# Patient Record
Sex: FEMALE | Race: WHITE
[De-identification: names, ages, dates, MRNs, and addresses within clinical notes are randomized per-mention and may not be internally consistent; named-entity substitution may affect disease eponyms.]

---

## 2018-08-09 ENCOUNTER — HOSPITAL ENCOUNTER (EMERGENCY)
Dept: HOSPITAL 33 - ED | Age: 58
Discharge: ANOTHER HOSPITAL | End: 2018-08-09
Payer: MEDICARE

## 2018-08-09 VITALS — SYSTOLIC BLOOD PRESSURE: 140 MMHG | DIASTOLIC BLOOD PRESSURE: 41 MMHG

## 2018-08-09 VITALS — HEART RATE: 76 BPM

## 2018-08-09 VITALS — OXYGEN SATURATION: 97 %

## 2018-08-09 DIAGNOSIS — R07.9: ICD-10-CM

## 2018-08-09 DIAGNOSIS — Z79.01: ICD-10-CM

## 2018-08-09 DIAGNOSIS — R10.9: Primary | ICD-10-CM

## 2018-08-09 DIAGNOSIS — Z79.899: ICD-10-CM

## 2018-08-09 DIAGNOSIS — I71.4: ICD-10-CM

## 2018-08-09 LAB
ALBUMIN SERPL-MCNC: 4 G/DL (ref 3.5–5)
ALP SERPL-CCNC: 109 U/L (ref 38–126)
ALT SERPL-CCNC: 20 U/L (ref 0–35)
ANION GAP SERPL CALC-SCNC: 16 MEQ/L (ref 5–15)
AST SERPL QL: 23 U/L (ref 14–36)
BILIRUB BLD-MCNC: 0.4 MG/DL (ref 0.2–1.3)
BUN SERPL-MCNC: 27 MG/DL (ref 7–17)
CALCIUM SPEC-MCNC: 9.1 MG/DL (ref 8.4–10.2)
CHLORIDE SERPL-SCNC: 110 MMOL/L (ref 98–107)
CO2 SERPL-SCNC: 22 MMOL/L (ref 22–30)
CREAT SERPL-MCNC: 1.8 MG/DL (ref 0.52–1.04)
GLUCOSE SERPL-MCNC: 100 MG/DL (ref 74–106)
GRANULOCYTES # BLD AUTO: 6.5 10*3/UL (ref 1.4–6.9)
HCT VFR BLD AUTO: 37.3 % (ref 35–47)
HGB BLD-MCNC: 11.9 GM/DL (ref 12–16)
INR PPP: 1.46 (ref 0.8–3)
LIPASE SERPL-CCNC: 177 U/L (ref 23–300)
MCH RBC QN AUTO: 29.6 PG (ref 26–32)
MCHC RBC AUTO-ENTMCNC: 31.9 G/DL (ref 32–36)
PLATELET # BLD AUTO: 121 K/MM3 (ref 150–450)
POTASSIUM SERPLBLD-SCNC: 4.5 MMOL/L (ref 3.5–5.1)
PROT SERPL-MCNC: 7.2 G/DL (ref 6.3–8.2)
RBC # BLD AUTO: 4.01 M/MM3 (ref 4.1–5.4)
SODIUM SERPL-SCNC: 144 MMOL/L (ref 137–145)
WBC # BLD AUTO: 9.4 K/MM3 (ref 4–10.5)

## 2018-08-09 PROCEDURE — 96375 TX/PRO/DX INJ NEW DRUG ADDON: CPT

## 2018-08-09 PROCEDURE — 36000 PLACE NEEDLE IN VEIN: CPT

## 2018-08-09 PROCEDURE — 85610 PROTHROMBIN TIME: CPT

## 2018-08-09 PROCEDURE — 85379 FIBRIN DEGRADATION QUANT: CPT

## 2018-08-09 PROCEDURE — 84484 ASSAY OF TROPONIN QUANT: CPT

## 2018-08-09 PROCEDURE — 80053 COMPREHEN METABOLIC PANEL: CPT

## 2018-08-09 PROCEDURE — 96376 TX/PRO/DX INJ SAME DRUG ADON: CPT

## 2018-08-09 PROCEDURE — 93005 ELECTROCARDIOGRAM TRACING: CPT

## 2018-08-09 PROCEDURE — 96374 THER/PROPH/DIAG INJ IV PUSH: CPT

## 2018-08-09 PROCEDURE — 85025 COMPLETE CBC W/AUTO DIFF WBC: CPT

## 2018-08-09 PROCEDURE — 71250 CT THORAX DX C-: CPT

## 2018-08-09 PROCEDURE — 83605 ASSAY OF LACTIC ACID: CPT

## 2018-08-09 PROCEDURE — 74176 CT ABD & PELVIS W/O CONTRAST: CPT

## 2018-08-09 PROCEDURE — 99285 EMERGENCY DEPT VISIT HI MDM: CPT

## 2018-08-09 PROCEDURE — 83690 ASSAY OF LIPASE: CPT

## 2018-08-09 PROCEDURE — 36415 COLL VENOUS BLD VENIPUNCTURE: CPT

## 2018-08-09 NOTE — XRAY
Indication: Short of breath.  Lower chest/upper abdomen pain.



Multiple contiguous axial images obtained through the chest without contrast

as ordered.



Comparison: None



Mid to lower lungs demonstrates bilateral subsegmental atelectasis/scarring.

Also mild bilateral dependent atelectasis and tiny calcified granuloma in the

right middle and left lower lobes.  No suspicious pulmonary mass, infiltrate,

or effusion.



Heart is enlarged.  Aorta is mildly arteriosclerotic without aneurysmal

dilatation.  A few bilateral hilar and subcarinal calcified nodes.  No

pathologic mediastinal lymphadenopathy.



Bony thorax intact with minimal degenerative changes throughout the spine.

T10 vertebral hemangioma and superior T11 Schmorl node.



CT abdomen reported separately.



Impression:

1.  Cardiomegaly, scattered atelectasis/scarring, and evidence for old

granulomatous disease.

2.  No acute cardiopulmonary abnormalities on this noncontrast exam.

3.  Incidental T10 vertebral hemangioma and T11 Schmorl node.



CT DI 21.10

## 2018-08-09 NOTE — ERPHSYRPT
- History of Present Illness


Time Seen by Provider: 08/09/18 11:53


Historian: patient


Patient Subjective Stated Complaint: sudden onset of upper abd pain 15 min 

prior to arrival.  hx of AAA.  states pain is like cramping.  denies any n/v at 

this time.


Triage Nursing Assessment: patient to room per w/c.  moaning and holding upper 

abd.  restless on cart.  skin w/d, color normal, resp easy.  abd soft, tender 

upper abd.


Physician History: 





The patient is a morbidly obese 58-year-old female with family complaining of 

onset of upper abdominal/chest pain prior to arrival.  She denies nausea, 

vomiting, or diarrhea.  She is having some difficulty breathing but it hurts to 

breath.  She has sa known abdominal aortic aneurysm.  She denies fever or 

chills.  Past medical history significant for lupus anticoagulant, AAA, morbid 

obesity, hypertension, and high cholesterol.


Timing/Duration: today, hour(s) (30 min), sudden, worse


Activities at Onset: sleep


Quality: sharpness, stabbing


Abdominal Pain Onset Location: epigastric


Pain Radiation: chest


Severity of Pain-Max: severe


Severity of Pain-Current: severe


Modifying Factors: Improves With: nothing


Associated Symptoms: shortness of breath, No nausea, No vomiting


Previous symptoms: no prior history


Allergies/Adverse Reactions: 








oxytetracycline [From Terramycin] Allergy (Verified 08/09/18 11:40)


 


oxytetracycline HCl [From Terramycin] Allergy (Verified 08/09/18 11:40)


 


Penicillins Allergy (Verified 08/09/18 11:40)


 





Home Medications: 








Metoprolol Succinate [Toprol Xl] 100 mg PO DAILY 01/21/15 [History]


Pramipexole Di-HCl [Mirapex] 1 mg PO HS 01/21/15 [History]


Quinapril HCl 40 mg PO DAILY 01/21/15 [History]


Rosuvastatin Calcium [Crestor] 20 mg PO HS 01/21/15 [History]


Warfarin Sodium 5 mg*** [Coumadin 5 MG***] 5 mg PO DAILY 01/21/15 [History]


Duloxetine HCl [Cymbalta] 120 mg PO DAILY 08/09/18 [History]


Temazepam 30 mg PO HSPRN PRN 08/09/18 [History]


clonazePAM [Klonopin] 1 mg PO HS 08/09/18 [History]





Hx Tetanus, Diphtheria Vaccination/Date Given: No


Hx Influenza Vaccination/Date Given: Yes


Hx Pneumococcal Vaccination/Date Given: Yes





- Review of Systems


Constitutional: No Fever, No Chills


Eyes: No Symptoms


Ears, Nose, & Throat: No Symptoms


Respiratory: Dyspnea


Cardiac: Chest Pain


Abdominal/Gastrointestinal: Abdominal Pain, No Nausea, No Vomiting, No Diarrhea


Genitourinary Symptoms: No Dysuria


Musculoskeletal: No Back Pain, No Neck Pain


Skin: No Rash


Neurological: No Dizziness, No Focal Weakness, No Sensory Changes


Psychological: No Symptoms


Endocrine: No Symptoms


Hematologic/Lymphatic: No Symptoms


Immunological/Allergic: No Symptoms


All Other Systems: Reviewed and Negative





- Past Medical History


Pertinent Past Medical History: Yes


Neurological History: TIA


ENT History: No Pertinent History


Cardiac History: Hypertension


Respiratory History: No Pertinent History


Endocrine Medical History: Diabetes Type II


Musculoskeletal History: Other


GI Medical History: No Pertinent History


 History: No Pertinent History


Psycho-Social History: Depression


Female Reproductive Disorders: No Pertinent History


Other Medical History: 2 LEAKY VALVES, LUPUS ANTICOAGULOPATHY





- Past Surgical History


Past Surgical History: Yes


Cardiac: Cardiac Catheterization, Cardiac Stent


Musculoskeletal: Orthopedic Surgery


Female Surgical History: Hysterectomy, Dilation & Curettage


Other Surgical History: CARPAL TUNNEL-RIGHT WRIST, RIGHT FOOT





- Social History


Smoking Status: Current every day smoker


How long have you smoked: yrs


Exposure to second hand smoke: No


Drug Use: none


Patient Lives Alone: Yes





- Female History


Hx Pregnant Now: No





- Nursing Vital Signs


Nursing Vital Signs: 


 Initial Vital Signs











Temperature  97.9 F   08/09/18 11:27


 


Pulse Rate  81   08/09/18 11:27


 


Respiratory Rate  18   08/09/18 11:27


 


Blood Pressure  181/88   08/09/18 11:27


 


O2 Sat by Pulse Oximetry  97   08/09/18 11:27








 Pain Scale











Pain Intensity                 8

















- Physical Exam


General Appearance: moderate distress, obese


Eye Exam: PERRL/EOMI, eyes nml inspection


Ears, Nose, Throat Exam: normal ENT inspection, pharynx normal, moist mucous 

membranes


Neck Exam: normal inspection, non-tender, supple, full range of motion


Respiratory Exam: normal breath sounds, lungs clear, No respiratory distress


Cardiovascular Exam: regular rate/rhythm, normal heart sounds


Gastrointestinal/Abdomen Exam: tenderness, No pulsatile mass


Pelvic Exam: not done


Rectal Exam: not done


Back Exam: normal inspection, normal range of motion, No CVA tenderness, No 

vertebral tenderness


Extremity Exam: normal inspection, normal range of motion, pelvis stable


Neurologic Exam: alert, oriented x 3, cooperative, normal mood/affect, nml 

cerebellar function, sensation nml, No motor deficits


Skin Exam: normal color, warm, dry


**SpO2 Interpretation**: normal


SpO2: 97


Oxygen Delivery: Room Air





- Course


EKG Interpreted by Me: RATE, Sinus Rhythm, NORMAL AXIS, NORMAL INTERVALS, 

NORMAL QRS, Other (no change compared to EKG from 1/22/15.)





- CT Exams


  ** Chest


CT Interpretation: Negative, Tele-radiologist Report (per Dr Mendoza), Other (aorta 

mildly arteriosclerotic without aneurysmal dilatation.)





  ** Abdomen/Pelvis


CT Interpretation: Tele-radiologist Report (per Dr Mendoza.), Other (moderate 

aortoiliac calcifications without aneurysmal dilatation. bilateral renal cysts)


Ordered Tests: 


 Active Orders 24 hr











 Category Date Time Status


 


 EKG-ER Only STAT Care  08/09/18 11:49 Active


 


 IV Insertion STAT Care  08/09/18 11:49 Active


 


 ABDOMEN AND PELVIS W/0 CONTRAS [CT] Stat Exams  08/09/18 11:50 Completed


 


 CHEST WITHOUT CONTRAST [CT] Stat Exams  08/09/18 11:52 Completed


 


 CBC W DIFF Stat Lab  08/09/18 12:06 Completed


 


 CMP Stat Lab  08/09/18 12:06 Completed


 


 D-DIMER QUANTITATION Stat Lab  08/09/18 12:06 Completed


 


 LIPASE Stat Lab  08/09/18 12:06 Completed


 


 Lactic Acid Stat Lab  08/09/18 12:04 Completed


 


 Manual Differential NC Stat Lab  08/09/18 12:06 Completed


 


 PT INR [PROTIME WITH INR] Stat Lab  08/09/18 13:56 Completed


 


 TROPONIN Q3H Lab  08/09/18 12:06 Completed


 


 TROPONIN Q3H Lab  08/09/18 15:00 Ordered


 


 TROPONIN Q3H Lab  08/09/18 18:00 Ordered


 


 TROPONIN Q3H Lab  08/09/18 21:00 Ordered


 


 TROPONIN Q3H Lab  08/10/18 00:00 Ordered


 


 UA W/RFX UR CULTURE Stat Lab  08/09/18 11:50 Uncollected








Medication Summary














Discontinued Medications














Generic Name Dose Route Start Last Admin





  Trade Name Freq  PRN Reason Stop Dose Admin


 


Famotidine  20 mg  08/09/18 11:49  08/09/18 11:59





  Pepcid 20 Mg Vial***  IV  08/09/18 11:50  20 mg





  STAT ONE   Administration





     





     





     





     


 


Famotidine  Confirm  08/09/18 11:56  





  Pepcid 20 Mg Vial***  Administered  08/09/18 11:57  





  Dose   





  20 mg   





  IV   





  .STK-MED ONE   





     





     





     





     


 


Morphine Sulfate  4 mg  08/09/18 11:49  08/09/18 11:59





  Morphine Sulfate 4 Mg Inj***  IV  08/09/18 11:50  4 mg





  STAT ONE   Administration





     





     





     





     


 


Morphine Sulfate  Confirm  08/09/18 11:56  





  Morphine Sulfate 4 Mg Inj***  Administered  08/09/18 11:57  





  Dose   





  4 mg   





  .ROUTE   





  .STK-MED ONE   





     





     





     





     


 


Morphine Sulfate  8 mg  08/09/18 13:53  





  Morphine Sulfate 4 Mg Inj***  IV  08/09/18 13:54  





  STAT ONE   





     





     





     





     


 


Ondansetron HCl  4 mg  08/09/18 11:49  08/09/18 12:00





  Zofran 4 Mg/2 Ml Vial**  IV  08/09/18 11:50  4 mg





  STAT ONE   Administration





     





     





     





     


 


Ondansetron HCl  Confirm  08/09/18 11:55  





  Zofran 4 Mg/2 Ml Vial**  Administered  08/09/18 11:56  





  Dose   





  4 mg   





  .ROUTE   





  .STK-MED ONE   





     





     





     





     











Lab/Rad Data: 


 Laboratory Result Diagrams





 08/09/18 12:06 





 08/09/18 12:06 





 Laboratory Results











  08/09/18 08/09/18 08/09/18 Range/Units





  13:56 12:06 12:06 


 


WBC     (4.0-10.5)  K/mm3


 


RBC     (4.1-5.4)  M/mm3


 


Hgb     (12.0-16.0)  gm/dl


 


Hct     (35-47)  %


 


MCV     ()  fl


 


MCH     (26-32)  pg


 


MCHC     (32-36)  g/dl


 


RDW     (11.5-14.0)  %


 


Plt Count     (150-450)  K/mm3


 


MPV     (6-9.5)  fl


 


Absolute Granulocytes     (1.4-6.9)  


 


PT  17.1 H    (9.95-12.35)  SECONDS


 


INR  1.46    (0.8-3.0)  


 


D-Dimer    547 H*  (215-500)  ng/mL


 


Sodium     (137-145)  mmol/L


 


Potassium     (3.5-5.1)  mmol/L


 


Chloride     ()  mmol/L


 


Carbon Dioxide     (22-30)  mmol/L


 


Anion Gap     (5-15)  MEQ/L


 


BUN     (7-17)  mg/dL


 


Creatinine     (0.52-1.04)  mg/dL


 


Estimated GFR     ML/MIN


 


Glucose     ()  mg/dL


 


Lactic Acid     (0.4-2.0)  


 


Calcium     (8.4-10.2)  mg/dL


 


Total Bilirubin     (0.2-1.3)  mg/dL


 


AST     (14-36)  U/L


 


ALT     (0-35)  U/L


 


Alkaline Phosphatase     ()  U/L


 


Troponin I   0.019   (0.000-0.034)  ng/mL


 


Serum Total Protein     (6.3-8.2)  g/dL


 


Albumin     (3.5-5.0)  g/dL


 


Lipase     ()  U/L














  08/09/18 08/09/18 08/09/18 Range/Units





  12:06 12:06 12:04 


 


WBC   9.4   (4.0-10.5)  K/mm3


 


RBC   4.01 L   (4.1-5.4)  M/mm3


 


Hgb   11.9 L   (12.0-16.0)  gm/dl


 


Hct   37.3   (35-47)  %


 


MCV   93.0   ()  fl


 


MCH   29.6   (26-32)  pg


 


MCHC   31.9 L   (32-36)  g/dl


 


RDW   15.9 H   (11.5-14.0)  %


 


Plt Count   121 L   (150-450)  K/mm3


 


MPV   10.9 H   (6-9.5)  fl


 


Absolute Granulocytes   6.50   (1.4-6.9)  


 


PT     (9.95-12.35)  SECONDS


 


INR     (0.8-3.0)  


 


D-Dimer     (215-500)  ng/mL


 


Sodium  144    (137-145)  mmol/L


 


Potassium  4.5    (3.5-5.1)  mmol/L


 


Chloride  110 H    ()  mmol/L


 


Carbon Dioxide  22    (22-30)  mmol/L


 


Anion Gap  16.0 H    (5-15)  MEQ/L


 


BUN  27 H    (7-17)  mg/dL


 


Creatinine  1.80 H    (0.52-1.04)  mg/dL


 


Estimated GFR  30.7    ML/MIN


 


Glucose  100    ()  mg/dL


 


Lactic Acid    1.0  (0.4-2.0)  


 


Calcium  9.1    (8.4-10.2)  mg/dL


 


Total Bilirubin  0.40    (0.2-1.3)  mg/dL


 


AST  23    (14-36)  U/L


 


ALT  20    (0-35)  U/L


 


Alkaline Phosphatase  109    ()  U/L


 


Troponin I     (0.000-0.034)  ng/mL


 


Serum Total Protein  7.2    (6.3-8.2)  g/dL


 


Albumin  4.0    (3.5-5.0)  g/dL


 


Lipase  177    ()  U/L














- Progress


Progress: improved


Progress Note: 





08/09/18 14:32


I discussed pt with Dr Macias and Dr Rubio


Counseled pt/family regarding: lab results, diagnosis, rad results





- Departure


Time of Disposition: 14:30


Departure Disposition: Transfer (transfer to [facility] ER per Dr Mahan and PMD Dr Rubio)


Clinical Impression: 


 Abdominal pain





Condition: Stable


Critical Care Time: No


Referrals: 


LAURA RUBIO [Primary Care Provider] -

## 2018-08-09 NOTE — XRAY
Indication: Short of breath.  Lower chest/upper abdomen pain.



Multiple contiguous axial images obtained through the abdomen and pelvis

without contrast as ordered.



Comparison: None



CT chest reported separately.



Study slightly degraded by respiration artifact.  Noncontrasted stomach and

bowel loops appear nonobstructed.  Appendix not seen.  No free fluid/air.

There are multiple bilateral renal cysts, largest right mid to lower pole

measuring 3.8 cm.  Slightly more dense left mid to lower renal cortical cysts

possibly more complex/viscus.  Previous hysterectomy.



Remaining liver, gallbladder, pancreas, spleen, adrenal glands, kidneys,

ureters, and bladder appear unremarkable for noncontrast exam.  Moderate

aortoiliac calcifications without aneurysmal dilatation.



Osseous structures intact.  Small fatty umbilical hernia.



Impression:

1.  Bilateral renal cysts.  Slightly dense left lower renal cysts possibly

complex/viscus.  Renal sonogram may yield further information if clinically

warranted.

2.  Small fatty umbilical hernia.

3.  Remaining CT abdomen/pelvis without contrast exam is negative.



CT DI 28.11

## 2018-09-21 ENCOUNTER — HOSPITAL ENCOUNTER (EMERGENCY)
Dept: HOSPITAL 33 - ED | Age: 58
LOS: 1 days | Discharge: HOME | End: 2018-09-22
Payer: MEDICARE

## 2018-09-21 DIAGNOSIS — D72.829: Primary | ICD-10-CM

## 2018-09-21 DIAGNOSIS — R53.1: ICD-10-CM

## 2018-09-21 DIAGNOSIS — D68.9: ICD-10-CM

## 2018-09-21 DIAGNOSIS — Z86.73: ICD-10-CM

## 2018-09-21 DIAGNOSIS — Z79.899: ICD-10-CM

## 2018-09-21 DIAGNOSIS — Z79.01: ICD-10-CM

## 2018-09-21 DIAGNOSIS — E87.6: ICD-10-CM

## 2018-09-21 LAB
ALBUMIN SERPL-MCNC: 4.1 G/DL (ref 3.5–5)
ALP SERPL-CCNC: 111 U/L (ref 38–126)
ALT SERPL-CCNC: 17 U/L (ref 0–35)
ANION GAP SERPL CALC-SCNC: 17.8 MEQ/L (ref 5–15)
AST SERPL QL: 16 U/L (ref 14–36)
BASOPHILS # BLD AUTO: 0.02 10*3/UL (ref 0–0.4)
BASOPHILS NFR BLD AUTO: 0.2 % (ref 0–0.4)
BILIRUB BLD-MCNC: 0.5 MG/DL (ref 0.2–1.3)
BNP SERPL-MCNC: 526 PG/ML (ref 0–900)
BUN SERPL-MCNC: 51 MG/DL (ref 7–17)
CALCIUM SPEC-MCNC: 9.2 MG/DL (ref 8.4–10.2)
CHLORIDE SERPL-SCNC: 96 MMOL/L (ref 98–107)
CO2 SERPL-SCNC: 29 MMOL/L (ref 22–30)
CREAT SERPL-MCNC: 3.08 MG/DL (ref 0.52–1.04)
EOSINOPHIL # BLD AUTO: 0.45 10*3/UL (ref 0–0.5)
GLUCOSE SERPL-MCNC: 137 MG/DL (ref 74–106)
GRANULOCYTES # BLD AUTO: 8.68 10*3/UL (ref 1.4–6.9)
HCT VFR BLD AUTO: 35.7 % (ref 35–47)
HGB BLD-MCNC: 11.6 GM/DL (ref 12–16)
INR PPP: 4.54 (ref 0.8–3)
LYMPHOCYTES # SPEC AUTO: 2.92 10*3/UL (ref 1–4.6)
MCH RBC QN AUTO: 30.4 PG (ref 26–32)
MCHC RBC AUTO-ENTMCNC: 32.5 G/DL (ref 32–36)
MONOCYTES # BLD AUTO: 0.83 10*3/UL (ref 0–1.3)
NEUTROPHILS NFR BLD AUTO: 67.3 % (ref 36–66)
PLATELET # BLD AUTO: 193 K/MM3 (ref 150–450)
POTASSIUM SERPLBLD-SCNC: 2.9 MMOL/L (ref 3.5–5.1)
PROT SERPL-MCNC: 7.2 G/DL (ref 6.3–8.2)
RBC # BLD AUTO: 3.81 M/MM3 (ref 4.1–5.4)
SODIUM SERPL-SCNC: 140 MMOL/L (ref 137–145)
WBC # BLD AUTO: 12.9 K/MM3 (ref 4–10.5)

## 2018-09-21 PROCEDURE — 99284 EMERGENCY DEPT VISIT MOD MDM: CPT

## 2018-09-21 PROCEDURE — 80053 COMPREHEN METABOLIC PANEL: CPT

## 2018-09-21 PROCEDURE — 85610 PROTHROMBIN TIME: CPT

## 2018-09-21 PROCEDURE — 36415 COLL VENOUS BLD VENIPUNCTURE: CPT

## 2018-09-21 PROCEDURE — 71045 X-RAY EXAM CHEST 1 VIEW: CPT

## 2018-09-21 PROCEDURE — 82150 ASSAY OF AMYLASE: CPT

## 2018-09-21 PROCEDURE — 85025 COMPLETE CBC W/AUTO DIFF WBC: CPT

## 2018-09-21 PROCEDURE — 83880 ASSAY OF NATRIURETIC PEPTIDE: CPT

## 2018-09-21 PROCEDURE — 36000 PLACE NEEDLE IN VEIN: CPT

## 2018-09-21 PROCEDURE — 96365 THER/PROPH/DIAG IV INF INIT: CPT

## 2018-09-21 NOTE — ERPHSYRPT
- History of Present Illness


Time Seen by Provider: 09/21/18 21:50


Source: patient, family


Exam Limitations: no limitations


Physician History: 





57 y/o white female with h/o stroke with chronic right side weakness and 

paresthesias, presents with pain under right breast for 2 days. sx worsening. 

pain in area with deep inspiration. no cough. no rash. pt still has gallbladder 

in place. 


Timing/Duration: day(s) (2), intermittent, worse


Activities at Onset: none


Severity of Dyspnea-Max: none


Severity of Dyspnea-Current: none


Possible Cause: occasional episodes (in last 2 days)


Modifying Factors: Improves With: deep breath (worsens)


Associated Symptoms: intermittent, chest pain/discomfort (right side), No fever

, No loss of appetite, No wheezing, No hemoptysis, No calf pain, No 

lightheadedness, No leg swelling


Allergies/Adverse Reactions: 








amoxicillin Allergy (Verified 09/21/18 22:04)


 


oxytetracycline [From Terramycin] Allergy (Verified 08/09/18 11:40)


 


oxytetracycline HCl [From Terramycin] Allergy (Verified 08/09/18 11:40)


 


Penicillins Allergy (Verified 08/09/18 11:40)


 





Home Medications: 








Metoprolol Succinate [Toprol Xl] 100 mg PO DAILY 01/21/15 [History]


Pramipexole Di-HCl [Mirapex] 1 mg PO HS 01/21/15 [History]


Quinapril HCl 40 mg PO DAILY 01/21/15 [History]


Rosuvastatin Calcium [Crestor] 20 mg PO HS 01/21/15 [History]


Warfarin Sodium 5 mg*** [Coumadin 5 MG***] 5 mg PO DAILY 01/21/15 [History]


Duloxetine HCl [Cymbalta] 120 mg PO DAILY 08/09/18 [History]


Temazepam 30 mg PO HSPRN PRN 08/09/18 [History]


clonazePAM [Klonopin] 1 mg PO HS 08/09/18 [History]





Hx Tetanus, Diphtheria Vaccination/Date Given: No


Hx Influenza Vaccination/Date Given: Yes


Hx Pneumococcal Vaccination/Date Given: Yes





- Review of Systems


Constitutional: No Symptoms, No Fever


Eyes: No Symptoms, No Discharge, No Eye Pain


Ears, Nose, & Throat: No Symptoms, No Ear Pain, No Ear Discharge


Respiratory: No Symptoms, No Cough, No Dyspnea, No Stridor, No Wheezing


Cardiac: No Symptoms, No Chest Pain, No Palpitations, No Syncope


Abdominal/Gastrointestinal: No Symptoms, No Abdominal Pain, No Nausea, No 

Vomiting, No Diarrhea


Genitourinary Symptoms: No Symptoms, No Dysuria, No Frequency, No Hematuria


Musculoskeletal: No Symptoms, Other (right chest wall pain)


Skin: No Symptoms


Neurological: No Symptoms


Psychological: No Symptoms


Endocrine: No Symptoms


Hematologic/Lymphatic: No Symptoms


Immunological/Allergic: No Symptoms


All Other Systems: Reviewed and Negative





- Past Medical History


Pertinent Past Medical History: Yes


Neurological History: TIA


ENT History: No Pertinent History


Cardiac History: Hypertension


Respiratory History: No Pertinent History


Endocrine Medical History: Diabetes Type II


Musculoskeletal History: Other


GI Medical History: No Pertinent History


 History: No Pertinent History


Psycho-Social History: Depression


Female Reproductive Disorders: No Pertinent History


Other Medical History: 2 LEAKY VALVES, LUPUS ANTICOAGULOPATHY





- Past Surgical History


Past Surgical History: Yes


Cardiac: Cardiac Catheterization, Cardiac Stent


Respiratory: No Pertinent History


Gastrointestinal: No Pertinent History


Genitourinary: No Pertinent History


Musculoskeletal: Orthopedic Surgery


Female Surgical History: Hysterectomy, Dilation & Curettage


Other Surgical History: CARPAL TUNNEL-RIGHT WRIST, RIGHT FOOT





- Social History


Smoking Status: Current every day smoker


How long have you smoked: yrs


Exposure to second hand smoke: No


Drug Use: none


Patient Lives Alone: Yes





- Nursing Vital Signs


Nursing Vital Signs: 


 Initial Vital Signs











Temperature  97.7 F   09/21/18 21:47


 


Pulse Rate  82   09/21/18 21:47


 


Blood Pressure  112/54   09/21/18 21:47


 


O2 Sat by Pulse Oximetry  95   09/21/18 21:47








 Pain Scale











Pain Intensity                 6

















- Physical Exam


General Appearance: no apparent distress, alert, anxiety


Eye Exam: PERRL/EOMI, No eyes nml inspection


Ears, Nose, Throat Exam: hearing grossly normal, normal ENT inspection, normal 

pharynx


Neck Exam: normal inspection, non-tender, supple, full range of motion


Respiratory Exam: normal breath sounds, chest tenderness (right chest wall), 

lungs clear, airway intact, No respiratory distress, No accessory muscle use, 

No rhonchi, No wheezing, No stridor


Cardiovascular/Chest Exam: normal heart sounds, regular rate/rhythm, murmur


Abdominal/Gastrointestinal Exam: soft, normal bowel sounds, tenderness (? right 

upper quad), No guarding, No rebound


Rectal Exam: not done


Extremity Exam: non-tender, normal range of motion, normal inspection


Neurologic Exam: alert, oriented x 3, cooperative, CNs II-XII nml as tested


Skin Exam: normal color, warm, dry


Lymphatic Exam: No adenopathy


Oxygen Delivery: Room Air





- Course


Nursing assessment & vital signs reviewed: Yes


Ordered Tests: 


 Active Orders 24 hr











 Category Date Time Status


 


 Cardiac Monitor STAT Care  09/21/18 22:12 Active


 


 IV Insertion STAT Care  09/21/18 22:08 Active


 


 CHEST 1 VIEW (PORTABLE) Stat Exams  09/21/18 22:12 Taken


 


 AMYLASE Stat Lab  09/21/18 22:27 Completed


 


 CBC W DIFF Stat Lab  09/21/18 22:27 Completed


 


 CMP Stat Lab  09/21/18 22:27 Completed


 


 NT PRO BNP Stat Lab  09/21/18 22:27 Completed


 


 PROTIME WITH INR Stat Lab  09/21/18 22:27 Completed








Medication Summary











Generic Name Dose Route Start Last Admin





  Trade Name Freq  PRN Reason Stop Dose Admin


 


Potassium Chloride  40 meq  09/22/18 23:21  





  Potassium Chl 40 Meq/30 Ml Oral Solution***  PO  09/22/18 23:22  





  STAT ONE   





     





     





     





     











Lab/Rad Data: 


 Laboratory Result Diagrams





 09/21/18 22:27 





 09/21/18 22:27 





 Laboratory Results











  09/21/18 09/21/18 09/21/18 Range/Units





  22:27 22:27 22:27 


 


WBC     (4.0-10.5)  K/mm3


 


RBC     (4.1-5.4)  M/mm3


 


Hgb     (12.0-16.0)  gm/dl


 


Hct     (35-47)  %


 


MCV     ()  fl


 


MCH     (26-32)  pg


 


MCHC     (32-36)  g/dl


 


RDW     (11.5-14.0)  %


 


Plt Count     (150-450)  K/mm3


 


MPV     (6-9.5)  fl


 


Gran %     (36.0-66.0)  %


 


Eos # (Auto)     (0-0.5)  


 


Absolute Lymphs (auto)     (1.0-4.6)  


 


Absolute Monos (auto)     (0.0-1.3)  


 


Lymphocytes %     (24.0-44.0)  %


 


Monocytes %     (0.0-12.0)  %


 


Eosinophils %     (0.00-5.0)  %


 


Basophils %     (0.0-0.4)  %


 


Absolute Granulocytes     (1.4-6.9)  


 


Basophils #     (0-0.4)  


 


PT   53.6 H   (9.95-12.35)  SECONDS


 


INR   4.54 H   (0.8-3.0)  


 


Sodium    140  (137-145)  mmol/L


 


Potassium    2.9 L*  (3.5-5.1)  mmol/L


 


Chloride    96 L  ()  mmol/L


 


Carbon Dioxide    29  (22-30)  mmol/L


 


Anion Gap    17.8 H  (5-15)  MEQ/L


 


BUN    51 H  (7-17)  mg/dL


 


Creatinine    3.08 H  (0.52-1.04)  mg/dL


 


Estimated GFR    16.5  ML/MIN


 


Glucose    137 H  ()  mg/dL


 


Calcium    9.2  (8.4-10.2)  mg/dL


 


Total Bilirubin    0.50  (0.2-1.3)  mg/dL


 


AST    16  (14-36)  U/L


 


ALT    17  (0-35)  U/L


 


Alkaline Phosphatase    111  ()  U/L


 


NT-Pro-B Natriuret Pep    526  (0-900)  pg/mL


 


Serum Total Protein    7.2  (6.3-8.2)  g/dL


 


Albumin    4.1  (3.5-5.0)  g/dL


 


Amylase  98    ()  U/L














  09/21/18 Range/Units





  22:27 


 


WBC  12.9 H  (4.0-10.5)  K/mm3


 


RBC  3.81 L  (4.1-5.4)  M/mm3


 


Hgb  11.6 L  (12.0-16.0)  gm/dl


 


Hct  35.7  (35-47)  %


 


MCV  93.7  ()  fl


 


MCH  30.4  (26-32)  pg


 


MCHC  32.5  (32-36)  g/dl


 


RDW  16.3 H  (11.5-14.0)  %


 


Plt Count  193  (150-450)  K/mm3


 


MPV  10.6 H  (6-9.5)  fl


 


Gran %  67.3 H  (36.0-66.0)  %


 


Eos # (Auto)  0.45  (0-0.5)  


 


Absolute Lymphs (auto)  2.92  (1.0-4.6)  


 


Absolute Monos (auto)  0.83  (0.0-1.3)  


 


Lymphocytes %  22.6 L  (24.0-44.0)  %


 


Monocytes %  6.4  (0.0-12.0)  %


 


Eosinophils %  3.5  (0.00-5.0)  %


 


Basophils %  0.2  (0.0-0.4)  %


 


Absolute Granulocytes  8.68 H  (1.4-6.9)  


 


Basophils #  0.02  (0-0.4)  


 


PT   (9.95-12.35)  SECONDS


 


INR   (0.8-3.0)  


 


Sodium   (137-145)  mmol/L


 


Potassium   (3.5-5.1)  mmol/L


 


Chloride   ()  mmol/L


 


Carbon Dioxide   (22-30)  mmol/L


 


Anion Gap   (5-15)  MEQ/L


 


BUN   (7-17)  mg/dL


 


Creatinine   (0.52-1.04)  mg/dL


 


Estimated GFR   ML/MIN


 


Glucose   ()  mg/dL


 


Calcium   (8.4-10.2)  mg/dL


 


Total Bilirubin   (0.2-1.3)  mg/dL


 


AST   (14-36)  U/L


 


ALT   (0-35)  U/L


 


Alkaline Phosphatase   ()  U/L


 


NT-Pro-B Natriuret Pep   (0-900)  pg/mL


 


Serum Total Protein   (6.3-8.2)  g/dL


 


Albumin   (3.5-5.0)  g/dL


 


Amylase   ()  U/L














- Progress


Progress: re-examined, unchanged


Air Movement: good


Progress Note: 





09/21/18 23:52


pt states she has had keflex in past without any issues.


Counseled pt/family regarding: lab results, diagnosis, need for follow-up, rad 

results





- Departure


Departure Disposition: Home


Clinical Impression: 


 Leukocytosis, Hypokalemia, Elevated INR





Condition: Stable


Critical Care Time: No


Referrals: 


LAURA OSORIO [Primary Care Provider] - 


Additional Instructions: 


drink plenty of fluids. take medications as prescribed. follow up with lab on 

monday 9/24/18 to recheck potassium level. follow up with primary doctor on 

monday 9/24/18 for further management


Prescriptions: 


Cefdinir 300 mg PO BID 7 Days #14 capsule

## 2018-09-22 VITALS — SYSTOLIC BLOOD PRESSURE: 138 MMHG | DIASTOLIC BLOOD PRESSURE: 67 MMHG | HEART RATE: 65 BPM | OXYGEN SATURATION: 96 %

## 2018-09-22 NOTE — XRAY
Indication: Right chest pain.  No known injury.



Comparison: November 11, 2016.



Portable chest less inflated again with minimal bibasilar subsegmental

atelectasis/scarring and a few scattered calcified granulomas.  No focal

infiltrate, consolidation, or large effusion.  Heart is not enlarged.  Bony

thorax intact again with mild osteopenia and degenerative changes.



Impression: Stable nonacute chest with chronic features.

## 2018-11-04 ENCOUNTER — HOSPITAL ENCOUNTER (EMERGENCY)
Dept: HOSPITAL 33 - ED | Age: 58
Discharge: HOME | End: 2018-11-04
Payer: MEDICARE

## 2018-11-04 VITALS — DIASTOLIC BLOOD PRESSURE: 51 MMHG | SYSTOLIC BLOOD PRESSURE: 138 MMHG | OXYGEN SATURATION: 95 %

## 2018-11-04 VITALS — HEART RATE: 56 BPM

## 2018-11-04 DIAGNOSIS — Z79.01: ICD-10-CM

## 2018-11-04 DIAGNOSIS — I38: ICD-10-CM

## 2018-11-04 DIAGNOSIS — I25.10: ICD-10-CM

## 2018-11-04 DIAGNOSIS — I10: ICD-10-CM

## 2018-11-04 DIAGNOSIS — Z86.73: ICD-10-CM

## 2018-11-04 DIAGNOSIS — E11.9: ICD-10-CM

## 2018-11-04 DIAGNOSIS — M10.9: ICD-10-CM

## 2018-11-04 DIAGNOSIS — M79.674: Primary | ICD-10-CM

## 2018-11-04 DIAGNOSIS — F32.9: ICD-10-CM

## 2018-11-04 DIAGNOSIS — Z79.899: ICD-10-CM

## 2018-11-04 LAB
ANION GAP SERPL CALC-SCNC: 11.9 MEQ/L (ref 5–15)
BASOPHILS # BLD AUTO: 0.03 10*3/UL (ref 0–0.4)
BASOPHILS NFR BLD AUTO: 0.2 % (ref 0–0.4)
BUN SERPL-MCNC: 50 MG/DL (ref 7–17)
CALCIUM SPEC-MCNC: 9.5 MG/DL (ref 8.4–10.2)
CHLORIDE SERPL-SCNC: 102 MMOL/L (ref 98–107)
CO2 SERPL-SCNC: 32 MMOL/L (ref 22–30)
CREAT SERPL-MCNC: 1.84 MG/DL (ref 0.52–1.04)
EOSINOPHIL # BLD AUTO: 0.35 10*3/UL (ref 0–0.5)
GLUCOSE SERPL-MCNC: 105 MG/DL (ref 74–106)
GRANULOCYTES # BLD AUTO: 8.82 10*3/UL (ref 1.4–6.9)
HCT VFR BLD AUTO: 36.1 % (ref 35–47)
HGB BLD-MCNC: 11.1 GM/DL (ref 12–16)
LYMPHOCYTES # SPEC AUTO: 2.56 10*3/UL (ref 1–4.6)
MCH RBC QN AUTO: 29.3 PG (ref 26–32)
MCHC RBC AUTO-ENTMCNC: 30.7 G/DL (ref 32–36)
MONOCYTES # BLD AUTO: 0.73 10*3/UL (ref 0–1.3)
NEUTROPHILS NFR BLD AUTO: 70.7 % (ref 36–66)
PLATELET # BLD AUTO: 225 K/MM3 (ref 150–450)
POTASSIUM SERPLBLD-SCNC: 3.9 MMOL/L (ref 3.5–5.1)
RBC # BLD AUTO: 3.78 M/MM3 (ref 4.1–5.4)
SODIUM SERPL-SCNC: 141 MMOL/L (ref 137–145)
WBC # BLD AUTO: 12.5 K/MM3 (ref 4–10.5)

## 2018-11-04 PROCEDURE — 96372 THER/PROPH/DIAG INJ SC/IM: CPT

## 2018-11-04 PROCEDURE — 85025 COMPLETE CBC W/AUTO DIFF WBC: CPT

## 2018-11-04 PROCEDURE — 84550 ASSAY OF BLOOD/URIC ACID: CPT

## 2018-11-04 PROCEDURE — 80048 BASIC METABOLIC PNL TOTAL CA: CPT

## 2018-11-04 PROCEDURE — 99284 EMERGENCY DEPT VISIT MOD MDM: CPT

## 2018-11-04 PROCEDURE — 73630 X-RAY EXAM OF FOOT: CPT

## 2018-11-04 PROCEDURE — 36415 COLL VENOUS BLD VENIPUNCTURE: CPT

## 2018-11-04 NOTE — ERPHSYRPT
- History of Present Illness


Time Seen by Provider: 11/04/18 19:36


Source: patient


Exam Limitations: no limitations


Patient Subjective Stated Complaint: right great toe red and swollen, rates 

pain 8/10


Triage Nursing Assessment: Pt c/o of right great toe pain, toe is red and 

swollen, denies any injury, pulses normal, vitals wnl, rates pain 8/10, denies 

any other issues at this time


Physician History: 





58-year-old white female with history of diabetes, atherosclerotic coronary 

artery disease, high blood pressure, lupus.


Patient arrives with complaint of pain in her right great toe symptoms since 

today denies injury.  Patient does state that she's had fungal infection in her 

toe before.





Past medical history includes TIA, diabetes, high blood pressure, depression, 

to leaky heart valves, lupus antibody, atherosclerotic coronary artery disease.





Past surgical history includes cardiac catheter, cardiac stent, hysterectomy, D&

C, orthopedic surgery, carpal tunnel release, right right wrist surgery








Method of Injury: unknown


Occurred: this morning


Quality: constant


Lower Extremities Pain: 1st toe: right


Modifying Factors: Improves With: nothing


Associated Symptoms: none


Allergies/Adverse Reactions: 








amoxicillin Allergy (Verified 11/04/18 18:51)


 


oxytetracycline [From Terramycin] Allergy (Verified 11/04/18 18:51)


 


oxytetracycline HCl [From Terramycin] Allergy (Verified 11/04/18 18:51)


 


Penicillins Allergy (Verified 11/04/18 18:51)


 





Home Medications: 








Metoprolol Succinate [Toprol Xl] 100 mg PO DAILY 01/21/15 [History]


Pramipexole Di-HCl [Mirapex] 1 mg PO HS 01/21/15 [History]


Quinapril HCl 40 mg PO DAILY 01/21/15 [History]


Rosuvastatin Calcium [Crestor] 20 mg PO HS 01/21/15 [History]


Warfarin Sodium 5 mg*** [Coumadin 5 MG***] 5 mg PO DAILY 01/21/15 [History]


Duloxetine HCl [Cymbalta] 120 mg PO DAILY 08/09/18 [History]


Temazepam 30 mg PO HSPRN PRN 08/09/18 [History]


clonazePAM [Klonopin] 1 mg PO HS 08/09/18 [History]


Duloxetine HCl [Cymbalta] 120 mg PO DAILY 11/04/18 [History]





Hx Tetanus, Diphtheria Vaccination/Date Given: No


Hx Influenza Vaccination/Date Given: Yes


Hx Pneumococcal Vaccination/Date Given: Yes





- Review of Systems


Constitutional: No Fever, No Chills


Eyes: No Symptoms


Ears, Nose, & Throat: No Symptoms


Respiratory: No Cough, No Dyspnea


Cardiac: No Chest Pain, No Edema, No Syncope


Abdominal/Gastrointestinal: No Abdominal Pain, No Nausea, No Vomiting, No 

Diarrhea


Genitourinary Symptoms: No Dysuria


Musculoskeletal: Other (pain and erythema right great toe)


Skin: Other ( right great toe slight erythema dorsally), No Rash


Neurological: No Dizziness, No Focal Weakness, No Sensory Changes


Psychological: No Symptoms


Endocrine: No Symptoms


All Other Systems: Reviewed and Negative





- Past Medical History


Pertinent Past Medical History: Yes


Neurological History: TIA


ENT History: No Pertinent History


Cardiac History: Hypertension


Respiratory History: No Pertinent History


Endocrine Medical History: Diabetes Type II


Musculoskeletal History: Other


GI Medical History: No Pertinent History


 History: No Pertinent History


Psycho-Social History: Depression


Female Reproductive Disorders: No Pertinent History


Other Medical History: 2 LEAKY VALVES, LUPUS ANTICOAGULOPATHY





- Past Surgical History


Past Surgical History: Yes


Cardiac: Cardiac Catheterization, Cardiac Stent


Respiratory: No Pertinent History


Gastrointestinal: No Pertinent History


Genitourinary: No Pertinent History


Musculoskeletal: Orthopedic Surgery


Female Surgical History: Hysterectomy, Dilation & Curettage


Other Surgical History: CARPAL TUNNEL-RIGHT WRIST, RIGHT FOOT





- Social History


Smoking Status: Former smoker


How long have you smoked: yrs


Exposure to second hand smoke: Yes


Drug Use: none


Patient Lives Alone: Yes





- Female History


Hx Pregnant Now: No





- Nursing Vital Signs


Nursing Vital Signs: 


 Initial Vital Signs











Temperature  98.3 F   11/04/18 18:41


 


Pulse Rate  66   11/04/18 18:41


 


Blood Pressure  138/51   11/04/18 18:41


 


O2 Sat by Pulse Oximetry  95   11/04/18 18:41








 Pain Scale











Pain Intensity                 6

















- Physical Exam


General Appearance: mild distress


Eyes, Ears, Nose, Throat Exam: moist mucous membranes


Neck Exam: non-tender, supple


Cardiovascular/Respiratory Exam: chest non-tender, normal breath sounds, 

regular rate/rhythm, no respiratory distress


Gastrointestinal/Abdominal Exam: non-tender, guarding


Hips Exam: bilateral: non-tender, normal inspection, normal range of motion, no 

evidence of injury


Legs Exam: bilateral leg: non-tender, normal inspection, normal range of motion

, no evidence of injury


Knees Exam: bilateral knee: non-tender, normal inspection, normal range of 

motion, no evidence of injury


Ankle Exam: bilateral ankle: non-tender, normal inspection, normal range of 

motion, no evidence of injury


Foot Exam: right foot: other (right great toe tender with palpation, decreased 

range of motion right great secondary to pain, mild erythema right dorsal great 

toe.), left foot: non-tender, normal inspection, normal range of motion, no 

evidence of injury


Neuro/Tendon Exam: normal sensation, normal motor functions


Mental Status Exam: alert, oriented x 3, cooperative


Skin Exam: warm, dry, other (mild erythema right dorsal great toe)


**SpO2 Interpretation**: normal (95%)


SpO2: 95


Oxygen Delivery: Room Air





- Course


Nursing assessment & vital signs reviewed: Yes





- Radiology Exams


  ** Right Foot


X-ray Interpretation: Interpreted by me, Negative, No Fracture, No Subluxation, 

Other (x ray right foot: no fracture, no0 subluxation, hardware in place)


Ordered Tests: 


 Active Orders 24 hr











 Category Date Time Status


 


 Splint STAT Care  11/04/18 20:55 Active


 


 FOOT (MINIMUM 3 VIEWS) Stat Exams  11/04/18 19:39 Taken


 


 BMP Stat Lab  11/04/18 19:59 Completed


 


 CBC W DIFF Stat Lab  11/04/18 19:59 Completed


 


 Uric Acid Stat Lab  11/04/18 19:59 Completed








Medication Summary














Discontinued Medications














Generic Name Dose Route Start Last Admin





  Trade Name Freq  PRN Reason Stop Dose Admin


 


Hydrocodone Bitart/Acetaminophen  1 tab  11/04/18 19:39  11/04/18 19:50





  Norco 5/325 Mg***  PO  11/04/18 19:40  1 tab





  STAT ONE   Administration





     





     





     





     


 


Hydrocodone Bitart/Acetaminophen  Confirm  11/04/18 19:49  





  Norco 5/325 Mg***  Administered  11/04/18 19:50  





  Dose   





  1 tab   





  .ROUTE   





  .STK-MED ONE   





     





     





     





     


 


Hydrocodone Bitart/Acetaminophen  1 tab  11/04/18 20:40  11/04/18 20:45





  Norco 5/325 Mg***  PO  11/04/18 20:41  1 tab





  STAT ONE   Administration





     





     





     





     


 


Hydrocodone Bitart/Acetaminophen  Confirm  11/04/18 20:41  





  Norco 5/325 Mg***  Administered  11/04/18 20:42  





  Dose   





  1 tab   





  .ROUTE   





  .STK-MED ONE   





     





     





     





     


 


Ceftriaxone Sodium  1,000 mg  11/04/18 20:39  





  Rocephin 1000 Mg Inj**  IM  11/04/18 20:40  





  STAT ONE   





     





     





     





     


 


Ceftriaxone Sodium  Confirm  11/04/18 20:41  





  Rocephin 1000 Mg Inj**  Administered  11/04/18 20:42  





  Dose   





  1,000 mg   





  .ROUTE   





  .STK-MED ONE   





     





     





     





     


 


Prednisone  40 mg  11/04/18 20:55  





  Deltasone 20 Mg***  PO  11/04/18 20:56  





  STAT ONE   





     





     





     





     











Lab/Rad Data: 


 Laboratory Result Diagrams





 11/04/18 19:59 





 11/04/18 19:59 





 Laboratory Results











  11/04/18 11/04/18 11/04/18 Range/Units





  19:59 19:59 19:59 


 


WBC    12.5 H  (4.0-10.5)  K/mm3


 


RBC    3.78 L  (4.1-5.4)  M/mm3


 


Hgb    11.1 L  (12.0-16.0)  gm/dl


 


Hct    36.1  (35-47)  %


 


MCV    95.5  ()  fl


 


MCH    29.3  (26-32)  pg


 


MCHC    30.7 L  (32-36)  g/dl


 


RDW    15.5 H  (11.5-14.0)  %


 


Plt Count    225  (150-450)  K/mm3


 


MPV    10.6 H  (6-9.5)  fl


 


Gran %    70.7 H  (36.0-66.0)  %


 


Eos # (Auto)    0.35  (0-0.5)  


 


Absolute Lymphs (auto)    2.56  (1.0-4.6)  


 


Absolute Monos (auto)    0.73  (0.0-1.3)  


 


Lymphocytes %    20.5 L  (24.0-44.0)  %


 


Monocytes %    5.8  (0.0-12.0)  %


 


Eosinophils %    2.8  (0.00-5.0)  %


 


Basophils %    0.2  (0.0-0.4)  %


 


Absolute Granulocytes    8.82 H  (1.4-6.9)  


 


Basophils #    0.03  (0-0.4)  


 


Sodium   141   (137-145)  mmol/L


 


Potassium   3.9   (3.5-5.1)  mmol/L


 


Chloride   102   ()  mmol/L


 


Carbon Dioxide   32 H   (22-30)  mmol/L


 


Anion Gap   11.9   (5-15)  MEQ/L


 


BUN   50 H   (7-17)  mg/dL


 


Creatinine   1.84 H   (0.52-1.04)  mg/dL


 


Estimated GFR   29.9   ML/MIN


 


Glucose   105   ()  mg/dL


 


Uric Acid  12.0 H    (2.6-6.0)  mg/dL


 


Calcium   9.5   (8.4-10.2)  mg/dL














- Progress


Progress: improved


Progress Note: 





11/04/18 20:57


58-year-old white female arrives with complaint of pain right great toe 

erythema dorsal right great toe.


No injury symptoms since today.


Patient with elevated uric acid level of 12.0 patient's white count 12.5 

hemoglobin 11.1 hematocrit 36.1 platelets 225





X-ray right foot no fractures no subluxation (my reading).





Patient with history of diabetes.  Patient felt to be high risk for infection 

despite the elevated uric acid level.


Therefore patient given Rocephin 1 g IM will write for Keflex 500 mg every 6 

hours for 7 days.


Also will place patient on prednisone tapering dose.


And Norco.


Will place postop shoe.


Patient advised follow-up with her family doctor.





Unable to give patient colchicine or nonsteroidals secondary to interaction 

with her medications.





Patient has stated allergy to amoxicillin and penicillin however she states she 

is able to take Keflex.


Has had Rocephin in the past.








- Departure


Time of Disposition: 21:00


Departure Disposition: Home


Clinical Impression: 


 Pain of right great toe, Acute gouty arthritis





Condition: Fair


Critical Care Time: No


Referrals: 


LAURA OSORIO [Primary Care Provider] - 


Additional Instructions: 


Return home.


Keflex 500 mg orally every 6 hours for 7 days.


Elevate right foot 24-48 hours cold packs to area.


Prednisone, Norco as prescribed.


Follow-up with your family doctor.


Return for acute distress or for severe symptoms.


Monitor your blood sugars carefully.





Prescriptions: 


Cephalexin Mh 500 mg** [Keflex 500 mg**] 500 mg PO Q6H #28 capsule


Hydrocodone/Acetaminophen [Norco 5-325 Tablet] 1 tab PO Q4-6HPRN PRN #12 tablet 

MDD 6 tableta


 PRN Reason: Pain

## 2018-11-05 NOTE — XRAY
Indication: Great toe pain, erythema, and swelling.  No known injury.



Comparison: None



3 nonweightbearing views of the right foot demonstrates old 5th metatarsal

fracture with intact orthopedic screw.  No other bony, articular, or soft

tissue abnormalities.

## 2019-04-12 ENCOUNTER — HOSPITAL ENCOUNTER (EMERGENCY)
Dept: HOSPITAL 33 - ED | Age: 59
Discharge: HOME | End: 2019-04-12
Payer: MEDICARE

## 2019-04-12 VITALS — SYSTOLIC BLOOD PRESSURE: 143 MMHG | HEART RATE: 76 BPM | DIASTOLIC BLOOD PRESSURE: 76 MMHG

## 2019-04-12 VITALS — OXYGEN SATURATION: 97 %

## 2019-04-12 DIAGNOSIS — E11.9: ICD-10-CM

## 2019-04-12 DIAGNOSIS — M19.90: ICD-10-CM

## 2019-04-12 DIAGNOSIS — I38: ICD-10-CM

## 2019-04-12 DIAGNOSIS — F32.9: ICD-10-CM

## 2019-04-12 DIAGNOSIS — E66.01: ICD-10-CM

## 2019-04-12 DIAGNOSIS — F41.9: ICD-10-CM

## 2019-04-12 DIAGNOSIS — M79.641: ICD-10-CM

## 2019-04-12 DIAGNOSIS — G47.30: ICD-10-CM

## 2019-04-12 DIAGNOSIS — S60.562A: Primary | ICD-10-CM

## 2019-04-12 DIAGNOSIS — I12.9: ICD-10-CM

## 2019-04-12 DIAGNOSIS — Z86.73: ICD-10-CM

## 2019-04-12 DIAGNOSIS — M10.9: ICD-10-CM
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
## 2019-04-12 DIAGNOSIS — Z79.899: ICD-10-CM

## 2019-04-12 DIAGNOSIS — Z79.01: ICD-10-CM

## 2019-04-12 DIAGNOSIS — M81.0: ICD-10-CM

## 2019-04-12 DIAGNOSIS — I50.9: ICD-10-CM

## 2019-04-12 DIAGNOSIS — I25.10: ICD-10-CM

## 2019-04-12 DIAGNOSIS — I25.2: ICD-10-CM

## 2019-04-12 DIAGNOSIS — N18.4: ICD-10-CM

## 2019-04-12 PROCEDURE — 99283 EMERGENCY DEPT VISIT LOW MDM: CPT

## 2019-04-12 PROCEDURE — 90715 TDAP VACCINE 7 YRS/> IM: CPT

## 2019-04-12 PROCEDURE — 90471 IMMUNIZATION ADMIN: CPT

## 2019-04-12 RX ADMIN — CLOSTRIDIUM TETANI TOXOID ANTIGEN (FORMALDEHYDE INACTIVATED), CORYNEBACTERIUM DIPHTHERIAE TOXOID ANTIGEN (FORMALDEHYDE INACTIVATED), BORDETELLA PERTUSSIS TOXOID ANTIGEN (GLUTARALDEHYDE INACTIVATED), BORDETELLA PERTUSSIS FILAMENTOUS HEMAGGLUTININ ANTIGEN (FORMALDEHYDE INACTIVATED), BORDETELLA PERTUSSIS PERTACTIN ANTIGEN, AND BORDETELLA PERTUSSIS FIMBRIAE 2/3 ANTIGEN ONE ML: 5; 2; 2.5; 5; 3; 5 INJECTION, SUSPENSION INTRAMUSCULAR at 18:39

## 2019-04-12 RX ADMIN — CEPHALEXIN ONE MG: 500 CAPSULE ORAL at 18:31

## 2019-04-12 NOTE — ERPHSYRPT
- History of Present Illness


Time Seen by Provider: 04/12/19 18:27


Source: patient


Exam Limitations: no limitations


Physician History: 





59-year-old white female arrives with complaint of hand pain patient states she 

had an insect bite overlying her right second metacarpal phalangeal joint 

dorsally she has been having erythema pain and itching to the area.





She has no fevers no nausea no vomiting.





Past medical history includes TIA, gout, high blood pressure, diabetes type 2, 

depression, leaky valves, lupus anticoagulant antibiotics.





Past surgical history includes cardiac catheter, cardiac stent, orthopedic 

surgery, hysterectomy, D&C, carpal tunnel right wrist, right foot surgery





Social history former smoker denies tobacco alcohol or illicit drug use


Occurred: days ago (2 days ago)


Method of Injury: other (insect bite)


Severity of Pain-Max: moderate


Severity of Pain-Current: mild


Extremities Pain Location: hand: left (erythema and pain overlying the second 

dorsal MCP joint)


Modifying Factors: Improves With: nothing


Associated Symptoms: none, No back pain, No chills, No chest discomfort, No 

chest pain, No dyspnea, No jaw pain, No nausea, No neck pain, No short of breath

, No vomiting


Allergies/Adverse Reactions: 








amoxicillin Allergy (Verified 11/04/18 18:51)


 


oxytetracycline [From Terramycin] Allergy (Verified 11/04/18 18:51)


 


oxytetracycline HCl [From Terramycin] Allergy (Verified 11/04/18 18:51)


 


Penicillins Allergy (Verified 11/04/18 18:51)


 





Home Medications: 








Metoprolol Succinate [Toprol Xl] 100 mg PO DAILY 01/21/15 [History]


Pramipexole Di-HCl [Mirapex] 1 mg PO HS 01/21/15 [History]


Quinapril HCl 40 mg PO DAILY 01/21/15 [History]


Rosuvastatin Calcium [Crestor] 20 mg PO HS 01/21/15 [History]


Warfarin Sodium 5 mg*** [Coumadin 5 MG***] 5 mg PO DAILY 01/21/15 [History]


Duloxetine HCl [Cymbalta] 120 mg PO DAILY 08/09/18 [History]


Temazepam 30 mg PO HSPRN PRN 08/09/18 [History]


clonazePAM [Klonopin] 1 mg PO HS 08/09/18 [History]


Duloxetine HCl [Cymbalta] 120 mg PO DAILY 11/04/18 [History]





Hx Tetanus, Diphtheria Vaccination/Date Given: No


Hx Influenza Vaccination/Date Given: Yes


Hx Pneumococcal Vaccination/Date Given: Yes





- Review of Systems


Constitutional: No Fever, No Chills


Eyes: No Symptoms


Ears, Nose, & Throat: No Symptoms


Respiratory: No Cough, No Dyspnea


Cardiac: No Chest Pain, No Edema, No Syncope


Abdominal/Gastrointestinal: No Abdominal Pain, No Nausea, No Vomiting, No 

Diarrhea


Genitourinary Symptoms: No Dysuria


Musculoskeletal: Joint Redness, Joint Pain, Other (erythema and pain overlying 

dorsal left second MCP joint insect bite to the area), No Arthralgias, No Back 

Pain, No Neck Pain, No Deformity, No Fall, No Injury, No Joint Swelling, No 

Myalgias


Skin: Other (insect bite left hand overlying metacarpal phalangeal joint 

dorsally), No Decubiti, No Induration, No Pruritis, No Rash, No Skin Lesions, 

No Dryness


Neurological: No Dizziness, No Focal Weakness, No Sensory Changes


Psychological: No Symptoms


Endocrine: No Symptoms


All Other Systems: Reviewed and Negative





- Past Medical History


Pertinent Past Medical History: Yes


Neurological History: TIA


ENT History: No Pertinent History


Cardiac History: Coronary Artery Disease, Hypertension, Myocardial Infarction (

MI)


Respiratory History: CHF, Sleep Apnea


Endocrine Medical History: Other


Musculoskeletal History: Arthritis


GI Medical History: No Pertinent History


 History: No Pertinent History


Psycho-Social History: Depression


Female Reproductive Disorders: No Pertinent History


Other Medical History: LUPUS ANTICOAGULANT DISORDER, MORBID OBESITY, GOUT, 

ANXIETY, DEPRESSION, VENTRICULAR ARRHYTHMIA, CKD STAGE 4, OSTEOPOROSIS, 

HYPERGLYCEMIA, FRACTURE PATELLA 2016





- Past Surgical History


Past Surgical History: Yes


Cardiac: Cardiac Catheterization, Cardiac Stent


Respiratory: No Pertinent History


Gastrointestinal: No Pertinent History


Genitourinary: No Pertinent History


Musculoskeletal: Orthopedic Surgery


Female Surgical History: Hysterectomy, Dilation & Curettage


Other Surgical History: CARPAL TUNNEL-RIGHT WRIST, RIGHT FOOT





- Social History


Smoking Status: Former smoker


How long have you smoked: yrs


Exposure to second hand smoke: Yes


Drug Use: none


Patient Lives Alone: Yes





- Physical Exam


General Appearance: alert


Eyes, Ears, Nose, Throat Exam: moist mucous membranes


Neck Exam: non-tender, supple


Cardiovascular/Respiratory Exam: chest non-tender, normal breath sounds, 

regular rate/rhythm, no respiratory distress


Abdominal Exam: non-tender, soft, no organomegaly, no hernia, No guarding, No 

tenderness


Back Exam: normal inspection, No vertebral tenderness


Shoulder Exam: normal inspection, non-tender, no evidence of injury, normal ROM

, No bone tenderness


Elbow/Forearm Exam: normal inspection, non-tender, no evidence of injury, 

normal ROM, No bone tenderness


Wrist Exam: normal inspection, non-tender, no evidence of injury, normal ROM, 

No bone tenderness


Hand Exam: normal ROM, soft tissue tenderness, No normal inspection (left hand 

mild erythema and slight edema overlying left dorsal second MCP joint), No non-

tender, No abrasions, No asymmetry, No bone tenderness, No deformity, No 

ecchymosis, No limited ROM


DTR - Upper Extremity Exam: tricep (R): 2+, tricep (L): 2+


Neuro/Tendon Exam: normal sensation, normal motor functions


Mental Status Exam: alert, oriented x 3, cooperative


Skin Exam: other (mild erythema and slight edema overlying left second MCP 

joint dorsally)


**SpO2 Interpretation**: normal (97%)





- Course


Nursing assessment & vital signs reviewed: Yes


Ordered Tests: 





Medication Summary











Generic Name Dose Route Start Last Admin





  Trade Name Freq  PRN Reason Stop Dose Admin


 


Cephalexin HCl  500 mg  04/12/19 18:26  





  Keflex 500 Mg**  PO  04/12/19 18:27  





  STAT ONE   





     





     





     





     














- Progress


Progress: improved


Progress Note: 





04/12/19 18:32


59-year-old white female with history of gout, TIA, high blood pressure, 

diabetes type 2, depression, leaky heart valves, lupus anticoagulant.





Patient states she had an insect bite to her dorsal left hand overlying the MCP 

joint #2.


She states the area is somewhat tender she has mild erythema to the area slight 

edema.





Patient states she took some Benadryl yesterday patient has a prescription 

which she filled on April 9, 2019 for hydrocodone No. 21 tablets are 5/325 

these are prescribed by her nurse practitioner.








Will go ahead and place patient on Keflex she has a listed allergy for 

penicillin however she is taking Keflex without problems in the past.


Patient will be advised to put cold packs to the area take Benadryl 50 mg every 

6 hours as needed.


Follow-up with her family doctor if symptoms are worse, no better in 24-48 

hours or persist longer than 72 hours.











- Departure


Departure Disposition: Home


Clinical Impression: 


Insect bite of left hand


Qualifiers:


 Encounter type: initial encounter Qualified Code(s): S60.562A - Insect bite (

nonvenomous) of left hand, initial encounter; W57.XXXA - Bitten or stung by 

nonvenomous insect and other nonvenomous arthropods, initial encounter





Condition: Fair


Critical Care Time: No


Referrals: 


JUAN A HAGEN [Primary Care Provider] - 


Additional Instructions: 


Return home.


Cold packs to area 24-48 hours.


Benadryl 50 mg orally every 6 hours as needed.


Keflex 500 mg orally every 6 hours 7 days.


Follow-up with your family doctor if symptoms worse, no better in 24-48 hours 

or persist longer than 72 hours.


Return for acute distress or for severe symptoms.





Prescriptions: 


Cephalexin Mh 500 mg** [Keflex 500 mg**] 500 mg PO Q6H #28

## 2019-11-01 ENCOUNTER — HOSPITAL ENCOUNTER (OUTPATIENT)
Dept: HOSPITAL 33 - MED SURG | Age: 59
Setting detail: OBSERVATION
LOS: 2 days | Discharge: HOME | End: 2019-11-03
Attending: GENERAL PRACTICE | Admitting: FAMILY MEDICINE
Payer: MEDICARE

## 2019-11-01 DIAGNOSIS — E11.9: ICD-10-CM

## 2019-11-01 DIAGNOSIS — R41.82: ICD-10-CM

## 2019-11-01 DIAGNOSIS — Z79.01: ICD-10-CM

## 2019-11-01 DIAGNOSIS — Z86.73: ICD-10-CM

## 2019-11-01 DIAGNOSIS — R42: ICD-10-CM

## 2019-11-01 DIAGNOSIS — R53.83: ICD-10-CM

## 2019-11-01 DIAGNOSIS — R51: Primary | ICD-10-CM

## 2019-11-01 DIAGNOSIS — I10: ICD-10-CM

## 2019-11-01 DIAGNOSIS — Z79.899: ICD-10-CM

## 2019-11-01 LAB
AMPHETAMINES UR QL: NEGATIVE
BARBITURATES UR QL: NEGATIVE
BENZODIAZ UR QL SCN: NEGATIVE
COCAINE UR QL SCN: NEGATIVE
FLUAV AG NPH QL IA: NEGATIVE
FLUBV AG NPH QL IA: NEGATIVE
GLUCOSE UR-MCNC: NEGATIVE MG/DL
INR PPP: 1.77 (ref 0.8–3)
METHADONE UR QL: NEGATIVE
OPIATES UR QL: NEGATIVE
PCP UR QL CFM>20 NG/ML: NEGATIVE
PROT UR STRIP-MCNC: 30 MG/DL
PROTHROMBIN TIME: 20.2 SECONDS (ref 9.95–12.35)
RBC #/AREA URNS HPF: (no result) /HPF (ref 0–2)
RSV AG SPEC QL IA: NEGATIVE
THC UR QL SCN: POSITIVE
WBC #/AREA URNS HPF: (no result) /HPF (ref 0–5)

## 2019-11-01 PROCEDURE — 70450 CT HEAD/BRAIN W/O DYE: CPT

## 2019-11-01 PROCEDURE — 81001 URINALYSIS AUTO W/SCOPE: CPT

## 2019-11-01 PROCEDURE — 71046 X-RAY EXAM CHEST 2 VIEWS: CPT

## 2019-11-01 PROCEDURE — 87631 RESP VIRUS 3-5 TARGETS: CPT

## 2019-11-01 PROCEDURE — 80307 DRUG TEST PRSMV CHEM ANLYZR: CPT

## 2019-11-01 PROCEDURE — G0378 HOSPITAL OBSERVATION PER HR: HCPCS

## 2019-11-01 PROCEDURE — 85610 PROTHROMBIN TIME: CPT

## 2019-11-01 PROCEDURE — 80053 COMPREHEN METABOLIC PANEL: CPT

## 2019-11-01 PROCEDURE — 36415 COLL VENOUS BLD VENIPUNCTURE: CPT

## 2019-11-01 PROCEDURE — 85027 COMPLETE CBC AUTOMATED: CPT

## 2019-11-01 RX ADMIN — GABAPENTIN SCH MG: 400 CAPSULE ORAL at 20:54

## 2019-11-01 RX ADMIN — SIMVASTATIN SCH MG: 20 TABLET, FILM COATED ORAL at 20:54

## 2019-11-01 RX ADMIN — CARVEDILOL SCH MG: 3.12 TABLET, FILM COATED ORAL at 20:59

## 2019-11-01 RX ADMIN — WARFARIN SODIUM SCH MG: 5 TABLET ORAL at 17:11

## 2019-11-01 RX ADMIN — TRAMADOL HYDROCHLORIDE PRN MG: 50 TABLET, FILM COATED ORAL at 20:54

## 2019-11-01 RX ADMIN — GABAPENTIN SCH MG: 400 CAPSULE ORAL at 17:11

## 2019-11-01 RX ADMIN — TRAMADOL HYDROCHLORIDE PRN MG: 50 TABLET, FILM COATED ORAL at 13:06

## 2019-11-01 RX ADMIN — PRAMIPEXOLE DIHYDROCHLORIDE SCH MG: 0.5 TABLET ORAL at 18:39

## 2019-11-02 RX ADMIN — ALLOPURINOL SCH MG: 300 TABLET ORAL at 09:53

## 2019-11-02 RX ADMIN — PRAMIPEXOLE DIHYDROCHLORIDE SCH MG: 0.5 TABLET ORAL at 09:55

## 2019-11-02 RX ADMIN — CARVEDILOL SCH MG: 3.12 TABLET, FILM COATED ORAL at 23:31

## 2019-11-02 RX ADMIN — PRAMIPEXOLE DIHYDROCHLORIDE SCH MG: 0.5 TABLET ORAL at 23:31

## 2019-11-02 RX ADMIN — POTASSIUM CHLORIDE SCH MEQ: 10 TABLET, EXTENDED RELEASE ORAL at 09:56

## 2019-11-02 RX ADMIN — GABAPENTIN SCH MG: 400 CAPSULE ORAL at 23:31

## 2019-11-02 RX ADMIN — Medication SCH UNIT: at 09:57

## 2019-11-02 RX ADMIN — BUMETANIDE SCH MG: 1 TABLET ORAL at 09:53

## 2019-11-02 RX ADMIN — SIMVASTATIN SCH MG: 20 TABLET, FILM COATED ORAL at 23:31

## 2019-11-02 RX ADMIN — ISOSORBIDE MONONITRATE SCH MG: 30 TABLET, EXTENDED RELEASE ORAL at 09:56

## 2019-11-02 RX ADMIN — TRAMADOL HYDROCHLORIDE PRN MG: 50 TABLET, FILM COATED ORAL at 15:47

## 2019-11-02 RX ADMIN — WARFARIN SODIUM SCH MG: 5 TABLET ORAL at 17:53

## 2019-11-02 RX ADMIN — GABAPENTIN SCH MG: 400 CAPSULE ORAL at 09:55

## 2019-11-02 RX ADMIN — GABAPENTIN SCH MG: 400 CAPSULE ORAL at 14:47

## 2019-11-02 RX ADMIN — CARVEDILOL SCH MG: 3.12 TABLET, FILM COATED ORAL at 09:56

## 2019-11-02 RX ADMIN — PANTOPRAZOLE SODIUM SCH MG: 40 TABLET, DELAYED RELEASE ORAL at 09:56

## 2019-11-02 RX ADMIN — TRAMADOL HYDROCHLORIDE PRN MG: 50 TABLET, FILM COATED ORAL at 09:55

## 2019-11-02 NOTE — PCM.NOTE
Date and Time: 11/02/19 0916





Subjective Assessment: 





still somewhat lethargic





- Review of Systems


Constitutional: Lethargy, No Fever, No Chills


Eyes: No Symptoms


Ears, Nose, & Throat: No Symptoms


Respiratory: No Cough, No Short Of Breath


Cardiac: No Chest Pain, No Edema, No Syncope


Abdominal/Gastrointestinal: No Abdominal Pain, No Nausea, No Vomiting, No 

Diarrhea


Genitourinary Symptoms: No Dysuria


Musculoskeletal: No Back Pain, No Neck Pain


Skin: No Rash


Neurological: No Dizziness, No Focal Weakness, No Sensory Changes


Psychological: No Symptoms


Endocrine: No Symptoms


Hematologic/Lymphatic: No Symptoms


Immunological/Allergic: No Symptoms





Objective Exam


General Appearance: no apparent distress, alert


Neurologic Exam: alert, oriented x 3, cooperative, normal mood/affect, nml 

cerebellar function, sensation nml, No motor deficits


Skin Exam: normal color, warm, dry


Eye Exam: PERRL, EOMI, eyes nml inspection


Ears, Nose, Throat Exam: normal ENT inspection, pharynx normal, moist mucous 

membranes


Neck Exam: normal inspection, non-tender, supple, full range of motion


Respiratory Exam: normal breath sounds, lungs clear, No respiratory distress


Cardiovascular Exam: regular rate/rhythm, normal heart sounds


Gastrointestinal/Abdomen Exam: soft, No tenderness, No mass


Extremity Exam: normal inspection, normal range of motion


Back Exam: normal inspection, normal range of motion, No CVA tenderness, No 

vertebral tenderness


Pelvic Exam: deferred


Rectal Exam: deferred





OBJECTIVE DATA


Vital Signs: 


 Vital Signs - 24 hr











  Temp Pulse Resp BP Pulse Ox


 


 11/02/19 07:43  98.5 F  79  18  130/58  95


 


 11/02/19 04:00  97.7 F  77  20  119/58  95


 


 11/01/19 23:54  97.5 F  81  17  104/51  92 L


 


 11/01/19 19:35  97.6 F  80  18  141/67  92 L


 


 11/01/19 16:17  97.9 F  80  20  140/76  96


 


 11/01/19 13:00  97.7 F  79  22  149/67  97


 


 11/01/19 11:01  97.7 F  79  22  149/67  97








 Pain Assessment - Last Documented











Pain Intensity                 0


 


Pain Scale Used                0-10 Pain Scale











Intake and Output: 


 Intake & Output











 10/30/19 10/31/19 11/01/19 11/02/19





 11:59 11:59 11:59 11:59


 


Intake Total    2552


 


Output Total    800


 


Balance    1752


 


Weight    83.5 kg











Lab Results: 


 Lab Results-Last 24 Hours











  11/01/19 11/01/19 11/01/19 Range/Units





  11:55 13:48 17:50 


 


PT    20.2 H  (9.95-12.35)  SECONDS


 


INR    1.77  (0.8-3.0)  


 


Urine Color   YELLOW   (YELLOW)  


 


Urine Appearance   CLEAR   (CLEAR)  


 


Urine pH   7.0   (5-6)  


 


Ur Specific Gravity   1.015   (1.005-1.025)  


 


Urine Protein   30   (Negative)  


 


Urine Ketones   NEGATIVE   (NEGATIVE)  


 


Urine Blood   NEGATIVE   (0-5)  Mauricio/ul


 


Urine Nitrite   NEGATIVE   (NEGATIVE)  


 


Urine Bilirubin   NEGATIVE   (NEGATIVE)  


 


Urine Urobilinogen   NEGATIVE   (0-1)  mg/dL


 


Ur Leukocyte Esterase   NEGATIVE   (NEGATIVE)  


 


Urine WBC (Auto)   NONE   (0-5)  /HPF


 


Urine RBC (Auto)   NONE   (0-2)  /HPF


 


U Epithel Cells (Auto)   RARE   (FEW)  /HPF


 


Urine Bacteria (Auto)   NONE   (NEGATIVE)  /HPF


 


Urine Mucus (Auto)   SLIGHT   (NEGATIVE)  /HPF


 


Urine Culture Reflexed   NO   (NO)  


 


Urine Glucose   NEGATIVE   (NEGATIVE)  mg/dL


 


Urine Opiates Level     (NEGATIVE)  


 


Ur Methadone     (NEGATIVE)  


 


Urine Barbiturates     (NEGATIVE)  


 


Ur Phencyclidine (PCP)     (NEGATIVE)  


 


Urine Amphetamine     (NEGATIVE)  


 


U Benzodiazepine Level     (NEGATIVE)  


 


Urine Cocaine     (NEGATIVE)  


 


Urine Marijuana (THC)     (NEGATIVE)  


 


Influenza Type A Ag  NEGATIVE    (NEGATIVE)  


 


Influenza Type B Ag  NEGATIVE    (NEGATIVE)  


 


RSV (PCR)  NEGATIVE    (Negative)  














  11/01/19 Range/Units





  Unknown 


 


PT   (9.95-12.35)  SECONDS


 


INR   (0.8-3.0)  


 


Urine Color   (YELLOW)  


 


Urine Appearance   (CLEAR)  


 


Urine pH   (5-6)  


 


Ur Specific Gravity   (1.005-1.025)  


 


Urine Protein   (Negative)  


 


Urine Ketones   (NEGATIVE)  


 


Urine Blood   (0-5)  Mauricio/ul


 


Urine Nitrite   (NEGATIVE)  


 


Urine Bilirubin   (NEGATIVE)  


 


Urine Urobilinogen   (0-1)  mg/dL


 


Ur Leukocyte Esterase   (NEGATIVE)  


 


Urine WBC (Auto)   (0-5)  /HPF


 


Urine RBC (Auto)   (0-2)  /HPF


 


U Epithel Cells (Auto)   (FEW)  /HPF


 


Urine Bacteria (Auto)   (NEGATIVE)  /HPF


 


Urine Mucus (Auto)   (NEGATIVE)  /HPF


 


Urine Culture Reflexed   (NO)  


 


Urine Glucose   (NEGATIVE)  mg/dL


 


Urine Opiates Level  NEGATIVE  (NEGATIVE)  


 


Ur Methadone  NEGATIVE  (NEGATIVE)  


 


Urine Barbiturates  NEGATIVE  (NEGATIVE)  


 


Ur Phencyclidine (PCP)  NEGATIVE  (NEGATIVE)  


 


Urine Amphetamine  NEGATIVE  (NEGATIVE)  


 


U Benzodiazepine Level  NEGATIVE  (NEGATIVE)  


 


Urine Cocaine  NEGATIVE  (NEGATIVE)  


 


Urine Marijuana (THC)  POSITIVE  (NEGATIVE)  


 


Influenza Type A Ag   (NEGATIVE)  


 


Influenza Type B Ag   (NEGATIVE)  


 


RSV (PCR)   (Negative)  











Radiology Exams: 


 Radiology Procedures











 Category Date Time Status


 


 HEAD WITHOUT CONTRAST [CT] Stat Exams  11/01/19 11:46 Taken











Multi-Disciplinary Progress Notes: 


 Multi-Disciplinary Progress Notes





11/01/19 14:42 Case Management Note by Sydney Golden


Pt states she could benifit from Pt/INR machine at home at d/c. She has an 

attendant at home, but does not alway have an attendant to take her to get 

blood work done. Pt julia need to resume [employer] at d/c for attendant 

care. No further services anticipated. Will follow until d/c.





Initialized on 11/01/19 14:42 - END OF NOTE

















Assessment/Plan


(1) Headache


Current Visit: Yes   Status: Acute   


Qualifiers: 


   Headache type: unspecified 


Code(s): R51 - HEADACHE   





(2) Lethargic


Current Visit: Yes   Status: Acute   Code(s): R53.83 - OTHER FATIGUE

## 2019-11-03 VITALS — HEART RATE: 79 BPM | SYSTOLIC BLOOD PRESSURE: 130 MMHG | OXYGEN SATURATION: 93 % | DIASTOLIC BLOOD PRESSURE: 58 MMHG

## 2019-11-03 LAB
ALBUMIN SERPL-MCNC: 3.8 G/DL (ref 3.5–5)
ALP SERPL-CCNC: 101 U/L (ref 38–126)
ALT SERPL-CCNC: 10 U/L (ref 0–35)
ANION GAP SERPL CALC-SCNC: 14 MEQ/L (ref 5–15)
AST SERPL QL: 16 U/L (ref 14–36)
BILIRUB BLD-MCNC: 0.6 MG/DL (ref 0.2–1.3)
BUN SERPL-MCNC: 33 MG/DL (ref 7–17)
CALCIUM SPEC-MCNC: 9 MG/DL (ref 8.4–10.2)
CHLORIDE SERPL-SCNC: 103 MMOL/L (ref 98–107)
CO2 SERPL-SCNC: 26 MMOL/L (ref 22–30)
CREAT SERPL-MCNC: 1.87 MG/DL (ref 0.52–1.04)
GLUCOSE SERPL-MCNC: 121 MG/DL (ref 74–106)
HCT VFR BLD AUTO: 35.3 % (ref 35–47)
HGB BLD-MCNC: 11.1 GM/DL (ref 12–16)
MCH RBC QN AUTO: 29.9 PG (ref 26–32)
MCHC RBC AUTO-ENTMCNC: 31.4 G/DL (ref 32–36)
PLATELET # BLD AUTO: 128 K/MM3 (ref 150–450)
POTASSIUM SERPLBLD-SCNC: 3.7 MMOL/L (ref 3.5–5.1)
PROT SERPL-MCNC: 7 G/DL (ref 6.3–8.2)
RBC # BLD AUTO: 3.71 M/MM3 (ref 4.1–5.4)
SODIUM SERPL-SCNC: 140 MMOL/L (ref 137–145)
WBC # BLD AUTO: 11 K/MM3 (ref 4–10.5)

## 2019-11-03 RX ADMIN — BUMETANIDE SCH MG: 1 TABLET ORAL at 09:40

## 2019-11-03 RX ADMIN — PANTOPRAZOLE SODIUM SCH MG: 40 TABLET, DELAYED RELEASE ORAL at 09:41

## 2019-11-03 RX ADMIN — POTASSIUM CHLORIDE SCH MEQ: 10 TABLET, EXTENDED RELEASE ORAL at 09:43

## 2019-11-03 RX ADMIN — Medication SCH UNIT: at 09:43

## 2019-11-03 RX ADMIN — ISOSORBIDE MONONITRATE SCH MG: 30 TABLET, EXTENDED RELEASE ORAL at 09:42

## 2019-11-03 RX ADMIN — CARVEDILOL SCH MG: 3.12 TABLET, FILM COATED ORAL at 09:43

## 2019-11-03 RX ADMIN — ALLOPURINOL SCH MG: 300 TABLET ORAL at 09:40

## 2019-11-03 RX ADMIN — PRAMIPEXOLE DIHYDROCHLORIDE SCH MG: 0.5 TABLET ORAL at 09:42

## 2019-11-03 RX ADMIN — GABAPENTIN SCH MG: 400 CAPSULE ORAL at 09:41

## 2019-11-03 NOTE — PCM.NOTE
Date and Time: 11/03/19 0908





Subjective Assessment: 





doing ok





- Review of Systems


Constitutional: No Fever, No Chills


Eyes: No Symptoms


Ears, Nose, & Throat: No Symptoms


Respiratory: No Cough, No Short Of Breath


Cardiac: No Chest Pain, No Edema, No Syncope


Abdominal/Gastrointestinal: No Abdominal Pain, No Nausea, No Vomiting, No 

Diarrhea


Genitourinary Symptoms: No Dysuria


Musculoskeletal: No Back Pain, No Neck Pain


Skin: No Rash


Neurological: No Dizziness, No Focal Weakness, No Sensory Changes


Psychological: No Symptoms


Endocrine: No Symptoms


Hematologic/Lymphatic: No Symptoms


Immunological/Allergic: No Symptoms





Objective Exam


General Appearance: no apparent distress, alert


Neurologic Exam: alert, oriented x 3, cooperative, normal mood/affect, nml 

cerebellar function, sensation nml, No motor deficits


Skin Exam: normal color, warm, dry


Eye Exam: PERRL, EOMI, eyes nml inspection


Ears, Nose, Throat Exam: normal ENT inspection, pharynx normal, moist mucous 

membranes


Neck Exam: normal inspection, non-tender, supple, full range of motion


Respiratory Exam: normal breath sounds, lungs clear, No respiratory distress


Cardiovascular Exam: regular rate/rhythm, normal heart sounds


Gastrointestinal/Abdomen Exam: soft, No tenderness, No mass


Extremity Exam: normal inspection, normal range of motion


Back Exam: normal inspection, normal range of motion, No CVA tenderness, No 

vertebral tenderness


Pelvic Exam: deferred


Rectal Exam: deferred





OBJECTIVE DATA


Vital Signs: 


 Vital Signs - 24 hr











  Temp Pulse Resp BP Pulse Ox


 


 11/03/19 07:00  98.2 F  84  18  170/70  95


 


 11/03/19 03:00  98.5 F  82  18  135/63  94 L


 


 11/02/19 23:49  98.8 F  99 H  20  123/59  94 L


 


 11/02/19 20:00  97.9 F  92 H  18  109/52  95


 


 11/02/19 16:00  98.4 F  85  18  133/59  92 L


 


 11/02/19 11:53  98.4 F  94 H  18  113/57  94 L








 Pain Assessment - Last Documented











Pain Intensity                 0


 


Pain Scale Used                0-10 Pain Scale











Intake and Output: 


 Intake & Output











 10/31/19 11/01/19 11/02/19 11/03/19





 11:59 11:59 11:59 10:59


 


Intake Total   3132 3947


 


Output Total   800 3400


 


Balance   2332 547


 


Weight   83.5 kg 











Lab Results: 


 Lab Results-Last 24 Hours











  11/03/19 11/03/19 Range/Units





  06:07 06:07 


 


WBC  11.0 H   (4.0-10.5)  K/mm3


 


RBC  3.71 L   (4.1-5.4)  M/mm3


 


Hgb  11.1 L   (12.0-16.0)  gm/dl


 


Hct  35.3   (35-47)  %


 


MCV  95.1   ()  fl


 


MCH  29.9   (26-32)  pg


 


MCHC  31.4 L   (32-36)  g/dl


 


RDW  15.5 H   (11.5-14.0)  %


 


Plt Count  128 L   (150-450)  K/mm3


 


MPV  11.0 H   (6-9.5)  fl


 


Sodium   140  (137-145)  mmol/L


 


Potassium   3.7  (3.5-5.1)  mmol/L


 


Chloride   103  ()  mmol/L


 


Carbon Dioxide   26  (22-30)  mmol/L


 


Anion Gap   14.0  (5-15)  MEQ/L


 


BUN   33 H  (7-17)  mg/dL


 


Creatinine   1.87 H  (0.52-1.04)  mg/dL


 


Estimated GFR   29.3  ML/MIN


 


Glucose   121 H  ()  mg/dL


 


Calcium   9.0  (8.4-10.2)  mg/dL


 


Total Bilirubin   0.60  (0.2-1.3)  mg/dL


 


AST   16  (14-36)  U/L


 


ALT   10  (0-35)  U/L


 


Alkaline Phosphatase   101  ()  U/L


 


Serum Total Protein   7.0  (6.3-8.2)  g/dL


 


Albumin   3.8  (3.5-5.0)  g/dL











Radiology Exams: 


 Radiology Procedures











 Category Date Time Status


 


 CHEST 2 VIEWS (PA AND LAT) Routine Exams  11/03/19 07:30 Taken


 


 HEAD WITHOUT CONTRAST [CT] Stat Exams  11/01/19 11:46 Taken














Assessment/Plan


(1) Headache


Current Visit: Yes   Status: Acute   


Qualifiers: 


   Headache type: unspecified   Headache chronicity pattern: chronic headache   

Intractability: not intractable   Qualified Code(s): R51 - Headache   


Assessment & Plan: 


improved


Code(s): R51 - HEADACHE   





(2) Lethargic


Current Visit: Yes   Status: Acute   


Assessment & Plan: 


resolved


Code(s): R53.83 - OTHER FATIGUE

## 2019-11-03 NOTE — PCM.DS
Discharge Summary


Date of Admission: 


11/01/19 10:40





Admitting Physician: 


MARLEEN RIVAS MD





Primary Care Provider: 


JUAN A HAGEN








Allergies


Allergies





amoxicillin Allergy (Verified 11/04/18 18:51)


 


oxytetracycline [From Terramycin] Allergy (Verified 11/04/18 18:51)


 


oxytetracycline HCl [From Terramycin] Allergy (Verified 11/04/18 18:51)


 


Penicillins Allergy (Verified 11/04/18 18:51)


 











Hospital Summary





- Hospital Course


Hospital Course: 








 Last Vital Signs











Temp  97.9 F   11/03/19 11:00


 


Pulse  79   11/03/19 11:00


 


Resp  18   11/03/19 11:00


 


BP  130/58   11/03/19 11:00


 


Pulse Ox  93 L  11/03/19 11:00








Allergies





amoxicillin Allergy (Verified 11/04/18 18:51)


 


oxytetracycline [From Terramycin] Allergy (Verified 11/04/18 18:51)


 


oxytetracycline HCl [From Terramycin] Allergy (Verified 11/04/18 18:51)


 


Penicillins Allergy (Verified 11/04/18 18:51)


 





Active Medications





Allopurinol (Zyloprim 300 Mg***)  150 mg PO DAILY LUDY


   Stop: 12/02/19 09:59


   Last Admin: 11/03/19 09:40 Dose:  150 mg


Bumetanide (Bumex 1 Mg***)  2 mg PO DAILY LUDY


   Stop: 12/02/19 09:59


   Last Admin: 11/03/19 09:40 Dose:  2 mg


Carvedilol (Coreg 3.125 Mg***)  3.125 mg PO BID LUDY


   Stop: 12/01/19 21:59


   Last Admin: 11/03/19 09:43 Dose:  3.125 mg


Cholecalciferol (Vitamin D***)  1,000 unit PO DAILY LUDY


   Stop: 12/02/19 09:59


   Last Admin: 11/03/19 09:43 Dose:  1,000 unit


Gabapentin (Neurontin 400 Mg***)  400 mg PO TID LUDY


   Stop: 12/01/19 16:29


   Last Admin: 11/03/19 09:41 Dose:  400 mg


Sodium Chloride (Sodium Chloride 0.9% 1000 Ml)  1,000 mls @ 75 mls/hr IV 

[id number] LUDY


   Stop: 12/01/19 12:29


   Last Admin: 11/03/19 03:26 Dose:  75 mls/hr


Isosorbide Mononitrate (Imdur 30 Mg***)  30 mg PO DAILY LUDY


   Stop: 12/02/19 09:59


   Last Admin: 11/03/19 09:42 Dose:  30 mg


Metolazone (Zaroxolyn 2.5 Mg**)  2.5 mg PO QOD LUDY


   Stop: 12/02/19 09:59


   Last Admin: 11/02/19 09:56 Dose:  2.5 mg


Miscellaneous Information (Medication Intervention)  1 each PO .RN TO CHECK ON 

LUDY


   Stop: 12/01/19 16:59


Nitroglycerin (Nitrostat 0.4 Mg Tablet***)  0.4 mg SL Q5MIN PRN MR X 3 PRN


   PRN Reason: angina


   Stop: 12/01/19 16:29


Ondansetron HCl (Zofran 4 Mg/2 Ml Vial**)  4 mg IV Q6H PRN PRN


   PRN Reason: NAUSEA/VOMITING


   Stop: 12/01/19 12:26


   Last Admin: 11/01/19 18:39 Dose:  4 mg


Pantoprazole Sodium (Protonix 40mg Tablet***)  40 mg PO QAM LUDY


   Stop: 12/02/19 09:59


   Last Admin: 11/03/19 09:41 Dose:  40 mg


Potassium Chloride (Klor Con 10 Meq***)  10 meq PO DAILY LUDY


   Stop: 12/02/19 09:59


   Last Admin: 11/03/19 09:43 Dose:  10 meq


Pramipexole Dihydrochloride (Mirapex 0.5 Mg Tablet***)  1 mg PO BID LUDY


   Stop: 12/01/19 21:59


   Last Admin: 11/03/19 09:42 Dose:  1 mg


Simvastatin (Zocor 20mg**)  40 mg PO HS Pending sale to [facility]


   Stop: 12/01/19 21:59


   Last Admin: 11/02/19 23:31 Dose:  40 mg


Tramadol HCl (Ultram 50 Mg***)  50 mg PO Q6H PRN PRN


   PRN Reason: PAIN


   Stop: 12/01/19 12:26


   Last Admin: 11/02/19 15:47 Dose:  50 mg


Warfarin Sodium (Coumadin 5 Mg***)  5 mg PO COU LUDY


   Stop: 12/01/19 17:59


   Last Admin: 11/02/19 17:53 Dose:  5 mg





 Intake & Output











 11/03/19 11/04/19





 11:59 11:59


 


Intake Total  


 


Output Total  


 


Balance  








 Orders





11/03/19 07:30


CHEST 2 VIEWS (PA AND LAT) Routine 








 Lab Tests











  11/03/19 11/03/19





  06:07 06:07


 


WBC  11.0 H 


 


RBC  3.71 L 


 


Hgb  11.1 L 


 


Hct  35.3 


 


MCV  95.1 


 


MCH  29.9 


 


MCHC  31.4 L 


 


RDW  15.5 H 


 


Plt Count  128 L 


 


MPV  11.0 H 


 


Sodium   140


 


Potassium   3.7


 


Chloride   103


 


Carbon Dioxide   26


 


Anion Gap   14.0


 


BUN   33 H


 


Creatinine   1.87 H


 


Estimated GFR   29.3


 


Glucose   121 H


 


Calcium   9.0


 


Total Bilirubin   0.60


 


AST   16


 


ALT   10


 


Alkaline Phosphatase   101


 


Serum Total Protein   7.0


 


Albumin   3.8











 Chief Complaint





Diagnosis                        weakness; headache; decreased mental status





 Allergies











Allergy/AdvReac Type Severity Reaction Status Date / Time


 


amoxicillin Allergy   Verified 11/04/18 18:51


 


oxytetracycline Allergy   Verified 11/04/18 18:51





[From Terramycin]     


 


oxytetracycline HCl Allergy   Verified 11/04/18 18:51





[From Terramycin]     


 


Penicillins Allergy   Verified 11/04/18 18:51








 Vital Signs (Last 24 hours)











  Temp Pulse Resp BP Pulse Ox


 


 11/03/19 11:00  97.9 F  79  18  130/58  93 L


 


 11/03/19 07:00  98.2 F  84  18  170/70  95


 


 11/03/19 03:00  98.5 F  82  18  135/63  94 L


 


 11/02/19 23:49  98.8 F  99 H  20  123/59  94 L


 


 11/02/19 20:00  97.9 F  92 H  18  109/52  95


 


 11/02/19 16:00  98.4 F  85  18  133/59  92 L








 Home Medications











 Medication  Instructions  Recorded  Confirmed  Last Taken  Type


 


Allopurinol 100 mg*** [Zyloprim 150 mg PO DAILY 11/01/19 11/01/19 Unknown 

History





100 mg***]     


 


Bumetanide 2 mg PO DAILY 11/01/19 11/01/19 Unknown History


 


Calcitriol 0.25 mcg PO DAILY 11/01/19 11/01/19 Unknown History


 


Carvedilol 3.125 mg*** [Coreg 3.125 mg PO BID 11/01/19 11/01/19 Unknown History





3.125 MG***]     


 


Cholecalciferol (Vitamin D3) 1,000 unit PO DAILY 11/01/19 11/01/19 Unknown 

History





[Vitamin D3]     


 


Gabapentin 400 mg PO TID 11/01/19 11/01/19 Unknown History


 


Isosorbide Mononitrate [Isosorbide 30 mg PO DAILY 11/01/19 11/01/19 Unknown 

History





Mononitrate ER]     


 


Nitroglycerin 0.4 mg Tablet*** 0.4 mg SL Q5MIN PRN MR X 3 PRN 11/01/19 11/01/19 

Unknown History





[Nitrostat 0.4 MG Tablet***]     


 


PANTOPRAZOLE 40 mg Tablet*** 40 mg PO QAM 11/01/19 11/01/19 Unknown History





[Protonix 40MG Tablet***]     


 


Potassium Chloride 10 Meq Tab* 10 meq PO DAILY 11/01/19 11/01/19 Unknown History





[Klor Con 10 MEQ***]     


 


Pramipexole Di-HCl [Mirapex] 1 mg PO BID 11/01/19 11/01/19 10/31/19 History


 


Rosuvastatin Calcium 20 mg PO HS 11/01/19 11/01/19 Unknown History


 


Warfarin Sodium 5 mg*** [Coumadin 5 mg PO DAILY 11/01/19 11/01/19 Unknown 

History





5 MG***]     


 


metOLazone [Metolazone] 2.5 mg PO UD 11/01/19 11/01/19 Unknown History








 Current Medications











Generic Name Dose Route Start Last Admin





  Trade Name Wilfridq  PRN Reason Stop Dose Admin


 


Allopurinol  150 mg  11/02/19 10:00  11/03/19 09:40





  Zyloprim 300 Mg***  PO  12/02/19 09:59  150 mg





  DAILY LUDY   Administration





     





     





     





     


 


Bumetanide  2 mg  11/02/19 10:00  11/03/19 09:40





  Bumex 1 Mg***  PO  12/02/19 09:59  2 mg





  DAILY LUDY   Administration





     





     





     





     


 


Carvedilol  3.125 mg  11/01/19 22:00  11/03/19 09:43





  Coreg 3.125 Mg***  PO  12/01/19 21:59  3.125 mg





  BID LUDY   Administration





     





     





     





     


 


Cholecalciferol  1,000 unit  11/02/19 10:00  11/03/19 09:43





  Vitamin D***  PO  12/02/19 09:59  1,000 unit





  DAILY LUDY   Administration





     





     





     





     


 


Gabapentin  400 mg  11/01/19 16:30  11/03/19 09:41





  Neurontin 400 Mg***  PO  12/01/19 16:29  400 mg





  TID LUDY   Administration





     





     





     





     


 


Sodium Chloride  1,000 mls @ 75 mls/hr  11/01/19 12:30  11/03/19 03:26





  Sodium Chloride 0.9% 1000 Ml  IV  12/01/19 12:29  75 mls/hr





  [id number] LUDY   Administration





     





     





     





     


 


Isosorbide Mononitrate  30 mg  11/02/19 10:00  11/03/19 09:42





  Imdur 30 Mg***  PO  12/02/19 09:59  30 mg





  DAILY LUDY   Administration





     





     





     





     


 


Metolazone  2.5 mg  11/02/19 10:00  11/02/19 09:56





  Zaroxolyn 2.5 Mg**  PO  12/02/19 09:59  2.5 mg





  QOD LUDY   Administration





     





     





     





     


 


Miscellaneous Information  1 each  11/01/19 17:00  





  Medication Intervention  PO  12/01/19 16:59  





  .RN TO CHECK ON LUDY   





     





     





     





     


 


Nitroglycerin  0.4 mg  11/01/19 16:30  





  Nitrostat 0.4 Mg Tablet***  SL  12/01/19 16:29  





  Q5MIN PRN MR X 3 PRN   





  angina   





     





     





     


 


Ondansetron HCl  4 mg  11/01/19 12:27  11/01/19 18:39





  Zofran 4 Mg/2 Ml Vial**  IV  12/01/19 12:26  4 mg





  Q6H PRN PRN   Administration





  NAUSEA/VOMITING   





     





     





     


 


Pantoprazole Sodium  40 mg  11/02/19 10:00  11/03/19 09:41





  Protonix 40mg Tablet***  PO  12/02/19 09:59  40 mg





  QAM LUDY   Administration





     





     





     





     


 


Potassium Chloride  10 meq  11/02/19 10:00  11/03/19 09:43





  Klor Con 10 Meq***  PO  12/02/19 09:59  10 meq





  DAILY LUDY   Administration





     





     





     





     


 


Pramipexole Dihydrochloride  1 mg  11/01/19 22:00  11/03/19 09:42





  Mirapex 0.5 Mg Tablet***  PO  12/01/19 21:59  1 mg





  BID LUDY   Administration





     





     





     





     


 


Simvastatin  40 mg  11/01/19 22:00  11/02/19 23:31





  Zocor 20mg**  PO  12/01/19 21:59  40 mg





  HS LUDY   Administration





     





     





     





     


 


Tramadol HCl  50 mg  11/01/19 12:27  11/02/19 15:47





  Ultram 50 Mg***  PO  12/01/19 12:26  50 mg





  Q6H PRN PRN   Administration





  PAIN   





     





     





     


 


Warfarin Sodium  5 mg  11/01/19 18:00  11/02/19 17:53





  Coumadin 5 Mg***  PO  12/01/19 17:59  5 mg





  COU LUDY   Administration





     





     





     





     














Discontinued Medications














Generic Name Dose Route Start Last Admin





  Trade Name Freq  PRN Reason Stop Dose Admin


 


Pramipexole Dihydrochloride  Confirm  11/01/19 18:37  





  Mirapex 0.5 Mg Tablet***  Administered  11/01/19 18:38  





  Dose   





  1 mg   





  .ROUTE   





  .STK-MED ONE   





     





     





     





     








 Intake & Output (Last 24 hours)











 11/01/19 11/02/19 11/03/19 11/04/19





 12:59 12:59 11:59 11:59


 


Intake Total    


 


Output Total    


 


Balance    


 


Weight    








 Laboratory Results (Last 24 hours)











  11/03/19 11/03/19





  06:07 06:07


 


WBC   11.0 H


 


RBC   3.71 L


 


Hgb   11.1 L


 


Hct   35.3


 


MCV   95.1


 


MCH   29.9


 


MCHC   31.4 L


 


RDW   15.5 H


 


Plt Count   128 L


 


MPV   11.0 H


 


Sodium  140 


 


Potassium  3.7 


 


Chloride  103 


 


Carbon Dioxide  26 


 


Anion Gap  14.0 


 


BUN  33 H 


 


Creatinine  1.87 H 


 


Estimated GFR  29.3 


 


Glucose  121 H 


 


Calcium  9.0 


 


Total Bilirubin  0.60 


 


AST  16 


 


ALT  10 


 


Alkaline Phosphatase  101 


 


Serum Total Protein  7.0 


 


Albumin  3.8 








 Orders (Last 24 hours)











 Category Date Time Status


 


 CHEST 2 VIEWS (PA AND LAT) Routine Exams  11/03/19 07:30 Taken


 


 CBC AM.LAB Lab  11/03/19 06:07 Completed


 


 CMP AM.LAB Lab  11/03/19 06:07 Completed














- Vitals & Intake/Output


Vital Signs: 





 Vital Signs











Temperature  97.9 F   11/03/19 11:00


 


Pulse Rate  79   11/03/19 11:00


 


Respiratory Rate  18   11/03/19 11:00


 


Blood Pressure  130/58   11/03/19 11:00


 


O2 Sat by Pulse Oximetry  93 L  11/03/19 11:00








 Oxygen-Last Documented











O2 Percentage                  2 Liters = 28%














Intake & Output: 





 Intake & Output











 11/01/19 11/02/19 11/03/19 11/04/19





 12:59 12:59 11:59 11:59


 


Intake Total    


 


Output Total    


 


Balance    


 


Weight    














- Lab


Result Diagrams: 


 11/03/19 06:07





 11/03/19 06:07


Lab Results-Last 24 Hrs: 





 Lab Results-Last 24 Hours











  11/03/19 11/03/19 Range/Units





  06:07 06:07 


 


WBC  11.0 H   (4.0-10.5)  K/mm3


 


RBC  3.71 L   (4.1-5.4)  M/mm3


 


Hgb  11.1 L   (12.0-16.0)  gm/dl


 


Hct  35.3   (35-47)  %


 


MCV  95.1   ()  fl


 


MCH  29.9   (26-32)  pg


 


MCHC  31.4 L   (32-36)  g/dl


 


RDW  15.5 H   (11.5-14.0)  %


 


Plt Count  128 L   (150-450)  K/mm3


 


MPV  11.0 H   (6-9.5)  fl


 


Sodium   140  (137-145)  mmol/L


 


Potassium   3.7  (3.5-5.1)  mmol/L


 


Chloride   103  ()  mmol/L


 


Carbon Dioxide   26  (22-30)  mmol/L


 


Anion Gap   14.0  (5-15)  MEQ/L


 


BUN   33 H  (7-17)  mg/dL


 


Creatinine   1.87 H  (0.52-1.04)  mg/dL


 


Estimated GFR   29.3  ML/MIN


 


Glucose   121 H  ()  mg/dL


 


Calcium   9.0  (8.4-10.2)  mg/dL


 


Total Bilirubin   0.60  (0.2-1.3)  mg/dL


 


AST   16  (14-36)  U/L


 


ALT   10  (0-35)  U/L


 


Alkaline Phosphatase   101  ()  U/L


 


Serum Total Protein   7.0  (6.3-8.2)  g/dL


 


Albumin   3.8  (3.5-5.0)  g/dL














- Radiology Exams


Ordered Rad Exams-Entire Visit: 





 Radiology Procedures











 Category Date Time Status


 


 CHEST 2 VIEWS (PA AND LAT) Routine Exams  11/03/19 07:30 Taken














- Procedures and Test


Procedures and Tests throughout Hospitalization: 





 Therapy Orders & Screens





11/01/19 14:14


Smoking Cessation Education ONCE 


   Comment: 


   Diagnosis: weakness; headache; decreased mental status


   Smoking Status: Light tobacco smoker


   How long have you smoked: yrs


   Have you smoked in the past 12 months: Yes


   Approximately how many cigarettes per day: pt unsure


   Do you dip or chew tobacco: No


   If,Former Smoker,when did you quit: 1.5 years














Discharge Exam


General Appearance: no apparent distress, alert


Neurologic Exam: alert, oriented x 3, cooperative, normal mood/affect, nml 

cerebellar function, sensation nml, No motor deficits


Eye Exam: PERRL, EOMI, eyes nml inspection


Ears, Nose, Throat Exam: normal ENT inspection, pharynx normal, moist mucous 

membranes


Neck Exam: normal inspection, non-tender, supple, full range of motion


Respiratory Exam: normal breath sounds, lungs clear, No respiratory distress


Cardiovascular Exam: regular rate/rhythm, normal heart sounds


Gastrointestinal/Abdomen Exam: soft, No tenderness, No mass


Pelvic Exam: deferred


Rectal Exam: deferred


Back Exam: normal inspection, normal range of motion, No CVA tenderness, No 

vertebral tenderness


Extremity Exam: normal inspection, normal range of motion


Skin Exam: normal color, warm, dry





Final Diagnosis/Problem List





- Final Discharge Diagnosis/Problem


(1) Headache


Current Visit: Yes   Status: Resolved   Code(s): R51 - HEADACHE   





(2) Lethargic


Current Visit: Yes   Status: Resolved   Code(s): R53.83 - OTHER FATIGUE   





- Discharge


Discharge Date: 11/03/19


Disposition: Home, Self-Care


Condition: Stable


Prescriptions: 


Continue


   Bumetanide 2 mg PO DAILY


   Allopurinol 100 mg*** [Zyloprim 100 mg***] 150 mg PO DAILY


   Calcitriol 0.25 mcg PO DAILY


   Warfarin Sodium 5 mg*** [Coumadin 5 MG***] 5 mg PO DAILY


   Cholecalciferol (Vitamin D3) [Vitamin D3] 1,000 unit PO DAILY


   Isosorbide Mononitrate [Isosorbide Mononitrate ER] 30 mg PO DAILY


   Rosuvastatin Calcium 20 mg PO HS


   Pramipexole Di-HCl [Mirapex] 1 mg PO BID


   Potassium Chloride 10 Meq Tab* [Klor Con 10 MEQ***] 10 meq PO DAILY


   PANTOPRAZOLE 40 mg Tablet*** [Protonix 40MG Tablet***] 40 mg PO QAM


   metOLazone [Metolazone] 2.5 mg PO UD


   Gabapentin 400 mg PO TID


   Carvedilol 3.125 mg*** [Coreg 3.125 MG***] 3.125 mg PO BID


   Nitroglycerin 0.4 mg Tablet*** [Nitrostat 0.4 MG Tablet***] 0.4 mg SL Q5MIN 

PRN MR X 3 PRN


     PRN Reason: angina


Follow up with: 


JUAN A HAGEN [Primary Care Provider] - 1 Week

## 2019-11-03 NOTE — XRAY
Indication: Cough.



Comparison: September 21, 2018.



PA/lateral chest demonstrates new patchy left upper lobe infiltrate.  Stable

bilateral mid to lower lung subsegmental atelectasis/scarring and a few

incidental calcified granulomas.  Heart is not enlarged.  Bony thorax intact

again with mild osteopenia and degenerative changes.



Impression: New left upper lobe infiltrate.  Stable atelectasis/scarring and

evidence for old granulomatous disease.



Comment: Preliminary interpretation was made by [facility].  No discrepancy.

## 2019-11-04 NOTE — XRAY
Indication: Severe headache.  History TIA.



Multiple contiguous axial images obtained through the head without contrast.



Comparison: None



Age-appropriate global atrophy, moderate periventricular degenerative

micro-ischemia bilaterally, and high right parietal old infarct with

underlying gliosis.  No acute intracranial hemorrhage, hydrocephalus, or mass

effect.  Fourth ventricle is midline.  Bony calvarium is intact.  There is

chronic appearing mild mucoperiosteal thickening of both ethmoid and right

sphenoid sinuses.  Previous bilateral maxillary sinus antrectomy surgery.

Mastoid air cells are clear.



Impression:

1.  Normal aging brain including atrophy and degenerative micro-ischemia.  Old

right parietal infarct.

2.  No acute intracranial abnormalities.

3.  Incidental chronic paranasal sinus disease.



CTDI is 62.81

## 2020-01-02 ENCOUNTER — HOSPITAL ENCOUNTER (OUTPATIENT)
Dept: HOSPITAL 33 - ED | Age: 60
Setting detail: OBSERVATION
LOS: 1 days | Discharge: HOME | End: 2020-01-03
Attending: FAMILY MEDICINE | Admitting: FAMILY MEDICINE
Payer: MEDICARE

## 2020-01-02 DIAGNOSIS — G47.30: ICD-10-CM

## 2020-01-02 DIAGNOSIS — Z79.01: ICD-10-CM

## 2020-01-02 DIAGNOSIS — Z79.899: ICD-10-CM

## 2020-01-02 DIAGNOSIS — J18.1: ICD-10-CM

## 2020-01-02 DIAGNOSIS — S32.009A: Primary | ICD-10-CM

## 2020-01-02 DIAGNOSIS — I10: ICD-10-CM
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
## 2020-01-02 LAB
ALBUMIN SERPL-MCNC: 3.9 G/DL (ref 3.5–5)
ALP SERPL-CCNC: 116 U/L (ref 38–126)
ALT SERPL-CCNC: 12 U/L (ref 0–35)
ANION GAP SERPL CALC-SCNC: 12 MEQ/L (ref 5–15)
AST SERPL QL: 20 U/L (ref 14–36)
BASOPHILS # BLD AUTO: 0.02 10*3/UL (ref 0–0.4)
BASOPHILS NFR BLD AUTO: 0.2 % (ref 0–0.4)
BILIRUB BLD-MCNC: 0.7 MG/DL (ref 0.2–1.3)
BUN SERPL-MCNC: 44 MG/DL (ref 7–17)
CALCIUM SPEC-MCNC: 9.9 MG/DL (ref 8.4–10.2)
CHLORIDE SERPL-SCNC: 102 MMOL/L (ref 98–107)
CO2 SERPL-SCNC: 30 MMOL/L (ref 22–30)
CREAT SERPL-MCNC: 1.67 MG/DL (ref 0.52–1.04)
EOSINOPHIL # BLD AUTO: 0.32 10*3/UL (ref 0–0.5)
FLUAV AG NPH QL IA: NEGATIVE
FLUBV AG NPH QL IA: NEGATIVE
GLUCOSE SERPL-MCNC: 127 MG/DL (ref 74–106)
HCT VFR BLD AUTO: 36 % (ref 35–47)
HGB BLD-MCNC: 11.3 GM/DL (ref 12–16)
INR PPP: 2.05 (ref 0.8–3)
LYMPHOCYTES # SPEC AUTO: 1.87 10*3/UL (ref 1–4.6)
MCH RBC QN AUTO: 29.9 PG (ref 26–32)
MCHC RBC AUTO-ENTMCNC: 31.4 G/DL (ref 32–36)
MONOCYTES # BLD AUTO: 0.59 10*3/UL (ref 0–1.3)
PLATELET # BLD AUTO: 224 K/MM3 (ref 150–450)
POTASSIUM SERPLBLD-SCNC: 3.5 MMOL/L (ref 3.5–5.1)
PROT SERPL-MCNC: 7.4 G/DL (ref 6.3–8.2)
PROTHROMBIN TIME: 23.5 SECONDS (ref 9.95–12.35)
RBC # BLD AUTO: 3.78 M/MM3 (ref 4.1–5.4)
RSV AG SPEC QL IA: NEGATIVE
SODIUM SERPL-SCNC: 140 MMOL/L (ref 137–145)
WBC # BLD AUTO: 11.8 K/MM3 (ref 4–10.5)

## 2020-01-02 PROCEDURE — 83605 ASSAY OF LACTIC ACID: CPT

## 2020-01-02 PROCEDURE — 72131 CT LUMBAR SPINE W/O DYE: CPT

## 2020-01-02 PROCEDURE — 70450 CT HEAD/BRAIN W/O DYE: CPT

## 2020-01-02 PROCEDURE — 93005 ELECTROCARDIOGRAM TRACING: CPT

## 2020-01-02 PROCEDURE — 96365 THER/PROPH/DIAG IV INF INIT: CPT

## 2020-01-02 PROCEDURE — 71045 X-RAY EXAM CHEST 1 VIEW: CPT

## 2020-01-02 PROCEDURE — 93268 ECG RECORD/REVIEW: CPT

## 2020-01-02 PROCEDURE — 80053 COMPREHEN METABOLIC PANEL: CPT

## 2020-01-02 PROCEDURE — 99285 EMERGENCY DEPT VISIT HI MDM: CPT

## 2020-01-02 PROCEDURE — 87040 BLOOD CULTURE FOR BACTERIA: CPT

## 2020-01-02 PROCEDURE — 85025 COMPLETE CBC W/AUTO DIFF WBC: CPT

## 2020-01-02 PROCEDURE — 83880 ASSAY OF NATRIURETIC PEPTIDE: CPT

## 2020-01-02 PROCEDURE — 80048 BASIC METABOLIC PNL TOTAL CA: CPT

## 2020-01-02 PROCEDURE — 85610 PROTHROMBIN TIME: CPT

## 2020-01-02 PROCEDURE — 72125 CT NECK SPINE W/O DYE: CPT

## 2020-01-02 PROCEDURE — 73502 X-RAY EXAM HIP UNI 2-3 VIEWS: CPT

## 2020-01-02 PROCEDURE — G0378 HOSPITAL OBSERVATION PER HR: HCPCS

## 2020-01-02 PROCEDURE — 36415 COLL VENOUS BLD VENIPUNCTURE: CPT

## 2020-01-02 PROCEDURE — 87631 RESP VIRUS 3-5 TARGETS: CPT

## 2020-01-02 RX ADMIN — ACETAMINOPHEN PRN MG: 325 TABLET ORAL at 14:38

## 2020-01-02 RX ADMIN — MORPHINE SULFATE PRN MG: 4 INJECTION, SOLUTION INTRAMUSCULAR; INTRAVENOUS at 22:57

## 2020-01-02 RX ADMIN — GABAPENTIN SCH MG: 400 CAPSULE ORAL at 22:58

## 2020-01-02 RX ADMIN — MORPHINE SULFATE PRN MG: 4 INJECTION, SOLUTION INTRAMUSCULAR; INTRAVENOUS at 16:30

## 2020-01-02 RX ADMIN — PRAMIPEXOLE DIHYDROCHLORIDE SCH MG: 0.5 TABLET ORAL at 22:58

## 2020-01-02 RX ADMIN — FAMOTIDINE SCH MG: 20 TABLET, FILM COATED ORAL at 22:58

## 2020-01-02 RX ADMIN — CYCLOBENZAPRINE HYDROCHLORIDE SCH MG: 10 TABLET, FILM COATED ORAL at 22:58

## 2020-01-02 RX ADMIN — CARVEDILOL SCH MG: 3.12 TABLET, FILM COATED ORAL at 22:57

## 2020-01-02 NOTE — ERPHSYRPT
- History of Present Illness


Source: patient, EMS


Exam Limitations: no limitations


Patient Subjective Stated Complaint: pt states she has hx of sleep walking, pt 

states that she fell yesterday during sleep walking, pt states that her left 

hip hurts and left shoulder hurts, pt states that she thinks she hit her chin 

during the fall, pt states that she is on warfin


Triage Nursing Assessment: pt came into the er, pt is axo x, pt states 8/10 

pain to left hip and left shoulder, pt has bruising to the middle of the side 

left of her back, no deformities or shortening present to left leg, pt is 

hypertensive, pt has 3+ pitting edema to lower extremities


Occurred: just prior to arrival


Reason for Fall: fainted (sleep walking)


Injuries/Pain Location: back


Loss of Consciousness: no loss of consciousness


Quality: sharpness


Severity of Pain-Max: moderate


Severity of Pain-Current: moderate


Modifying Factors: Improves With: movement


Associated Symptoms (Fall): back pain


Hx Tetanus, Diphtheria Vaccination/Date Given: No


Hx Influenza Vaccination/Date Given: No


Hx Pneumococcal Vaccination/Date Given: Yes





<DONALDO HILLIARD - Last Filed: 01/02/20 09:56>





<TAY CACERES - Last Filed: 01/02/20 13:09>





- History of Present Illness


Time Seen by Provider: 01/02/20 07:25


Allergies/Adverse Reactions: 








amoxicillin Allergy (Verified 01/02/20 06:37)


 


oxytetracycline [From Terramycin] Allergy (Verified 01/02/20 06:37)


 


oxytetracycline HCl [From Terramycin] Allergy (Verified 01/02/20 06:37)


 


Penicillins Allergy (Verified 01/02/20 06:37)


 





Home Medications: 








Allopurinol 100 mg*** [Zyloprim 100 mg***] 100 mg PO DAILY 11/01/19 [History]


Bumetanide 2 mg PO DAILY 11/01/19 [History]


Calcitriol 0.25 mcg PO DAILY 11/01/19 [History]


Carvedilol 3.125 mg*** [Coreg 3.125 MG***] 3.125 mg PO BID 11/01/19 [History]


Cholecalciferol (Vitamin D3) [Vitamin D3] 1,000 unit PO DAILY 11/01/19 [History]


Gabapentin 400 mg PO TID 11/01/19 [History]


Isosorbide Mononitrate [Isosorbide Mononitrate ER] 30 mg PO DAILY 11/01/19 [

History]


Nitroglycerin 0.4 mg Tablet*** [Nitrostat 0.4 MG Tablet***] 0.4 mg SL Q5MIN PRN 

MR X 3 PRN 11/01/19 [History]


PANTOPRAZOLE 40 mg Tablet*** [Protonix 40MG Tablet***] 40 mg PO QAM 11/01/19 [

History]


Potassium Chloride 10 Meq Tab* [Klor Con 10 MEQ***] 10 meq PO DAILY 11/01/19 [

History]


Pramipexole Di-HCl [Mirapex] 1 mg PO BID 11/01/19 [History]


Rosuvastatin Calcium 20 mg PO HS 11/01/19 [History]


Warfarin Sodium 5 mg*** [Coumadin 5 MG***] 5 mg PO DAILY 11/01/19 [History]


metOLazone [Metolazone] 2.5 mg PO UD 11/01/19 [History]


Cyclobenzaprine HCl 10 mg*** [Cyclobenzaprine 10 MG***] 10 mg PO TID 01/02/20 [

History]


buPROPion HCl [Bupropion HCl Sr] 200 mg PO BID 01/02/20 [History]








- Review of Systems


Constitutional: No Symptoms


Eyes: No Symptoms


Ears, Nose, & Throat: No Symptoms


Respiratory: Cough


Cardiac: No Symptoms


Abdominal/Gastrointestinal: No Symptoms


Genitourinary Symptoms: No Symptoms


Musculoskeletal: No Symptoms


Skin: No Symptoms


Neurological: No Symptoms


Psychological: No Symptoms


Endocrine: No Symptoms


Hematologic/Lymphatic: No Symptoms


Immunological/Allergic: No Symptoms





<DONALDO HILLIARD - Last Filed: 01/02/20 09:56>





- Past Medical History


Pertinent Past Medical History: Yes


Neurological History: TIA


ENT History: No Pertinent History


Cardiac History: Coronary Artery Disease, Hypertension, Myocardial Infarction (

MI)


Respiratory History: CHF, Sleep Apnea


Endocrine Medical History: Other


Musculoskeletal History: Arthritis


GI Medical History: No Pertinent History


 History: No Pertinent History


Psycho-Social History: Anxiety, Depression


Female Reproductive Disorders: No Pertinent History


Other Medical History: LUPUS ANTICOAGULANT DISORDER, MORBID OBESITY, GOUT, 

ANXIETY, DEPRESSION, VENTRICULAR ARRHYTHMIA, CKD STAGE 4, OSTEOPOROSIS, 

HYPERGLYCEMIA, FRACTURE PATELLA 2016





- Past Surgical History


Past Surgical History: Yes


Cardiac: Cardiac Catheterization, Cardiac Stent


Respiratory: No Pertinent History


Gastrointestinal: No Pertinent History


Genitourinary: No Pertinent History


Musculoskeletal: Orthopedic Surgery


Female Surgical History: Hysterectomy, Dilation & Curettage


Other Surgical History: CARPAL TUNNEL-RIGHT WRIST, RIGHT FOOT





- Social History


Smoking Status: Light tobacco smoker


How long have you smoked: 0.25


Exposure to second hand smoke: Yes


Drug Use: marijuana


Patient Lives Alone: Yes





- Female History


Hx Pregnant Now: No





<DONALDO HILLIARD - Last Filed: 01/02/20 09:56>





- Howard Coma Score


Best Eye Response (Jaron): (4) open spontaneously


Best Verbal Response (Howard): (5) oriented


Best Motor Response (Howard): (6) obeys commands


Howard Total: 15





- Physical Exam


General Appearance: mild distress


Head Injury: no evidence of injury


Eye Exam: PERRL/EOMI


ENT Exam: airway nml


Neck Exam: supple


Respiratory/Chest Exam: rhonchi


Cardiovascular Exam: normal heart sounds


Gastrointestinal Exam: soft


Genitalia Exam: other (defer)


Rectal Exam: deferred


Back Exam: vertebral tenderness, decreased range of motion


Extremity Exam: normal inspection, other (lower ext edema)


Neurologic Exam: alert, oriented x 3, cooperative


Skin Exam: normal color


**SpO2 Interpretation**: normal


SpO2: 98


O2 Delivery: Room Air





<DONALDO HILLIARD - Last Filed: 01/02/20 09:56>





- Nursing Vital Signs


Nursing Vital Signs: 


 Initial Vital Signs











Temperature  97.4 F   01/02/20 06:21


 


Pulse Rate  86   01/02/20 06:21


 


Respiratory Rate  16   01/02/20 06:21


 


Blood Pressure  165/79   01/02/20 06:21


 


O2 Sat by Pulse Oximetry  98   01/02/20 06:21








 Pain Scale











Pain Intensity                 4

















- Radiology Exams


  ** Chest


X-ray Interpretation: Other (new right infrahilar infiltrate.  Stable 

atelectasis/scarring and evidence for old granulomatous disease.)





  ** Right Hip


X-ray Interpretation: Other (per radiologist interpretation: Right hip 

demonstrates mild osteopenia, lower lumbar degenerative spondylosis, and 

scattered vascular calcifications.  No other bony, articular or soft tissue 

abnormalities.)





- CT Exams


  ** Head


CT Interpretation: Other (stable nonacute senile brain with old right parietal 

infarct with no acute intracranial hemorrhage, hydrocephalus or mass effect.  

Fourth ventricle is midline.  Bony calvarium intact.  Visualized paranasal 

sinuses and mastoid air cells are clear.)





  ** Cervical Spine


CT Interpretation: Other (negative to fracture/subluxation.  Incidental 

multilevel bilateral degenerative facet hypertrophy and benign right thyroid 

chunky calcification.)





  ** Lumbar Spine


CT Interpretation: Other (nnew left L1 and L2 transverse process fractures.  

Incidental arteriosclerotic calcification and bilateral renal cysts.)





<VARLAS,PANTELHS SAMSON - Last Filed: 01/02/20 13:09>


Ordered Tests: 


 Active Orders 24 hr











 Category Date Time Status


 


 EKG-ER Only STAT Care  01/02/20 09:10 Active


 


 CERVICAL SPINE WO CONTRAST [CT] Stat Exams  01/02/20 07:33 Completed


 


 CHEST 1 VIEW (PORTABLE) Stat Exams  01/02/20 07:33 Completed


 


 HEAD WITHOUT CONTRAST [CT] Stat Exams  01/02/20 11:15 Completed


 


 HIP UNI (2V) INCL PEL IF DONE Stat Exams  01/02/20 08:24 Completed


 


 LUMBAR SPINE W/O [CT] Stat Exams  01/02/20 07:33 Completed


 


 BLOOD CULTURE Stat Lab  01/02/20 09:15 Received


 


 BNP [NT PRO BNP] Stat Lab  01/02/20 09:09 Completed


 


 CBC W DIFF Stat Lab  01/02/20 09:15 Completed


 


 CMP Stat Lab  01/02/20 09:15 Completed


 


 Lactic Acid Stat Lab  01/02/20 09:20 Completed


 


 PROTIME WITH INR Stat Lab  01/02/20 09:15 Completed


 


 Transfer Order Routine Transfer  01/02/20 Ordered








Medication Summary











Generic Name Dose Route Start Last Admin





  Trade Name Freq  PRN Reason Stop Dose Admin


 


Cefepime HCl 1 g/ Sodium  100 mls @ 200 mls/hr  01/02/20 12:45  





  Chloride  IV  01/02/20 13:14  





  STAT STA   





     





     





     





     











Lab/Rad Data: 


 Laboratory Result Diagrams





 01/02/20 09:15 





 01/02/20 09:15 





 Laboratory Results











  01/02/20 01/02/20 01/02/20 Range/Units





  09:40 09:20 09:15 


 


WBC     (4.0-10.5)  K/mm3


 


RBC     (4.1-5.4)  M/mm3


 


Hgb     (12.0-16.0)  gm/dl


 


Hct     (35-47)  %


 


MCV     ()  fl


 


MCH     (26-32)  pg


 


MCHC     (32-36)  g/dl


 


RDW     (11.5-14.0)  %


 


Plt Count     (150-450)  K/mm3


 


MPV     (6-9.5)  fl


 


Gran %     (36.0-66.0)  %


 


Eos # (Auto)     (0-0.5)  


 


Absolute Lymphs (auto)     (1.0-4.6)  


 


Absolute Monos (auto)     (0.0-1.3)  


 


Lymphocytes %     (24.0-44.0)  %


 


Monocytes %     (0.0-12.0)  %


 


Eosinophils %     (0.00-5.0)  %


 


Basophils %     (0.0-0.4)  %


 


Absolute Granulocytes     (1.4-6.9)  


 


Basophils #     (0-0.4)  


 


PT    23.5 H  (9.95-12.35)  SECONDS


 


INR    2.05  (0.8-3.0)  


 


Sodium     (137-145)  mmol/L


 


Potassium     (3.5-5.1)  mmol/L


 


Chloride     ()  mmol/L


 


Carbon Dioxide     (22-30)  mmol/L


 


Anion Gap     (5-15)  MEQ/L


 


BUN     (7-17)  mg/dL


 


Creatinine     (0.52-1.04)  mg/dL


 


Estimated GFR     ML/MIN


 


Glucose     ()  mg/dL


 


Lactic Acid   1.0   (0.4-2.0)  


 


Calcium     (8.4-10.2)  mg/dL


 


Total Bilirubin     (0.2-1.3)  mg/dL


 


AST     (14-36)  U/L


 


ALT     (0-35)  U/L


 


Alkaline Phosphatase     ()  U/L


 


NT-Pro-B Natriuret Pep     (0-900)  pg/mL


 


Serum Total Protein     (6.3-8.2)  g/dL


 


Albumin     (3.5-5.0)  g/dL


 


Influenza Type A Ag  NEGATIVE    (NEGATIVE)  


 


Influenza Type B Ag  NEGATIVE    (NEGATIVE)  


 


RSV (PCR)  NEGATIVE    (Negative)  














  01/02/20 01/02/20 01/02/20 Range/Units





  09:15 09:15 09:09 


 


WBC   11.8 H   (4.0-10.5)  K/mm3


 


RBC   3.78 L   (4.1-5.4)  M/mm3


 


Hgb   11.3 L   (12.0-16.0)  gm/dl


 


Hct   36.0   (35-47)  %


 


MCV   95.2   ()  fl


 


MCH   29.9   (26-32)  pg


 


MCHC   31.4 L   (32-36)  g/dl


 


RDW   15.9 H   (11.5-14.0)  %


 


Plt Count   224   (150-450)  K/mm3


 


MPV   11.1 H   (6-9.5)  fl


 


Gran %   76.3 H   (36.0-66.0)  %


 


Eos # (Auto)   0.32   (0-0.5)  


 


Absolute Lymphs (auto)   1.87   (1.0-4.6)  


 


Absolute Monos (auto)   0.59   (0.0-1.3)  


 


Lymphocytes %   15.8 L   (24.0-44.0)  %


 


Monocytes %   5.0   (0.0-12.0)  %


 


Eosinophils %   2.7   (0.00-5.0)  %


 


Basophils %   0.2   (0.0-0.4)  %


 


Absolute Granulocytes   9.02 H   (1.4-6.9)  


 


Basophils #   0.02   (0-0.4)  


 


PT     (9.95-12.35)  SECONDS


 


INR     (0.8-3.0)  


 


Sodium  140    (137-145)  mmol/L


 


Potassium  3.5    (3.5-5.1)  mmol/L


 


Chloride  102    ()  mmol/L


 


Carbon Dioxide  30    (22-30)  mmol/L


 


Anion Gap  12.0    (5-15)  MEQ/L


 


BUN  44 H    (7-17)  mg/dL


 


Creatinine  1.67 H    (0.52-1.04)  mg/dL


 


Estimated GFR  33.4    ML/MIN


 


Glucose  127 H    ()  mg/dL


 


Lactic Acid     (0.4-2.0)  


 


Calcium  9.9    (8.4-10.2)  mg/dL


 


Total Bilirubin  0.70    (0.2-1.3)  mg/dL


 


AST  20    (14-36)  U/L


 


ALT  12    (0-35)  U/L


 


Alkaline Phosphatase  116    ()  U/L


 


NT-Pro-B Natriuret Pep    894  (0-900)  pg/mL


 


Serum Total Protein  7.4    (6.3-8.2)  g/dL


 


Albumin  3.9    (3.5-5.0)  g/dL


 


Influenza Type A Ag     (NEGATIVE)  


 


Influenza Type B Ag     (NEGATIVE)  


 


RSV (PCR)     (Negative)  














<DONALDO HILLIARD - Last Filed: 01/02/20 09:56>





- Progress


Discussed with Dr.: Triston (@12:40, discuss the patient with Dr Boswell, 

Hospitalist.  Dr Boswell accepted the patient for observation to [facility] telemetry)





<TAY CACERES - Last Filed: 01/02/20 13:09>





- Progress


Progress Note: 


turn over to Dr. Caceres


01/02/20 10:05 (DONALDO HILLIARD)








01/02/20 11:10


spoke with Dr Perez, emergency department attending at [facility].  Dr Perez did not accept the patient for transfer for the transverse 

process fractures as the patient will not have any other intervention besides 

pain control








 (TAY CACERES)





<DONALDO HILLIARD - Last Filed: 01/02/20 09:56>





- Departure


Departure Disposition: Observation (to [facility] Telemetry to Dr Boswell)


Critical Care Time: No





<TAY CACERES - Last Filed: 01/02/20 13:09>





- Departure


Clinical Impression: 


 Multiple transverse process fractures, Essential hypertension





Right middle lobe pneumonia


Qualifiers:


 Pneumonia type: due to unspecified organism Qualified Code(s): J18.9 - 

Pneumonia, unspecified organism





Chronic kidney disease (CKD)


Qualifiers:


 Chronic kidney disease stage: stage 3 (moderate) Qualified Code(s): N18.3 - 

Chronic kidney disease, stage 3 (moderate)





Condition: Fair


Referrals: 


MARLEEN RIVAS MD [Primary Care Provider] -

## 2020-01-02 NOTE — XRAY
Indication: Pain following fall.



Comparison: November 3, 2019.



Portable chest demonstrates new patchy right infrahilar infiltrate.  Stable

bilateral mid to lower lung subsegmental atelectasis/scarring and a few tiny

calcified granulomas.  Heart is not enlarged.  Bony thorax intact again with

mild osteopenia and degenerative changes.



Impression: New right infrahilar infiltrate.  Stable atelectasis/scarring and

evidence for old granulomatous disease.

## 2020-01-02 NOTE — XRAY
Indication: Pain following fall.



Multiple contiguous axial images obtained through the lumbar spine.  Sagittal

and coronal reformatted images obtained.



Comparison: None.  There is lumbar radiograph May 22, 2019.



Axial images demonstrates nondisplaced left L1 and minimally displaced left L2

transverse process fractures.  Mild L4-S1 bilateral degenerative facet

hypertrophy.  Sagittal and coronal reformatted images demonstrates normal

alignment.  Vertebral body heights/disc spaces maintained.  No acute

compression fracture or subluxation.



Visualized noncontrasted soft tissues demonstrates moderate scattered

aortoiliac calcifications.  Incompletely visualized bilateral renal cysts.



Impression:

1.  New left L1 and L2 transverse process fractures.

2.  Incidental scattered arteriosclerotic calcifications and bilateral renal

cysts.

## 2020-01-02 NOTE — XRAY
Indication: Pain following fall.



Multiple contiguous axial images obtained through the cervical spine.

Sagittal and coronal reformatted images obtained.



Comparison: None.  There is cervical radiograph January 11, 2019.



Axial images negative for acute fracture, suspicious bony lesions, or spinal

canal stenosis.  Mild multilevel bilateral degenerative facet hypertrophy.

Sagittal and coronal reformatted images demonstrates normal alignment.

Vertebral body heights/disc spaces maintained.  No acute compression fracture,

subluxation, or jumped facet.  Normal-appearing craniocervical junction.



Visualized noncontrasted soft tissues demonstrates mild carotid calcifications

bilaterally.  Small right thyroid benign chunky calcification.  Base of the

brain and lung apices are unremarkable.



Impression:

1.  Again negative acute fracture/subluxation.

2.  Incidental multilevel bilateral degenerative facet hypertrophy and benign

right thyroid chunky calcification.

## 2020-01-02 NOTE — XRAY
Indication: Pain following fall.



Multiple contiguous axial images obtained through the head without contrast.



Comparison: November 1, 2019.



Stable global atrophy, moderate periventricular degenerative micro-ischemia

bilaterally, and high right parietal lobe infarct.  Again no acute

intracranial hemorrhage, hydrocephalus, or mass effect.  Fourth ventricle is

midline.  Bony calvarium intact.  Visualized paranasal sinuses and mastoid air

cells are clear.



Impression: Stable nonacute senile brain with old right parietal infarct.

## 2020-01-02 NOTE — XRAY
Indication: Pain following fall.



Comparison: None



AP pelvis and 2 views of the right hip demonstrates mild osteopenia, lower

lumbar degenerative spondylosis, and scattered vascular calcifications.  No

other bony, articular, or soft tissue abnormalities.

## 2020-01-03 VITALS — HEART RATE: 76 BPM | OXYGEN SATURATION: 98 % | SYSTOLIC BLOOD PRESSURE: 157 MMHG | DIASTOLIC BLOOD PRESSURE: 70 MMHG

## 2020-01-03 LAB
ANION GAP SERPL CALC-SCNC: 10.1 MEQ/L (ref 5–15)
BASOPHILS # BLD AUTO: 0.02 10*3/UL (ref 0–0.4)
BASOPHILS NFR BLD AUTO: 0.2 % (ref 0–0.4)
BUN SERPL-MCNC: 34 MG/DL (ref 7–17)
CALCIUM SPEC-MCNC: 9.2 MG/DL (ref 8.4–10.2)
CHLORIDE SERPL-SCNC: 108 MMOL/L (ref 98–107)
CO2 SERPL-SCNC: 25 MMOL/L (ref 22–30)
CREAT SERPL-MCNC: 1.51 MG/DL (ref 0.52–1.04)
EOSINOPHIL # BLD AUTO: 0.31 10*3/UL (ref 0–0.5)
GLUCOSE SERPL-MCNC: 122 MG/DL (ref 74–106)
HCT VFR BLD AUTO: 36 % (ref 35–47)
HGB BLD-MCNC: 11 GM/DL (ref 12–16)
INR PPP: 1.69 (ref 0.8–3)
LYMPHOCYTES # SPEC AUTO: 2.04 10*3/UL (ref 1–4.6)
MCH RBC QN AUTO: 29.3 PG (ref 26–32)
MCHC RBC AUTO-ENTMCNC: 30.6 G/DL (ref 32–36)
MONOCYTES # BLD AUTO: 0.55 10*3/UL (ref 0–1.3)
PLATELET # BLD AUTO: 190 K/MM3 (ref 150–450)
POTASSIUM SERPLBLD-SCNC: 3.8 MMOL/L (ref 3.5–5.1)
PROTHROMBIN TIME: 19.3 SECONDS (ref 9.95–12.35)
RBC # BLD AUTO: 3.76 M/MM3 (ref 4.1–5.4)
SODIUM SERPL-SCNC: 139 MMOL/L (ref 137–145)
WBC # BLD AUTO: 9.5 K/MM3 (ref 4–10.5)

## 2020-01-03 RX ADMIN — GABAPENTIN SCH MG: 400 CAPSULE ORAL at 11:42

## 2020-01-03 RX ADMIN — CYCLOBENZAPRINE HYDROCHLORIDE SCH MG: 10 TABLET, FILM COATED ORAL at 11:40

## 2020-01-03 RX ADMIN — MORPHINE SULFATE PRN MG: 4 INJECTION, SOLUTION INTRAMUSCULAR; INTRAVENOUS at 03:36

## 2020-01-03 RX ADMIN — FAMOTIDINE SCH MG: 20 TABLET, FILM COATED ORAL at 11:40

## 2020-01-03 RX ADMIN — ACETAMINOPHEN PRN MG: 325 TABLET ORAL at 01:03

## 2020-01-03 RX ADMIN — CARVEDILOL SCH MG: 3.12 TABLET, FILM COATED ORAL at 11:41

## 2020-01-03 RX ADMIN — PRAMIPEXOLE DIHYDROCHLORIDE SCH MG: 0.5 TABLET ORAL at 11:40

## 2020-01-03 RX ADMIN — MORPHINE SULFATE PRN MG: 4 INJECTION, SOLUTION INTRAMUSCULAR; INTRAVENOUS at 07:50

## 2020-01-03 NOTE — XRAY
Indication: Follow-up pneumonia.



Comparison: One day earlier.



Portable chest demonstrates mild clearing of the previous right infrahilar

infiltrate with now mild subsegmental atelectasis/scarring.  Stable left lung

subsegmental atelectasis/scarring and scattered calcified granulomas.  Heart

is not enlarged.  No new/acute findings.

## 2020-01-30 NOTE — PCM.SSS
History of Present Illness





- Chief Complaint


Chief Complaint: R intrahilar pneumonia, L1-L2 trnasverse process fractures


Date: 01/03/20


History of Present Illness: 


 is a 59 year old female, presented to ER last evening after sleep 

walking and falling injuring her back.  Pt. placed in observation for the night

,  pt denies any paresthesia or changes in bowel or bladder.








- Review of Systems


Constitutional: No Fever, No Chills


Eyes: No Symptoms


Ears, Nose, & Throat: No Symptoms


Respiratory: No Cough, No Short Of Breath


Cardiac: No Chest Pain, No Edema, No Syncope


Abdominal/Gastrointestinal: No Abdominal Pain, No Nausea, No Vomiting, No 

Diarrhea


Genitourinary Symptoms: No Dysuria


Musculoskeletal: Back Pain


Skin: No Rash


Neurological: No Dizziness, No Focal Weakness, No Sensory Changes





Medications & Allergies


Home Medications: 


 Home Medication List





Bumetanide 2 mg PO DAILY 11/01/19 [History Confirmed 01/02/20]


Carvedilol 3.125 mg*** [Coreg 3.125 MG***] 3.125 mg PO BID 11/01/19 [History 

Confirmed 01/02/20]


Cholecalciferol (Vitamin D3) [Vitamin D3] 1,000 unit PO DAILY 11/01/19 [History 

Confirmed 01/02/20]


Gabapentin 400 mg PO TID 11/01/19 [History Confirmed 01/02/20]


Isosorbide Mononitrate [Isosorbide Mononitrate ER] 30 mg PO DAILY 11/01/19 [

History Confirmed 01/02/20]


Nitroglycerin 0.4 mg Tablet*** [Nitrostat 0.4 MG Tablet***] 0.4 mg SL Q5MIN PRN 

MR X 3 PRN 11/01/19 [History Confirmed 01/02/20]


PANTOPRAZOLE 40 mg Tablet*** [Protonix 40MG Tablet***] 40 mg PO QAM 11/01/19 [

History Confirmed 01/02/20]


Potassium Chloride 10 Meq Tab* [Klor Con 10 MEQ***] 10 meq PO DAILY 11/01/19 [

History Confirmed 01/02/20]


Pramipexole Di-HCl [Mirapex] 1 mg PO BID 11/01/19 [History Confirmed 01/02/20]


Rosuvastatin Calcium 20 mg PO HS 11/01/19 [History Confirmed 01/02/20]


Warfarin Sodium 5 mg*** [Coumadin 5 MG***] 5 mg PO DAILY 11/01/19 [History 

Confirmed 01/02/20]


calcitrioL [Calcitriol] 0.25 mcg PO DAILY 11/01/19 [History Confirmed 01/02/20]


metOLazone [Metolazone] 2.5 mg PO UD 11/01/19 [History Confirmed 01/02/20]


Allopurinol 100 mg*** [Zyloprim 100 mg***] 100 mg PO DAILY 01/02/20 [History 

Confirmed 01/02/20]


Cyclobenzaprine HCl 10 mg*** [Cyclobenzaprine 10 MG***] 10 mg PO TID 01/02/20 [

History Confirmed 01/02/20]


buPROPion HCl [Bupropion HCl Sr] 200 mg PO BID 01/02/20 [History Confirmed 01/02 /20]


Hydrocodone Bit/Acetaminophen [Lortab 7.5-500 Tablet] 1 each PO QID 7 Days #28 

tablet 01/03/20 [Rx]








Allergies/Adverse Reactions: 


 Allergies











Allergy/AdvReac Type Severity Reaction Status Date / Time


 


amoxicillin Allergy   Verified 01/02/20 06:37


 


oxytetracycline Allergy   Verified 01/02/20 06:37





[From Terramycin]     


 


oxytetracycline HCl Allergy   Verified 01/02/20 06:37





[From Terramycin]     


 


Penicillins Allergy   Verified 01/02/20 06:37














- Past Medical History


Past Medical History: Yes


Neurological History: Stroke, TIA


ENT History: No Pertinent History


Cardiac History: Aneurysm, Congestive Heart Failure, Hypertension, Myocardial 

Infarction (MI)


Respiratory History: CHF, Pneumonia


Endocrine Medical History: No Pertinent History


Musculoskelatal History: Osteoporosis


GI Medical History: GERD


 History: No Pertinent History


Pyscho-Social History: Anxiety, Depression


Reproductive Disorders: Endometriosis


Comment: LUPUS ANTICOAGULANT DISORDER, MORBID OBESITY, GOUT, ANXIETY, DEPRESSION

, VENTRICULAR ARRHYTHMIA, CKD STAGE 4, OSTEOPOROSIS, HYPERGLYCEMIA, FRACTURE 

PATELLA 2016





- Female History


Hx Last Menstrual Period: years ago


Are you pregnant now?: No





- Past Surgical History


Past Surgical History: Yes


Neuro Surgical History: No Pertinent History


Cardiac History: Cardiac Stent


Respiratory Surgery: No Pertinent History


GI Surgical History: No Pertinent History


Genitourinary Surgical Hx: No Pertinent History


Musculskeletal Surgical Hx: No Pertinent History


Female Surgical History: Hysterectomy


Other Surgical History: CARPAL TUNNEL-RIGHT WRIST, RIGHT FOOT





- Social History


Smoking Status: Current every day smoker


How long have you smoked: years


Exposure to second hand smoke: No


Alcohol: None


Drug Use: marijuana





- Physical Exam


General Appearance: no apparent distress, alert


Neurologic Exam: alert, cooperative


Eye Exam: eyes nml inspection


Ears, Nose, Throat Exam: normal ENT inspection


Neck Exam: normal inspection, non-tender, supple, full range of motion


Respiratory Exam: normal breath sounds, lungs clear, No respiratory distress


Cardiovascular Exam: regular rate/rhythm, normal heart sounds, normal 

peripheral pulses


Gastrointestinal/Abdomen Exam: soft, normal bowel sounds, No tenderness, No 

distention, No mass, No guarding


Pelvic Exam: not done


Rectal Exam: deferred


Back Exam: vertebral tenderness


Extremity Exam: normal range of motion, pelvis stable, No amputations


Skin Exam: normal color, warm, dry


Lymphatic Exam: No adenopathy





Results





- Labs


Lab/Micro Results: 


 Microbiology











 01/02/20 09:27 Blood Culture Gram Stain - Final





 Blood    Not Reportable





 Blood Culture - Final





    NO GROWTH


 


 01/02/20 09:15 Blood Culture Gram Stain - Final





 Blood    Not Reportable





 Blood Culture - Final





    NO GROWTH














Assessment/Plan


(1) Multiple transverse process fractures


Status: Acute   


Assessment & Plan: 


Pain control measures


Code(s): QAC5632 -    





(2) Right middle lobe pneumonia


Status: Acute   


Qualifiers: 


   Pneumonia type: due to unspecified organism   Qualified Code(s): J18.9 - 

Pneumonia, unspecified organism   


Assessment & Plan: 


IV antibiotics


Code(s): J18.9 - PNEUMONIA, UNSPECIFIED ORGANISM   





Hospital Summary





- Hospital Course


Hospital Course: 


Pt. admitted and pain controlled with medications, able to ambulate this 

morning.  No symptoms now or prior regarding the found pneumonia, at this time 

it was felt no further benefit from hospitalization








- Vitals & Intake/Output


Vital Signs: 


 Vital Signs











Temperature  97.7 F   01/03/20 08:00


 


Pulse Rate  76   01/03/20 08:00


 


Respiratory Rate  19   01/03/20 08:00


 


Blood Pressure  157/70   01/03/20 08:00


 


O2 Sat by Pulse Oximetry  98   01/03/20 08:00














- Lab


Result Diagrams: 


 01/03/20 04:30





 01/03/20 04:30


Micro Results-Entire Visit: 


 Microbiology











 01/02/20 09:27 Blood Culture Gram Stain - Final





 Blood    Not Reportable





 Blood Culture - Final





    NO GROWTH


 


 01/02/20 09:15 Blood Culture Gram Stain - Final





 Blood    Not Reportable





 Blood Culture - Final





    NO GROWTH














- Procedures and Test


Procedures and Tests throughout Hospitalization: 


 Therapy Orders & Screens





01/02/20 14:07


EKG 


   Comment: 





01/02/20 16:03


OT Screen per Nursing Assess 


   Comment: Protocol Order


   Physician Instructions: Greater than 3 points order OT Admission Screening


   Reason For Exam: Triggered on Admission


   Diagnosis: R intrahilar pneumonia, L1-L2 trnasverse process fractures


   Open Wound/Cellutlitis/Pressure Ulcers: No


   Acute Fx/ORIF/Change in wt bearing status: No


   Severe MUSCULOSKELETAL pain: Yes


   ADL Dysfunction: No


   Acute CVA w/Hemiparesis/Hemiplegia: No


   Decreased Functional Mobility/Strength: Yes


   Sprain/Strain: No


   Acute Post-op Mobility Dysfunction: No


   Total Points: 6


PT Screen per Nursing Assess 


   Comment: Protocol Order


   Physician Instructions: Greater than 3 points order PT Admission Screenin


   Reason For Exam: Triggered on Admission


   Diagnosis: R intrahilar pneumonia, L1-L2 trnasverse process fractures


   Open Wound/Cellutlitis/Pressure Ulcers: No


   Acute Fx/ORIF/Change in wt bearing status: No


   Severe MUSCULOSKELETAL pain: Yes


   ADL Dysfunction: No


   Acute CVA w/Hemiparesis/Hemiplegia: No


   Decreased Functional Mobility/Strength: Yes


   Sprain/Strain: No


   Acute Post-op Mobility Dysfunction: No


   Total Points: 6


RT Screen per Nursing Assess ONCE 


   Comment: Protocol Order


   Physician Instructions: Greater than 3 points order RT Admission Screen


   Reason For Exam: Triggered on Admission


   Diagnosis: R intrahilar pneumonia, L1-L2 trnasverse process fractures


   Diagnosis: R intrahilar pneumonia, L1-L2 trnasverse process fractures


   Pneumonia: Yes


   Home O2: No


   Asthma: No


   CHF: Yes


   Home CPAP/BIPAP: Yes


   Home Nebs/MDI: No


   Total Points: 11


Smoking Cessation Education ONCE 


   Comment: 


   Diagnosis: R intrahilar pneumonia, L1-L2 trnasverse process fractures


   Smoking Status: Current every day smoker


   How long have you smoked: years


   Have you smoked in the past 12 months: Yes


   Approximately how many cigarettes per day: 3-4


   Do you dip or chew tobacco: No


   If,Former Smoker,when did you quit: 1.5 years














- Discharge


Discharge Date: 01/03/20


Disposition: Home, Self-Care


Condition: Fair


Prescriptions: 


New


   Hydrocodone Bit/Acetaminophen [Lortab 7.5-500 Tablet] 1 each PO QID 7 Days #

28 tablet





Continue


   Bumetanide 2 mg PO DAILY


   calcitrioL [Calcitriol] 0.25 mcg PO DAILY


   Warfarin Sodium 5 mg*** [Coumadin 5 MG***] 5 mg PO DAILY


   Cholecalciferol (Vitamin D3) [Vitamin D3] 1,000 unit PO DAILY


   Isosorbide Mononitrate [Isosorbide Mononitrate ER] 30 mg PO DAILY


   Rosuvastatin Calcium 20 mg PO HS


   Pramipexole Di-HCl [Mirapex] 1 mg PO BID


   Potassium Chloride 10 Meq Tab* [Klor Con 10 MEQ***] 10 meq PO DAILY


   PANTOPRAZOLE 40 mg Tablet*** [Protonix 40MG Tablet***] 40 mg PO QAM


   metOLazone [Metolazone] 2.5 mg PO UD


   Gabapentin 400 mg PO TID


   Carvedilol 3.125 mg*** [Coreg 3.125 MG***] 3.125 mg PO BID


   Nitroglycerin 0.4 mg Tablet*** [Nitrostat 0.4 MG Tablet***] 0.4 mg SL Q5MIN 

PRN MR X 3 PRN


     PRN Reason: angina


   buPROPion HCl [Bupropion HCl Sr] 200 mg PO BID


   Cyclobenzaprine HCl 10 mg*** [Cyclobenzaprine 10 MG***] 10 mg PO TID


   Allopurinol 100 mg*** [Zyloprim 100 mg***] 100 mg PO DAILY


Instructions:  Pneumonia in Adults, Fracture (DC), Low Back Pain  (DC)


Follow up with: 


MARLEEN RIVAS MD [Primary Care Provider] - 01/09/20 9:30 am


Forms:  Discharge Instructions

## 2020-02-24 ENCOUNTER — HOSPITAL ENCOUNTER (EMERGENCY)
Dept: HOSPITAL 33 - ED | Age: 60
LOS: 1 days | Discharge: ANOTHER HOSPITAL | End: 2020-02-25
Payer: MEDICARE

## 2020-02-24 DIAGNOSIS — I21.4: Primary | ICD-10-CM

## 2020-02-24 DIAGNOSIS — I50.9: ICD-10-CM

## 2020-02-24 DIAGNOSIS — R74.8: ICD-10-CM

## 2020-02-24 DIAGNOSIS — Z86.73: ICD-10-CM

## 2020-02-24 DIAGNOSIS — Z79.01: ICD-10-CM

## 2020-02-24 DIAGNOSIS — N18.4: ICD-10-CM

## 2020-02-24 DIAGNOSIS — I12.9: ICD-10-CM

## 2020-02-24 DIAGNOSIS — M10.9: ICD-10-CM

## 2020-02-24 DIAGNOSIS — R79.89: ICD-10-CM

## 2020-02-24 LAB
ALBUMIN SERPL-MCNC: 3.8 G/DL (ref 3.5–5)
ALP SERPL-CCNC: 143 U/L (ref 38–126)
ALT SERPL-CCNC: 14 U/L (ref 0–35)
ANION GAP SERPL CALC-SCNC: 12 MEQ/L (ref 5–15)
AST SERPL QL: 21 U/L (ref 14–36)
BASOPHILS # BLD AUTO: 0.04 10*3/UL (ref 0–0.4)
BASOPHILS NFR BLD AUTO: 0.4 % (ref 0–0.4)
BILIRUB BLD-MCNC: 0.6 MG/DL (ref 0.2–1.3)
BUN SERPL-MCNC: 31 MG/DL (ref 7–17)
CALCIUM SPEC-MCNC: 9.4 MG/DL (ref 8.4–10.2)
CHLORIDE SERPL-SCNC: 106 MMOL/L (ref 98–107)
CO2 SERPL-SCNC: 24 MMOL/L (ref 22–30)
CREAT SERPL-MCNC: 1.98 MG/DL (ref 0.52–1.04)
EOSINOPHIL # BLD AUTO: 0.4 10*3/UL (ref 0–0.5)
GLUCOSE SERPL-MCNC: 157 MG/DL (ref 74–106)
HCT VFR BLD AUTO: 37 % (ref 35–47)
HGB BLD-MCNC: 11.8 GM/DL (ref 12–16)
LYMPHOCYTES # SPEC AUTO: 2.3 10*3/UL (ref 1–4.6)
MCH RBC QN AUTO: 29.8 PG (ref 26–32)
MCHC RBC AUTO-ENTMCNC: 31.9 G/DL (ref 32–36)
MONOCYTES # BLD AUTO: 0.61 10*3/UL (ref 0–1.3)
PLATELET # BLD AUTO: 139 K/MM3 (ref 150–450)
POTASSIUM SERPLBLD-SCNC: 3.7 MMOL/L (ref 3.5–5.1)
PROT SERPL-MCNC: 7.2 G/DL (ref 6.3–8.2)
RBC # BLD AUTO: 3.96 M/MM3 (ref 4.1–5.4)
SODIUM SERPL-SCNC: 138 MMOL/L (ref 137–145)
WBC # BLD AUTO: 10.3 K/MM3 (ref 4–10.5)

## 2020-02-24 PROCEDURE — 99285 EMERGENCY DEPT VISIT HI MDM: CPT

## 2020-02-24 PROCEDURE — 96374 THER/PROPH/DIAG INJ IV PUSH: CPT

## 2020-02-24 PROCEDURE — 96375 TX/PRO/DX INJ NEW DRUG ADDON: CPT

## 2020-02-24 PROCEDURE — 85025 COMPLETE CBC W/AUTO DIFF WBC: CPT

## 2020-02-24 PROCEDURE — 36415 COLL VENOUS BLD VENIPUNCTURE: CPT

## 2020-02-24 PROCEDURE — 94760 N-INVAS EAR/PLS OXIMETRY 1: CPT

## 2020-02-24 PROCEDURE — 80053 COMPREHEN METABOLIC PANEL: CPT

## 2020-02-24 PROCEDURE — 36000 PLACE NEEDLE IN VEIN: CPT

## 2020-02-24 PROCEDURE — 85610 PROTHROMBIN TIME: CPT

## 2020-02-24 PROCEDURE — 93041 RHYTHM ECG TRACING: CPT

## 2020-02-24 PROCEDURE — 85730 THROMBOPLASTIN TIME PARTIAL: CPT

## 2020-02-24 PROCEDURE — 85379 FIBRIN DEGRADATION QUANT: CPT

## 2020-02-24 PROCEDURE — 71045 X-RAY EXAM CHEST 1 VIEW: CPT

## 2020-02-24 PROCEDURE — 84484 ASSAY OF TROPONIN QUANT: CPT

## 2020-02-24 PROCEDURE — 99291 CRITICAL CARE FIRST HOUR: CPT

## 2020-02-24 PROCEDURE — 99292 CRITICAL CARE ADDL 30 MIN: CPT

## 2020-02-24 RX ADMIN — MORPHINE SULFATE ONE MG: 4 INJECTION, SOLUTION INTRAMUSCULAR; INTRAVENOUS at 23:06

## 2020-02-24 NOTE — ERPHSYRPT
- History of Present Illness


Time Seen by Provider: 02/24/20 22:20


Historian: patient


Exam Limitations: no limitations


Patient Subjective Stated Complaint: Patient is a 59-year-old female presents 

to our ED with complaints of chest pain, burning sensation in her chest.  

Patient has had similar symptoms in the past and was diagnosed with pneumonia.


Physician History: 





Patient is a 59-year-old female presents to our ED with complaints of 

substernal chest pain.  Pain worse with deep breathing.  Patient believes that 

she has pneumonia.  Symptoms are mild to moderate intensity.  Exertion does not 

worsen her symptoms.  No diaphoresis.  No trauma.  No fever.  Cough is dry 

nonproductive.  Patient voices no other complaints at this time.


Timing/Duration: yesterday, improved


Activities at Onset: none


Quality: aching


Location: substernal


Chest Pain Radiation: no radiation


Severity of Pain-Max: moderate


Severity of Pain-Current: moderate


Modifying Factors: Improves With: nothing


Associated Symptoms: denies symptoms, cough, No nausea, No palpitations, No 

shortness of breath


Aspirin Treatment Today: 325 mg x 1


Allergies/Adverse Reactions: 








amoxicillin Allergy (Verified 02/24/20 22:31)


 


oxytetracycline [From Terramycin] Allergy (Verified 02/24/20 22:31)


 


oxytetracycline HCl [From Terramycin] Allergy (Verified 02/24/20 22:31)


 


Penicillins Allergy (Verified 02/24/20 22:31)


 





Home Medications: 








Bumetanide 2 mg PO DAILY 11/01/19 [History]


Carvedilol 3.125 mg*** [Coreg 3.125 MG***] 3.125 mg PO BID 11/01/19 [History]


Cholecalciferol (Vitamin D3) [Vitamin D3] 1,000 unit PO DAILY 11/01/19 [History]


Gabapentin 400 mg PO TID 11/01/19 [History]


Isosorbide Mononitrate [Isosorbide Mononitrate ER] 30 mg PO DAILY 11/01/19 [

History]


Nitroglycerin 0.4 mg Tablet*** [Nitrostat 0.4 MG Tablet***] 0.4 mg SL Q5MIN PRN 

MR X 3 PRN 11/01/19 [History]


PANTOPRAZOLE 40 mg Tablet*** [Protonix 40MG Tablet***] 40 mg PO QAM 11/01/19 [

History]


Potassium Chloride 10 Meq Tab* [Klor Con 10 MEQ***] 10 meq PO DAILY 11/01/19 [

History]


Pramipexole Di-HCl [Mirapex] 1 mg PO BID 11/01/19 [History]


Rosuvastatin Calcium 20 mg PO HS 11/01/19 [History]


Warfarin Sodium 5 mg*** [Coumadin 5 MG***] 5 mg PO DAILY 11/01/19 [History]


calcitrioL [Calcitriol] 0.25 mcg PO DAILY 11/01/19 [History]


metOLazone [Metolazone] 2.5 mg PO UD 11/01/19 [History]


Allopurinol 100 mg*** [Zyloprim 100 mg***] 100 mg PO DAILY 01/02/20 [History]


Cyclobenzaprine HCl 10 mg*** [Cyclobenzaprine 10 MG***] 10 mg PO TID 01/02/20 [

History]


buPROPion HCl [Bupropion HCl Sr] 200 mg PO BID 01/02/20 [History]





Hx Tetanus, Diphtheria Vaccination/Date Given: No


Hx Influenza Vaccination/Date Given: No


Hx Pneumococcal Vaccination/Date Given: Yes





- Review of Systems


Constitutional: No Fever, No Chills


Eyes: No Symptoms


Ears, Nose, & Throat: No Symptoms


Respiratory: No Symptoms, No Cough, No Dyspnea


Cardiac: No Symptoms, Chest Pain, No Edema, No Syncope


Abdominal/Gastrointestinal: No Symptoms, No Abdominal Pain, No Nausea, No 

Vomiting, No Diarrhea


Genitourinary Symptoms: No Symptoms, No Dysuria


Musculoskeletal: No Symptoms, No Back Pain, No Neck Pain


Skin: No Symptoms, No Rash


Neurological: No Symptoms, No Dizziness, No Focal Weakness, No Sensory Changes


Psychological: No Symptoms


Endocrine: No Symptoms, Excessive Sweating


Hematologic/Lymphatic: No Symptoms


All Other Systems: Reviewed and Negative





- Past Medical History


Pertinent Past Medical History: Yes


Neurological History: Stroke, TIA


ENT History: No Pertinent History


Cardiac History: Aneurysm, Congestive Heart Failure, Hypertension, Myocardial 

Infarction (MI)


Respiratory History: CHF, Pneumonia


Endocrine Medical History: No Pertinent History


Musculoskeletal History: Osteoporosis


GI Medical History: GERD


 History: No Pertinent History


Psycho-Social History: Anxiety, Depression


Female Reproductive Disorders: Endometriosis


Other Medical History: LUPUS ANTICOAGULANT DISORDER, MORBID OBESITY, GOUT, 

ANXIETY, DEPRESSION, VENTRICULAR ARRHYTHMIA, CKD STAGE 4, OSTEOPOROSIS, 

HYPERGLYCEMIA, FRACTURE PATELLA 2016





- Past Surgical History


Past Surgical History: Yes


Neuro Surgical History: No Pertinent History


Cardiac: Cardiac Stent


Respiratory: No Pertinent History


Gastrointestinal: No Pertinent History


Genitourinary: No Pertinent History


Musculoskeletal: No Pertinent History


Female Surgical History: Hysterectomy


Other Surgical History: CARPAL TUNNEL-RIGHT WRIST, RIGHT FOOT





- Social History


Smoking Status: Current every day smoker


How long have you smoked: years


Exposure to second hand smoke: No


Drug Use: marijuana


Patient Lives Alone: Yes





- Nursing Vital Signs


Nursing Vital Signs: 


 Initial Vital Signs











Temperature  97.5 F   02/24/20 22:10


 


Pulse Rate  104 H  02/24/20 22:10


 


Respiratory Rate  26 H  02/24/20 22:10


 


Blood Pressure  130/78   02/24/20 22:10


 


O2 Sat by Pulse Oximetry  97   02/24/20 22:10








 Pain Scale











Pain Intensity                 8

















- Physical Exam


General Appearance: no apparent distress, alert


Eye Exam: PERRL/EOMI, eyes nml inspection


Ears, Nose, Throat Exam: normal ENT inspection, moist mucous membranes


Neck Exam: normal inspection, non-tender, supple, full range of motion


Respiratory Exam: normal breath sounds, lungs clear, No respiratory distress


Cardiovascular Exam: regular rate/rhythm, normal heart sounds


Gastrointestinal/Abdomen Exam: soft, No tenderness, No mass


Back Exam: normal inspection, No CVA tenderness, No vertebral tenderness


Extremity Exam: normal inspection, normal range of motion, other (Chronic lower 

extremity pitting edema.)


Neurologic Exam: alert, oriented x 3, cooperative, normal mood/affect, 

sensation nml, No motor deficits


Skin Exam: normal color, warm, dry


**SpO2 Interpretation**: normal


SpO2: 97


O2 Delivery: Room Air





- Course


EKG Interpreted by Me: Sinus Tach, NORMAL AXIS, NORMAL INTERVALS


Ordered Tests: 


 Active Orders 24 hr











 Category Date Time Status


 


 Cardiac Monitor STAT Care  02/24/20 22:24 Active


 


 IV Insertion STAT Care  02/24/20 22:23 Active


 


 Oxygen-ED Only Nasal Cannula 2 lpm Care  02/24/20 23:10 Active


 


 Pulse Oximetry (ED) STAT Care  02/24/20 22:23 Active


 


 CHEST 1 VIEW (PORTABLE) Stat Exams  02/24/20 22:23 Taken


 


 CBC W DIFF Stat Lab  02/24/20 22:37 Completed


 


 CMP Stat Lab  02/24/20 22:37 Completed


 


 D-DIMER QUANTITATIVE Stat Lab  02/24/20 22:37 Completed


 


 PROTIME WITH INR Stat Lab  02/25/20 00:07 Completed


 


 PTT Stat Lab  02/25/20 00:07 Completed


 


 TROPONIN Q3H Lab  02/24/20 22:37 Completed


 


 TROPONIN Q3H Lab  02/25/20 01:30 Ordered


 


 TROPONIN Q3H Lab  02/25/20 04:30 Ordered


 


 TROPONIN Q3H Lab  02/25/20 07:30 Ordered


 


 TROPONIN Q3H Lab  02/25/20 10:30 Ordered








Medication Summary











Generic Name Dose Route Start Last Admin





  Trade Name Freq  PRN Reason Stop Dose Admin


 


Heparin Sodium/Dextrose  25,000 units in 250 mls @ 13 mls/hr  02/25/20 01:00  





  Heparin 25,000 Units/D5w 250ml Premix  IV  03/26/20 00:59  





  .[id number] LUDY   





     





     





     





     














Discontinued Medications














Generic Name Dose Route Start Last Admin





  Trade Name Freq  PRN Reason Stop Dose Admin


 


Aspirin  324 mg  02/25/20 00:35  





  Baby Aspirin 81 Mg Chew***  PO  02/25/20 00:36  





  STAT ONE   





     





     





     





     


 


Heparin Sodium (Beef Lung)  5,000 unit  02/25/20 00:45  





  Heparin 5000 Units/0.5 Ml (High Risk Med)***  IV  02/25/20 00:46  





  STAT ONE   





     





     





     





     


 


Morphine Sulfate  4 mg  02/24/20 23:03  02/24/20 23:06





  Morphine Sulfate 4 Mg Inj***  IV  02/24/20 23:04  4 mg





  STAT ONE   Administration





     





     





     





     


 


Morphine Sulfate  Confirm  02/24/20 23:04  





  Morphine Sulfate 4 Mg Inj***  Administered  02/24/20 23:05  





  Dose   





  4 mg   





  .ROUTE   





  .STK-MED ONE   





     





     





     





     











Lab/Rad Data: 


 Laboratory Result Diagrams





 02/24/20 22:37 





 02/24/20 22:37 





 Laboratory Results











  02/25/20 02/24/20 02/24/20 Range/Units





  00:07 22:37 22:37 


 


WBC     (4.0-10.5)  K/mm3


 


RBC     (4.1-5.4)  M/mm3


 


Hgb     (12.0-16.0)  gm/dl


 


Hct     (35-47)  %


 


MCV     ()  fl


 


MCH     (26-32)  pg


 


MCHC     (32-36)  g/dl


 


RDW     (11.5-14.0)  %


 


Plt Count     (150-450)  K/mm3


 


MPV     (7.5-11.0)  fl


 


Gran %     (36.0-66.0)  %


 


Eos # (Auto)     (0-0.5)  


 


Absolute Lymphs (auto)     (1.0-4.6)  


 


Absolute Monos (auto)     (0.0-1.3)  


 


Lymphocytes %     (24.0-44.0)  %


 


Monocytes %     (0.0-12.0)  %


 


Eosinophils %     (0.00-5.0)  %


 


Basophils %     (0.0-0.4)  %


 


Absolute Granulocytes     (1.4-6.9)  


 


Basophils #     (0-0.4)  


 


PT  16.5 H    (9.95-12.35)  SECONDS


 


INR  1.45    (0.8-3.0)  


 


APTT  76.6 H    (25.3-37.0)  SECONDS


 


D-Dimer    689 H*  (215-500)  ng/mL


 


Sodium     (137-145)  mmol/L


 


Potassium     (3.5-5.1)  mmol/L


 


Chloride     ()  mmol/L


 


Carbon Dioxide     (22-30)  mmol/L


 


Anion Gap     (5-15)  MEQ/L


 


BUN     (7-17)  mg/dL


 


Creatinine     (0.52-1.04)  mg/dL


 


Estimated GFR     ML/MIN


 


Glucose     ()  mg/dL


 


Calcium     (8.4-10.2)  mg/dL


 


Total Bilirubin     (0.2-1.3)  mg/dL


 


AST     (14-36)  U/L


 


ALT     (0-35)  U/L


 


Alkaline Phosphatase     ()  U/L


 


Troponin I   0.158 H*   (0.000-0.034)  ng/mL


 


Serum Total Protein     (6.3-8.2)  g/dL


 


Albumin     (3.5-5.0)  g/dL














  02/24/20 02/24/20 Range/Units





  22:37 22:37 


 


WBC   10.3  (4.0-10.5)  K/mm3


 


RBC   3.96 L  (4.1-5.4)  M/mm3


 


Hgb   11.8 L  (12.0-16.0)  gm/dl


 


Hct   37.0  (35-47)  %


 


MCV   93.4  ()  fl


 


MCH   29.8  (26-32)  pg


 


MCHC   31.9 L  (32-36)  g/dl


 


RDW   15.5 H  (11.5-14.0)  %


 


Plt Count   139 L  (150-450)  K/mm3


 


MPV   10.9  (7.5-11.0)  fl


 


Gran %   67.5 H  (36.0-66.0)  %


 


Eos # (Auto)   0.40  (0-0.5)  


 


Absolute Lymphs (auto)   2.30  (1.0-4.6)  


 


Absolute Monos (auto)   0.61  (0.0-1.3)  


 


Lymphocytes %   22.3 L  (24.0-44.0)  %


 


Monocytes %   5.9  (0.0-12.0)  %


 


Eosinophils %   3.9  (0.00-5.0)  %


 


Basophils %   0.4  (0.0-0.4)  %


 


Absolute Granulocytes   6.96 H  (1.4-6.9)  


 


Basophils #   0.04  (0-0.4)  


 


PT    (9.95-12.35)  SECONDS


 


INR    (0.8-3.0)  


 


APTT    (25.3-37.0)  SECONDS


 


D-Dimer    (215-500)  ng/mL


 


Sodium  138   (137-145)  mmol/L


 


Potassium  3.7   (3.5-5.1)  mmol/L


 


Chloride  106   ()  mmol/L


 


Carbon Dioxide  24   (22-30)  mmol/L


 


Anion Gap  12.0   (5-15)  MEQ/L


 


BUN  31 H   (7-17)  mg/dL


 


Creatinine  1.98 H   (0.52-1.04)  mg/dL


 


Estimated GFR  27.4   ML/MIN


 


Glucose  157 H   ()  mg/dL


 


Calcium  9.4   (8.4-10.2)  mg/dL


 


Total Bilirubin  0.60   (0.2-1.3)  mg/dL


 


AST  21   (14-36)  U/L


 


ALT  14   (0-35)  U/L


 


Alkaline Phosphatase  143 H   ()  U/L


 


Troponin I    (0.000-0.034)  ng/mL


 


Serum Total Protein  7.2   (6.3-8.2)  g/dL


 


Albumin  3.8   (3.5-5.0)  g/dL














- Progress


Progress: improved


Air Movement: good


Progress Note: 





02/25/20 00:51


Patient reassessed.  No active chest pain.  Troponin elevated.  D-dimer 

elevated.  Heparin drip initiated.  Due to chronic renal insufficiency, 

radiology department declined CTA chest.  We will not have VQ scan available 

till Thursday.  Case discussed with Dr. Dee who accepts transfer to [facility].  Case discussed with Dr.Som Rodriguez who advises aspirin dose.  

Aspirin administered.  Heparin bolus and infusion initiated.  Nitropaste 

administered.  Plan of care discussed with patient.  She agrees to transfer to 

[facility] for further evaluation and treatment.


Blood Culture(s) Obtained: No


Antibiotics given: No


Discussed with Dr.: Other (DR Dee and Dr. Rodriguez)


Counseled pt/family regarding: lab results, diagnosis, rad results





- Departure


Departure Disposition: Transfer, Extended Care Facility


Clinical Impression: 


 NSTEMI (non-ST elevated myocardial infarction), Elevated troponin I level, 

Elevated d-dimer, Chronic renal insufficiency





Condition: Stable


Critical Care Time: Yes


Critical Care Time(excluding separately billable procedures): Critical  

mins


Referrals: 


MARLEEN RIVAS MD [Primary Care Provider] - 


Instructions:  Angina (DC), Chest Pain (DC)

## 2020-02-25 VITALS — HEART RATE: 86 BPM | OXYGEN SATURATION: 97 % | DIASTOLIC BLOOD PRESSURE: 68 MMHG | SYSTOLIC BLOOD PRESSURE: 125 MMHG

## 2020-02-25 LAB
APTT PPP: 76.6 SECONDS (ref 25.3–37)
INR PPP: 1.45 (ref 0.8–3)
PROTHROMBIN TIME: 16.5 SECONDS (ref 9.95–12.35)

## 2020-02-25 RX ADMIN — HEPARIN SODIUM AND DEXTROSE SCH MLS/HR: 10000; 5 INJECTION INTRAVENOUS at 01:07

## 2020-02-25 RX ADMIN — NITROGLYCERIN ONE GM: 20 OINTMENT TOPICAL at 01:23

## 2020-02-25 RX ADMIN — HEPARIN SODIUM ONE UNIT: 10000 INJECTION, SOLUTION INTRAVENOUS; SUBCUTANEOUS at 01:07

## 2020-02-25 RX ADMIN — ASPIRIN ONE MG: 81 TABLET, CHEWABLE ORAL at 01:06

## 2020-02-25 NOTE — XRAY
Indication: Chest pain.



Comparison: January 3, 2020.



Portable chest again demonstrates bibasilar subsegmental atelectasis/scarring

and a few scattered calcified granulomas.  Heart is not enlarged for AP

portable technique.  No new or acute cardiopulmonary abnormalities.

## 2020-03-11 ENCOUNTER — HOSPITAL ENCOUNTER (OUTPATIENT)
Dept: HOSPITAL 33 - ED | Age: 60
Setting detail: OBSERVATION
Discharge: ANOTHER HOSPITAL | End: 2020-03-11
Attending: FAMILY MEDICINE | Admitting: FAMILY MEDICINE
Payer: MEDICARE

## 2020-03-11 VITALS — HEART RATE: 111 BPM | SYSTOLIC BLOOD PRESSURE: 150 MMHG | DIASTOLIC BLOOD PRESSURE: 80 MMHG | OXYGEN SATURATION: 96 %

## 2020-03-11 DIAGNOSIS — I50.9: ICD-10-CM

## 2020-03-11 DIAGNOSIS — R79.89: ICD-10-CM

## 2020-03-11 DIAGNOSIS — I11.0: ICD-10-CM

## 2020-03-11 DIAGNOSIS — Z79.899: ICD-10-CM

## 2020-03-11 DIAGNOSIS — R07.9: ICD-10-CM

## 2020-03-11 DIAGNOSIS — R06.03: Primary | ICD-10-CM

## 2020-03-11 DIAGNOSIS — Z79.2: ICD-10-CM

## 2020-03-11 LAB
ALBUMIN SERPL-MCNC: 4 G/DL (ref 3.5–5)
ALP SERPL-CCNC: 123 U/L (ref 38–126)
ALT SERPL-CCNC: 13 U/L (ref 0–35)
ANION GAP SERPL CALC-SCNC: 12 MEQ/L (ref 5–15)
APTT PPP: 95.1 SECONDS (ref 25.3–37)
ARTERIAL PATENCY WRIST A: YES
ARTERIAL PATENCY WRIST A: YES
AST SERPL QL: 20 U/L (ref 14–36)
BASE EXCESS BLDA CALC-SCNC: -0.3 MMOL/L (ref -2–2)
BASE EXCESS BLDA CALC-SCNC: -4.2 MMOL/L (ref -2–2)
BASOPHILS # BLD AUTO: 0.03 10*3/UL (ref 0–0.4)
BASOPHILS NFR BLD AUTO: 0.2 % (ref 0–0.4)
BILIRUB BLD-MCNC: 0.7 MG/DL (ref 0.2–1.3)
BUN SERPL-MCNC: 25 MG/DL (ref 7–17)
CALCIUM SPEC-MCNC: 9.1 MG/DL (ref 8.4–10.2)
CHLORIDE SERPL-SCNC: 112 MMOL/L (ref 98–107)
CO2 SERPL-SCNC: 22 MMOL/L (ref 22–30)
COHGB BLD-MCNC: 1.1 % THGB (ref 0–6.9)
COHGB BLD-MCNC: 1.6 % THGB (ref 0–6.9)
CREAT SERPL-MCNC: 1.48 MG/DL (ref 0.52–1.04)
EOSINOPHIL # BLD AUTO: 0.32 10*3/UL (ref 0–0.5)
FLUAV AG NPH QL IA: NEGATIVE
FLUBV AG NPH QL IA: NEGATIVE
GAS PNL BLDA: 12.4
GAS PNL BLDA: 13
GAS PNL BLDA: 37 C
GAS PNL BLDA: 37 C
GLUCOSE SERPL-MCNC: 98 MG/DL (ref 74–106)
GLUCOSE UR-MCNC: NEGATIVE MG/DL
HCO3 BLDA-SCNC: 21 MMOL/L (ref 22–28)
HCO3 BLDA-SCNC: 22.8 MMOL/L (ref 22–28)
HCT VFR BLD AUTO: 37.2 % (ref 35–47)
HGB BLD-MCNC: 11.6 GM/DL (ref 12–16)
INHALED O2 CONCENTRATION: 100 %
INHALED O2 CONCENTRATION: 28 %
INR PPP: 1.4 (ref 0.8–3)
LYMPHOCYTES # SPEC AUTO: 2.19 10*3/UL (ref 1–4.6)
MAGNESIUM SERPL-MCNC: 2.1 MG/DL (ref 1.6–2.3)
MCH RBC QN AUTO: 29.3 PG (ref 26–32)
MCHC RBC AUTO-ENTMCNC: 31.2 G/DL (ref 32–36)
METHGB MFR BLDA: 0.5 % (ref 1.4–1.5)
METHGB MFR BLDA: 1 % (ref 1.4–1.5)
MONOCYTES # BLD AUTO: 0.56 10*3/UL (ref 0–1.3)
O2 A-A PPRESDIFF RESPIRATORY: 282 MM[HG]
O2 A-A PPRESDIFF RESPIRATORY: 78 MM[HG]
PCO2 BLDA: 32 MMHG (ref 35–45)
PCO2 BLDA: 38 MMHG (ref 35–45)
PLATELET # BLD AUTO: 206 K/MM3 (ref 150–450)
PO2 BLDA: 384 MMHG (ref 75–100)
PO2 BLDA: 82 MMHG (ref 75–100)
POTASSIUM BLDA-SCNC: 4.1 MMOL/L (ref 3.5–5.1)
POTASSIUM BLDA-SCNC: 4.1 MMOL/L (ref 3.5–5.1)
POTASSIUM SERPLBLD-SCNC: 4 MMOL/L (ref 3.5–5.1)
PROT SERPL-MCNC: 7.4 G/DL (ref 6.3–8.2)
PROT UR STRIP-MCNC: 100 MG/DL
PROTHROMBIN TIME: 15.9 SECONDS (ref 9.95–12.35)
RBC # BLD AUTO: 3.96 M/MM3 (ref 4.1–5.4)
RBC #/AREA URNS HPF: (no result) /HPF (ref 0–2)
RSV AG SPEC QL IA: NEGATIVE
SAO2 % BLDA FROM PO2: 0.51 %
SAO2 % BLDA FROM PO2: 0.58 %
SAO2 % BLDA: 95.7 G/DF (ref 94–100)
SAO2 % BLDA: 97.6 G/DF (ref 94–100)
SAO2 % BLDA: 97.9 % (ref 95–100)
SAO2 % BLDA: 99.7 % (ref 95–100)
SODIUM SERPL-SCNC: 142 MMOL/L (ref 137–145)
SPECIMEN SOURCE: (no result)
SPECIMEN SOURCE: (no result)
WBC # BLD AUTO: 12.8 K/MM3 (ref 4–10.5)
WBC #/AREA URNS HPF: (no result) /HPF (ref 0–5)

## 2020-03-11 PROCEDURE — 99285 EMERGENCY DEPT VISIT HI MDM: CPT

## 2020-03-11 PROCEDURE — G0378 HOSPITAL OBSERVATION PER HR: HCPCS

## 2020-03-11 PROCEDURE — 85730 THROMBOPLASTIN TIME PARTIAL: CPT

## 2020-03-11 PROCEDURE — 93005 ELECTROCARDIOGRAM TRACING: CPT

## 2020-03-11 PROCEDURE — 94002 VENT MGMT INPAT INIT DAY: CPT

## 2020-03-11 PROCEDURE — 93041 RHYTHM ECG TRACING: CPT

## 2020-03-11 PROCEDURE — 36415 COLL VENOUS BLD VENIPUNCTURE: CPT

## 2020-03-11 PROCEDURE — 71045 X-RAY EXAM CHEST 1 VIEW: CPT

## 2020-03-11 PROCEDURE — 96375 TX/PRO/DX INJ NEW DRUG ADDON: CPT

## 2020-03-11 PROCEDURE — 84484 ASSAY OF TROPONIN QUANT: CPT

## 2020-03-11 PROCEDURE — 85610 PROTHROMBIN TIME: CPT

## 2020-03-11 PROCEDURE — 85025 COMPLETE CBC W/AUTO DIFF WBC: CPT

## 2020-03-11 PROCEDURE — 87631 RESP VIRUS 3-5 TARGETS: CPT

## 2020-03-11 PROCEDURE — 36000 PLACE NEEDLE IN VEIN: CPT

## 2020-03-11 PROCEDURE — 51702 INSERT TEMP BLADDER CATH: CPT

## 2020-03-11 PROCEDURE — 36600 WITHDRAWAL OF ARTERIAL BLOOD: CPT

## 2020-03-11 PROCEDURE — 83880 ASSAY OF NATRIURETIC PEPTIDE: CPT

## 2020-03-11 PROCEDURE — 94640 AIRWAY INHALATION TREATMENT: CPT

## 2020-03-11 PROCEDURE — 81001 URINALYSIS AUTO W/SCOPE: CPT

## 2020-03-11 PROCEDURE — 87040 BLOOD CULTURE FOR BACTERIA: CPT

## 2020-03-11 PROCEDURE — 82375 ASSAY CARBOXYHB QUANT: CPT

## 2020-03-11 PROCEDURE — 83735 ASSAY OF MAGNESIUM: CPT

## 2020-03-11 PROCEDURE — 82803 BLOOD GASES ANY COMBINATION: CPT

## 2020-03-11 PROCEDURE — 96374 THER/PROPH/DIAG INJ IV PUSH: CPT

## 2020-03-11 PROCEDURE — 80053 COMPREHEN METABOLIC PANEL: CPT

## 2020-03-11 NOTE — ERPHSYRPT
- History of Present Illness


Time Seen by Provider: 03/11/20 11:27


Source: patient


Exam Limitations: no limitations


Timing/Duration: today


Allergies/Adverse Reactions: 








amoxicillin Allergy (Verified 03/11/20 11:36)


 


oxytetracycline [From Terramycin] Allergy (Verified 03/11/20 11:36)


 


oxytetracycline HCl [From Terramycin] Allergy (Verified 03/11/20 11:36)


 


Penicillins Allergy (Verified 03/11/20 11:36)


 





Home Medications: 








Bumetanide 2 mg PO DAILY 11/01/19 [History]


Carvedilol 3.125 mg*** [Coreg 3.125 MG***] 3.125 mg PO BID 11/01/19 [History]


Cholecalciferol (Vitamin D3) [Vitamin D3] 1,000 unit PO DAILY 11/01/19 [History]


Gabapentin 400 mg PO TID 11/01/19 [History]


Isosorbide Mononitrate [Isosorbide Mononitrate ER] 30 mg PO DAILY 11/01/19 [

History]


Nitroglycerin 0.4 mg Tablet*** [Nitrostat 0.4 MG Tablet***] 0.4 mg SL Q5MIN PRN 

MR X 3 PRN 11/01/19 [History]


PANTOPRAZOLE 40 mg Tablet*** [Protonix 40MG Tablet***] 40 mg PO QAM 11/01/19 [

History]


Potassium Chloride 10 Meq Tab* [Klor Con 10 MEQ***] 10 meq PO DAILY 11/01/19 [

History]


Pramipexole Di-HCl [Mirapex] 1 mg PO BID 11/01/19 [History]


Rosuvastatin Calcium 20 mg PO HS 11/01/19 [History]


Warfarin Sodium 5 mg*** [Coumadin 5 MG***] 5 mg PO DAILY 11/01/19 [History]


calcitrioL [Calcitriol] 0.25 mcg PO DAILY 11/01/19 [History]


metOLazone [Metolazone] 2.5 mg PO UD 11/01/19 [History]


Allopurinol 100 mg*** [Zyloprim 100 mg***] 100 mg PO DAILY 01/02/20 [History]


Cyclobenzaprine HCl 10 mg*** [Cyclobenzaprine 10 MG***] 10 mg PO TID 01/02/20 [

History]


buPROPion HCl [Bupropion HCl Sr] 200 mg PO BID 01/02/20 [History]





Hx Tetanus, Diphtheria Vaccination/Date Given: No


Hx Influenza Vaccination/Date Given: No


Hx Pneumococcal Vaccination/Date Given: Yes





- Review of Systems


Constitutional: No Fever, No Chills


Eyes: No Symptoms


Ears, Nose, & Throat: No Symptoms


Respiratory: Cough, Dyspnea


Cardiac: Edema, No Chest Pain, No Syncope


Abdominal/Gastrointestinal: No Symptoms, No Abdominal Pain, No Nausea, No 

Vomiting, No Diarrhea


Genitourinary Symptoms: No Symptoms, No Dysuria


Musculoskeletal: No Symptoms, No Back Pain, No Neck Pain


Skin: No Symptoms, No Rash


Neurological: No Symptoms, No Dizziness, No Focal Weakness, No Sensory Changes


Psychological: No Symptoms


Endocrine: No Symptoms


All Other Systems: Reviewed and Negative





- Past Medical History


Pertinent Past Medical History: Yes


Neurological History: Stroke, TIA


ENT History: No Pertinent History


Cardiac History: Aneurysm, Congestive Heart Failure, Hypertension, Myocardial 

Infarction (MI)


Respiratory History: CHF, Pneumonia


Endocrine Medical History: No Pertinent History


Musculoskeletal History: Osteoporosis


GI Medical History: GERD


 History: No Pertinent History


Psycho-Social History: Anxiety, Depression


Female Reproductive Disorders: Endometriosis


Other Medical History: LUPUS ANTICOAGULANT DISORDER, MORBID OBESITY, GOUT, 

ANXIETY, DEPRESSION, VENTRICULAR ARRHYTHMIA, CKD STAGE 4, OSTEOPOROSIS, 

HYPERGLYCEMIA, FRACTURE PATELLA 2016





- Past Surgical History


Past Surgical History: Yes


Neuro Surgical History: No Pertinent History


Cardiac: Cardiac Stent


Respiratory: No Pertinent History


Gastrointestinal: No Pertinent History


Genitourinary: No Pertinent History


Musculoskeletal: No Pertinent History


Female Surgical History: Hysterectomy


Other Surgical History: CARPAL TUNNEL-RIGHT WRIST, RIGHT FOOT





- Social History


Smoking Status: Current every day smoker


How long have you smoked: years


Exposure to second hand smoke: No


Drug Use: marijuana


Patient Lives Alone: Yes





- Nursing Vital Signs


Nursing Vital Signs: 


 Initial Vital Signs











Temperature  97.8 F   03/11/20 11:23


 


Pulse Rate  102 H  03/11/20 11:23


 


Respiratory Rate  24   03/11/20 11:23


 


Blood Pressure  177/105   03/11/20 11:23


 


O2 Sat by Pulse Oximetry  95   03/11/20 11:23








 Pain Scale











Pain Intensity                 4

















- Physical Exam


General Appearance: no apparent distress, alert


Eye Exam: PERRL/EOMI


Ears, Nose, Throat Exam: hearing grossly normal


Neck Exam: normal inspection, supple


Respiratory Exam: respiratory distress, airway intact, diminished breath sounds

, accessory muscle use, No crackles/rales, No pleural rub


Cardiovascular/Chest Exam: normal heart sounds, regular rate/rhythm


Abdominal/Gastrointestinal Exam: soft, No tenderness, No distention, No mass


Rectal Exam: deferred


Extremity Exam: non-tender, normal range of motion, normal inspection, no calf 

tenderness, no pedal edema


Peripheral Pulses Exam: dorsalis-pedis (R): 1+, dorsalis-pedis (L): 1+


Neurologic Exam: alert, oriented x 3, cooperative, CNs II-XII nml as tested, 

sensation nml, No motor deficits


Skin Exam: normal color, warm, No dry


**SpO2 Interpretation**: normal


SpO2: 98


O2 Delivery: Nasal Cannula





- Course


Nursing assessment & vital signs reviewed: Yes


EKG Interpreted by Me: Sinus Tach (rate 109), Left Axis Deviation, NORMAL 

INTERVALS





- Radiology Exams


  ** Chest


X-ray Interpretation: Teleradiologist Report (limited by body habitus.  However 

no obvious acute findings.)


Ordered Tests: 


 Active Orders 24 hr











 Category Date Time Status


 


 Cardiac Monitor STAT Care  03/11/20 11:26 Active


 


 EKG-ER Only STAT Care  03/11/20 11:23 Active


 


 Torrez [Catheter-[facility] Torrez] STAT Care  03/11/20 14:29 Active


 


 IV Insertion STAT Care  03/11/20 11:23 Active


 


 Oxygen-ED Only Nasal Cannula 4 lpm Care  03/11/20 11:23 Active


 


 CHEST 1 VIEW (PORTABLE) Stat Exams  03/11/20 11:25 Completed


 


 PICC LINE PLACEMENT Stat Exams  03/11/20 Ordered


 


 ABG [ARTERIAL BLOOD GASES] Stat Lab  03/11/20 14:29 Completed


 


 ARTERIAL BLOOD GASES Stat Lab  03/11/20 11:42 Completed


 


 BLOOD CULTURE Stat Lab  03/11/20 12:20 Received


 


 CBC W DIFF Stat Lab  03/11/20 12:12 Completed


 


 CMP Stat Lab  03/11/20 12:12 Completed


 


 MAGNESIUM Stat Lab  03/11/20 12:12 Completed


 


 NT PRO BNP Stat Lab  03/11/20 12:12 Completed


 


 PROTIME WITH INR Stat Lab  03/11/20 12:12 Completed


 


 PTT Stat Lab  03/11/20 12:12 Completed


 


 TROPONIN Q3H Lab  03/11/20 12:12 Completed


 


 TROPONIN Q3H Lab  03/11/20 14:00 Completed


 


 TROPONIN Q3H Lab  03/11/20 17:30 Ordered


 


 TROPONIN Q3H Lab  03/11/20 20:30 Ordered


 


 TROPONIN Q3H Lab  03/11/20 23:30 Ordered


 


 UA W/RFX UR CULTURE Stat Lab  03/11/20 Completed


 


 BiPap/CPAP STAT RT  03/11/20 13:41 Active


 


 Respiratory Therapy Assessment DAILY RT  03/11/20 11:58 Active


 


 Transfer Order Routine Transfer  03/11/20 Ordered








Medication Summary














Discontinued Medications














Generic Name Dose Route Start Last Admin





  Trade Name Freq  PRN Reason Stop Dose Admin


 


Albuterol/Ipratropium  3 ml  03/11/20 11:23  03/11/20 11:49





  Duoneb 0.5-3 Mg/3 Ml Neb**  IH  03/11/20 11:24  3 ml





  STAT ONE   Administration





     





     





     





     


 


Albuterol/Ipratropium  Confirm  03/11/20 11:46  





  Duoneb 0.5-3 Mg/3 Ml Neb**  Administered  03/11/20 11:47  





  Dose   





  3 ml   





  IH   





  .STK-MED ONE   





     





     





     





     


 


Furosemide  40 mg  03/11/20 13:09  03/11/20 13:05





  Lasix 40 Mg/4 Ml***  IV  03/11/20 13:10  40 mg





  STAT ONE   Administration





     





     





     





     


 


Furosemide  Confirm  03/11/20 13:29  





  Lasix 40 Mg/4 Ml***  Administered  03/11/20 13:30  





  Dose   





  40 mg   





  .ROUTE   





  .STK-MED ONE   





     





     





     





     


 


Nitroglycerin/Dextrose  250 mls @ 1.5 mls/hr  03/11/20 13:40  





  Ntg 0.2mg/Ml In D5w Glass***  IV  04/10/20 13:39  





  .Q24H PRN   





  CHEST PAIN   





     





  Protocol   





  5 MCG/MIN   


 


Sodium Chloride  Confirm  03/11/20 15:15  





  Sodium Chloride 0.9% 1000 Ml  Administered  03/11/20 15:16  





  Dose   





  1,000 mls @ ud   





  .ROUTE   





  .STK-MED ONE   





     





     





     





     


 


Methylprednisolone Sodium Succinate  125 mg  03/11/20 11:23  03/11/20 12:01





  Solu-Medrol 125 Mg***  IV  03/11/20 11:24  125 mg





  STAT ONE   Administration





     





     





     





     


 


Methylprednisolone Sodium Succinate  Confirm  03/11/20 11:59  





  Solu-Medrol 125 Mg***  Administered  03/11/20 12:00  





  Dose   





  125 mg   





  .ROUTE   





  .STK-MED ONE   





     





     





     





     


 


Morphine Sulfate  2 mg  03/11/20 13:08  03/11/20 14:35





  Morphine Sulfate 2 Mg Inj***  IV  03/11/20 13:09  2 mg





  STAT ONE   Administration





     





     





     





     


 


Morphine Sulfate  Confirm  03/11/20 13:28  





  Morphine Sulfate 2 Mg Inj***  Administered  03/11/20 13:29  





  Dose   





  2 mg   





  .ROUTE   





  .STK-MED ONE   





     





     





     





     


 


Nitroglycerin  Confirm  03/11/20 13:39  





  Nitro-Bid 2% Ud Packets***  Administered  03/11/20 13:40  





  Dose   





  1 gm   





  .ROUTE   





  .STK-MED ONE   





     





     





     





     


 


Nitroglycerin  1 gm  03/11/20 14:33  03/11/20 14:00





  Nitro-Bid 2% Ud Packets***  TOP  03/11/20 14:34  1 gm





  STAT ONE   Administration





     





     





     





     











Lab/Rad Data: 


 Laboratory Result Diagrams





 03/11/20 12:12 





 03/11/20 12:12 





 Laboratory Results











  03/11/20 03/11/20 03/11/20 Range/Units





  Unknown 14:29 14:00 


 


WBC     (4.0-10.5)  K/mm3


 


RBC     (4.1-5.4)  M/mm3


 


Hgb     (12.0-16.0)  gm/dl


 


Hct     (35-47)  %


 


MCV     ()  fl


 


MCH     (26-32)  pg


 


MCHC     (32-36)  g/dl


 


RDW     (11.5-14.0)  %


 


Plt Count     (150-450)  K/mm3


 


MPV     (7.5-11.0)  fl


 


Gran %     (36.0-66.0)  %


 


Eos # (Auto)     (0-0.5)  


 


Absolute Lymphs (auto)     (1.0-4.6)  


 


Absolute Monos (auto)     (0.0-1.3)  


 


Lymphocytes %     (24.0-44.0)  %


 


Monocytes %     (0.0-12.0)  %


 


Eosinophils %     (0.00-5.0)  %


 


Basophils %     (0.0-0.4)  %


 


Absolute Granulocytes     (1.4-6.9)  


 


Basophils #     (0-0.4)  


 


PT     (9.95-12.35)  SECONDS


 


INR     (0.8-3.0)  


 


APTT     (25.3-37.0)  SECONDS


 


Puncture Site   RIGHT RADIAL   


 


pCO2   38   (35-45)  mmHg


 


pO2   384 H*   ()  mmHg


 


Base Excess   -4.2 L   (-2.0-2.0)  


 


O2 Saturation   97.6   ()  g/dF


 


ABG pH   7.35   (7.35-7.45)  


 


ABG HCO3   21.0 L   (22-28)  


 


ABG O2 Sat (Measured)   99.7   ()  %


 


James Test   YES   


 


A-a Gradient   282   


 


a/A Ratio   0.58   


 


Hemoglobin   13.0   


 


Carboxyhemoglobin   1.1   (0.0-6.9)  % THgb


 


Methemoglobin   1.0 L   (1.4-1.5)  %


 


Potassium   4.1   (3.5-5.1)  


 


Temperature   37.0   C


 


POC O2 Flow Rate   100   %


 


Vent Mode   BiPAP   


 


Inspiratory BiPAP   14   


 


Expiratory BiPAP   8   


 


Sodium     (137-145)  mmol/L


 


Chloride     ()  mmol/L


 


Carbon Dioxide     (22-30)  mmol/L


 


Anion Gap     (5-15)  MEQ/L


 


BUN     (7-17)  mg/dL


 


Creatinine     (0.52-1.04)  mg/dL


 


Estimated GFR     ML/MIN


 


Glucose     ()  mg/dL


 


Calcium     (8.4-10.2)  mg/dL


 


Magnesium     (1.6-2.3)  mg/dL


 


Total Bilirubin     (0.2-1.3)  mg/dL


 


AST     (14-36)  U/L


 


ALT     (0-35)  U/L


 


Alkaline Phosphatase     ()  U/L


 


Troponin I    0.143 H*  (0.000-0.034)  ng/mL


 


NT-Pro-B Natriuret Pep     (0-900)  pg/mL


 


Serum Total Protein     (6.3-8.2)  g/dL


 


Albumin     (3.5-5.0)  g/dL


 


Urine Color  YELLOW    (YELLOW)  


 


Urine Appearance  CLEAR    (CLEAR)  


 


Urine pH  7.0    (5-6)  


 


Ur Specific Gravity  1.012    (1.005-1.025)  


 


Urine Protein  100    (Negative)  


 


Urine Ketones  NEGATIVE    (NEGATIVE)  


 


Urine Blood  NEGATIVE    (0-5)  Mauricio/ul


 


Urine Nitrite  NEGATIVE    (NEGATIVE)  


 


Urine Bilirubin  NEGATIVE    (NEGATIVE)  


 


Urine Urobilinogen  NEGATIVE    (0-1)  mg/dL


 


Ur Leukocyte Esterase  NEGATIVE    (NEGATIVE)  


 


Urine WBC (Auto)  3-5    (0-5)  /HPF


 


Urine RBC (Auto)  NONE    (0-2)  /HPF


 


U Epithel Cells (Auto)  NONE    (FEW)  /HPF


 


Urine Bacteria (Auto)  NONE    (NEGATIVE)  /HPF


 


Urine Mucus (Auto)  SLIGHT    (NEGATIVE)  /HPF


 


Urine Culture Reflexed  NO    (NO)  


 


Urine Glucose  NEGATIVE    (NEGATIVE)  mg/dL


 


Influenza Type A Ag     (NEGATIVE)  


 


Influenza Type B Ag     (NEGATIVE)  


 


RSV (PCR)     (Negative)  














  03/11/20 03/11/20 03/11/20 Range/Units





  12:12 12:12 12:12 


 


WBC     (4.0-10.5)  K/mm3


 


RBC     (4.1-5.4)  M/mm3


 


Hgb     (12.0-16.0)  gm/dl


 


Hct     (35-47)  %


 


MCV     ()  fl


 


MCH     (26-32)  pg


 


MCHC     (32-36)  g/dl


 


RDW     (11.5-14.0)  %


 


Plt Count     (150-450)  K/mm3


 


MPV     (7.5-11.0)  fl


 


Gran %     (36.0-66.0)  %


 


Eos # (Auto)     (0-0.5)  


 


Absolute Lymphs (auto)     (1.0-4.6)  


 


Absolute Monos (auto)     (0.0-1.3)  


 


Lymphocytes %     (24.0-44.0)  %


 


Monocytes %     (0.0-12.0)  %


 


Eosinophils %     (0.00-5.0)  %


 


Basophils %     (0.0-0.4)  %


 


Absolute Granulocytes     (1.4-6.9)  


 


Basophils #     (0-0.4)  


 


PT   15.9 H   (9.95-12.35)  SECONDS


 


INR   1.40   (0.8-3.0)  


 


APTT   95.1 H   (25.3-37.0)  SECONDS


 


Puncture Site     


 


pCO2     (35-45)  mmHg


 


pO2     ()  mmHg


 


Base Excess     (-2.0-2.0)  


 


O2 Saturation     ()  g/dF


 


ABG pH     (7.35-7.45)  


 


ABG HCO3     (22-28)  


 


ABG O2 Sat (Measured)     ()  %


 


James Test     


 


A-a Gradient     


 


a/A Ratio     


 


Hemoglobin     


 


Carboxyhemoglobin     (0.0-6.9)  % THgb


 


Methemoglobin     (1.4-1.5)  %


 


Potassium    4.0  (3.5-5.1)  


 


Temperature     C


 


POC O2 Flow Rate     %


 


Vent Mode     


 


Inspiratory BiPAP     


 


Expiratory BiPAP     


 


Sodium    142  (137-145)  mmol/L


 


Chloride    112 H  ()  mmol/L


 


Carbon Dioxide    22  (22-30)  mmol/L


 


Anion Gap    12.0  (5-15)  MEQ/L


 


BUN    25 H  (7-17)  mg/dL


 


Creatinine    1.48 H  (0.52-1.04)  mg/dL


 


Estimated GFR    38.4  ML/MIN


 


Glucose    98  ()  mg/dL


 


Calcium    9.1  (8.4-10.2)  mg/dL


 


Magnesium    2.1  (1.6-2.3)  mg/dL


 


Total Bilirubin    0.70  (0.2-1.3)  mg/dL


 


AST    20  (14-36)  U/L


 


ALT    13  (0-35)  U/L


 


Alkaline Phosphatase    123  ()  U/L


 


Troponin I  0.124 H*    (0.000-0.034)  ng/mL


 


NT-Pro-B Natriuret Pep    7930 H  (0-900)  pg/mL


 


Serum Total Protein    7.4  (6.3-8.2)  g/dL


 


Albumin    4.0  (3.5-5.0)  g/dL


 


Urine Color     (YELLOW)  


 


Urine Appearance     (CLEAR)  


 


Urine pH     (5-6)  


 


Ur Specific Gravity     (1.005-1.025)  


 


Urine Protein     (Negative)  


 


Urine Ketones     (NEGATIVE)  


 


Urine Blood     (0-5)  Mauricio/ul


 


Urine Nitrite     (NEGATIVE)  


 


Urine Bilirubin     (NEGATIVE)  


 


Urine Urobilinogen     (0-1)  mg/dL


 


Ur Leukocyte Esterase     (NEGATIVE)  


 


Urine WBC (Auto)     (0-5)  /HPF


 


Urine RBC (Auto)     (0-2)  /HPF


 


U Epithel Cells (Auto)     (FEW)  /HPF


 


Urine Bacteria (Auto)     (NEGATIVE)  /HPF


 


Urine Mucus (Auto)     (NEGATIVE)  /HPF


 


Urine Culture Reflexed     (NO)  


 


Urine Glucose     (NEGATIVE)  mg/dL


 


Influenza Type A Ag     (NEGATIVE)  


 


Influenza Type B Ag     (NEGATIVE)  


 


RSV (PCR)     (Negative)  














  03/11/20 03/11/20 03/11/20 Range/Units





  12:12 12:00 11:42 


 


WBC  12.8 H    (4.0-10.5)  K/mm3


 


RBC  3.96 L    (4.1-5.4)  M/mm3


 


Hgb  11.6 L    (12.0-16.0)  gm/dl


 


Hct  37.2    (35-47)  %


 


MCV  93.9    ()  fl


 


MCH  29.3    (26-32)  pg


 


MCHC  31.2 L    (32-36)  g/dl


 


RDW  15.9 H    (11.5-14.0)  %


 


Plt Count  206    (150-450)  K/mm3


 


MPV  11.6 H    (7.5-11.0)  fl


 


Gran %  75.8 H    (36.0-66.0)  %


 


Eos # (Auto)  0.32    (0-0.5)  


 


Absolute Lymphs (auto)  2.19    (1.0-4.6)  


 


Absolute Monos (auto)  0.56    (0.0-1.3)  


 


Lymphocytes %  17.1 L    (24.0-44.0)  %


 


Monocytes %  4.4    (0.0-12.0)  %


 


Eosinophils %  2.5    (0.00-5.0)  %


 


Basophils %  0.2    (0.0-0.4)  %


 


Absolute Granulocytes  9.67 H    (1.4-6.9)  


 


Basophils #  0.03    (0-0.4)  


 


PT     (9.95-12.35)  SECONDS


 


INR     (0.8-3.0)  


 


APTT     (25.3-37.0)  SECONDS


 


Puncture Site    RIGHT RADIAL  


 


pCO2    32 L  (35-45)  mmHg


 


pO2    82  ()  mmHg


 


Base Excess    -0.3  (-2.0-2.0)  


 


O2 Saturation    95.7  ()  g/dF


 


ABG pH    7.46 H  (7.35-7.45)  


 


ABG HCO3    22.8  (22-28)  


 


ABG O2 Sat (Measured)    97.9  ()  %


 


James Test    YES  


 


A-a Gradient    78  


 


a/A Ratio    0.51  


 


Hemoglobin    12.4  


 


Carboxyhemoglobin    1.6  (0.0-6.9)  % THgb


 


Methemoglobin    0.5 L  (1.4-1.5)  %


 


Potassium    4.1  (3.5-5.1)  


 


Temperature    37.0  C


 


POC O2 Flow Rate    28  %


 


Vent Mode     


 


Inspiratory BiPAP     


 


Expiratory BiPAP     


 


Sodium     (137-145)  mmol/L


 


Chloride     ()  mmol/L


 


Carbon Dioxide     (22-30)  mmol/L


 


Anion Gap     (5-15)  MEQ/L


 


BUN     (7-17)  mg/dL


 


Creatinine     (0.52-1.04)  mg/dL


 


Estimated GFR     ML/MIN


 


Glucose     ()  mg/dL


 


Calcium     (8.4-10.2)  mg/dL


 


Magnesium     (1.6-2.3)  mg/dL


 


Total Bilirubin     (0.2-1.3)  mg/dL


 


AST     (14-36)  U/L


 


ALT     (0-35)  U/L


 


Alkaline Phosphatase     ()  U/L


 


Troponin I     (0.000-0.034)  ng/mL


 


NT-Pro-B Natriuret Pep     (0-900)  pg/mL


 


Serum Total Protein     (6.3-8.2)  g/dL


 


Albumin     (3.5-5.0)  g/dL


 


Urine Color     (YELLOW)  


 


Urine Appearance     (CLEAR)  


 


Urine pH     (5-6)  


 


Ur Specific Gravity     (1.005-1.025)  


 


Urine Protein     (Negative)  


 


Urine Ketones     (NEGATIVE)  


 


Urine Blood     (0-5)  Mauricio/ul


 


Urine Nitrite     (NEGATIVE)  


 


Urine Bilirubin     (NEGATIVE)  


 


Urine Urobilinogen     (0-1)  mg/dL


 


Ur Leukocyte Esterase     (NEGATIVE)  


 


Urine WBC (Auto)     (0-5)  /HPF


 


Urine RBC (Auto)     (0-2)  /HPF


 


U Epithel Cells (Auto)     (FEW)  /HPF


 


Urine Bacteria (Auto)     (NEGATIVE)  /HPF


 


Urine Mucus (Auto)     (NEGATIVE)  /HPF


 


Urine Culture Reflexed     (NO)  


 


Urine Glucose     (NEGATIVE)  mg/dL


 


Influenza Type A Ag   NEGATIVE   (NEGATIVE)  


 


Influenza Type B Ag   NEGATIVE   (NEGATIVE)  


 


RSV (PCR)   NEGATIVE   (Negative)  














- Progress


Progress: improved


Air Movement: fair


Progress Note: 





03/11/20 16:00


Patient had aspirin this morning.  Case discussed with patient's cardiologist 

who states that patient recently had a catheterization procedure performed.  

Patient coronary arteries are relatively clear.  The cause of her elevated 

troponin is likely congestive heart failure.  Lasix and nitroglycerin 

administered.  Patient feels much better.  BiPAP improved her status.  Case 

discussed with Dr. Naik who accepts admission to observation.


Blood Culture(s) Obtained: Yes


Antibiotics given: No


Discussed with : Solomon


Will see patient in: hospital (observation)


Counseled pt/family regarding: lab results, diagnosis, rad results





- Departure


Departure Disposition: Home


Clinical Impression: 


 CHF (congestive heart failure), Troponin level elevated, Elevated brain 

natriuretic peptide (BNP) level, Pitting edema





Condition: Stable


Critical Care Time: Yes


Critical Care Time(excluding separately billable procedures): Critical  

mins


Referrals: 


MARLEEN NAIK MD [Primary Care Provider] - 


Instructions:  Heart Failure

## 2020-03-11 NOTE — XRAY
Indication: Short of breath.  Weakness.



Comparison: February 24, 2020.



Portable chest is limited due to patient's large abdomen obscuring both lung

bases and partially obscuring cardiac silhouette.  Visualized heart and upper

lungs unremarkable.  Bony thorax intact.

## 2020-03-16 ENCOUNTER — HOSPITAL ENCOUNTER (EMERGENCY)
Dept: HOSPITAL 33 - ED | Age: 60
LOS: 1 days | Discharge: ANOTHER HOSPITAL | End: 2020-03-17
Payer: MEDICARE

## 2020-03-16 DIAGNOSIS — F32.9: ICD-10-CM

## 2020-03-16 DIAGNOSIS — R06.03: Primary | ICD-10-CM

## 2020-03-16 DIAGNOSIS — F41.9: ICD-10-CM

## 2020-03-16 DIAGNOSIS — Z79.899: ICD-10-CM

## 2020-03-16 DIAGNOSIS — N18.9: ICD-10-CM

## 2020-03-16 DIAGNOSIS — J44.9: ICD-10-CM

## 2020-03-16 DIAGNOSIS — I12.9: ICD-10-CM

## 2020-03-16 DIAGNOSIS — R74.8: ICD-10-CM

## 2020-03-16 DIAGNOSIS — Z79.01: ICD-10-CM

## 2020-03-16 DIAGNOSIS — I50.9: ICD-10-CM

## 2020-03-16 LAB
ALBUMIN SERPL-MCNC: 3.8 G/DL (ref 3.5–5)
ALP SERPL-CCNC: 100 U/L (ref 38–126)
ALT SERPL-CCNC: 23 U/L (ref 0–35)
ANION GAP SERPL CALC-SCNC: 12.5 MEQ/L (ref 5–15)
AST SERPL QL: 31 U/L (ref 14–36)
BASE EXCESS BLDA CALC-SCNC: 0.4 MMOL/L (ref -2–2)
BASOPHILS # BLD AUTO: 0.02 10*3/UL (ref 0–0.4)
BASOPHILS NFR BLD AUTO: 0.2 % (ref 0–0.4)
BILIRUB BLD-MCNC: 0.6 MG/DL (ref 0.2–1.3)
BUN SERPL-MCNC: 38 MG/DL (ref 7–17)
CALCIUM SPEC-MCNC: 8.8 MG/DL (ref 8.4–10.2)
CHLORIDE SERPL-SCNC: 109 MMOL/L (ref 98–107)
CO2 SERPL-SCNC: 22 MMOL/L (ref 22–30)
COHGB BLD-MCNC: 2.4 % THGB (ref 0–6.9)
CREAT SERPL-MCNC: 1.91 MG/DL (ref 0.52–1.04)
EOSINOPHIL # BLD AUTO: 0.54 10*3/UL (ref 0–0.5)
FLUAV AG NPH QL IA: NEGATIVE
FLUBV AG NPH QL IA: NEGATIVE
GAS PNL BLDA: 12.3
GAS PNL BLDA: 37 C
GLUCOSE SERPL-MCNC: 135 MG/DL (ref 74–106)
HCO3 BLDA-SCNC: 22.9 MMOL/L (ref 22–28)
HCT VFR BLD AUTO: 34.5 % (ref 35–47)
HGB BLD-MCNC: 10.7 GM/DL (ref 12–16)
INHALED O2 CONCENTRATION: 28 %
INR PPP: 1.43 (ref 0.8–3)
LYMPHOCYTES # SPEC AUTO: 1.87 10*3/UL (ref 1–4.6)
MCH RBC QN AUTO: 29.7 PG (ref 26–32)
MCHC RBC AUTO-ENTMCNC: 31 G/DL (ref 32–36)
METHGB MFR BLDA: 0.7 % (ref 1.4–1.5)
MONOCYTES # BLD AUTO: 0.63 10*3/UL (ref 0–1.3)
O2 A-A PPRESDIFF RESPIRATORY: 92 MM[HG]
PCO2 BLDA: 30 MMHG (ref 35–45)
PLATELET # BLD AUTO: 117 K/MM3 (ref 150–450)
PO2 BLDA: 70 MMHG (ref 75–100)
POTASSIUM BLDA-SCNC: 4.3 MMOL/L (ref 3.5–5.1)
POTASSIUM SERPLBLD-SCNC: 4.2 MMOL/L (ref 3.5–5.1)
PROT SERPL-MCNC: 7 G/DL (ref 6.3–8.2)
PROTHROMBIN TIME: 16.3 SECONDS (ref 9.95–12.35)
RBC # BLD AUTO: 3.6 M/MM3 (ref 4.1–5.4)
RSV AG SPEC QL IA: NEGATIVE
SAO2 % BLDA FROM PO2: 0.43 %
SAO2 % BLDA: 93.1 G/DF (ref 94–100)
SAO2 % BLDA: 96.1 % (ref 95–100)
SODIUM SERPL-SCNC: 139 MMOL/L (ref 137–145)
SPECIMEN SOURCE: (no result)
WBC # BLD AUTO: 13.2 K/MM3 (ref 4–10.5)

## 2020-03-16 PROCEDURE — 93005 ELECTROCARDIOGRAM TRACING: CPT

## 2020-03-16 PROCEDURE — 85025 COMPLETE CBC W/AUTO DIFF WBC: CPT

## 2020-03-16 PROCEDURE — 99285 EMERGENCY DEPT VISIT HI MDM: CPT

## 2020-03-16 PROCEDURE — 36000 PLACE NEEDLE IN VEIN: CPT

## 2020-03-16 PROCEDURE — 82803 BLOOD GASES ANY COMBINATION: CPT

## 2020-03-16 PROCEDURE — 96375 TX/PRO/DX INJ NEW DRUG ADDON: CPT

## 2020-03-16 PROCEDURE — 93041 RHYTHM ECG TRACING: CPT

## 2020-03-16 PROCEDURE — 83605 ASSAY OF LACTIC ACID: CPT

## 2020-03-16 PROCEDURE — 51702 INSERT TEMP BLADDER CATH: CPT

## 2020-03-16 PROCEDURE — 84484 ASSAY OF TROPONIN QUANT: CPT

## 2020-03-16 PROCEDURE — 87631 RESP VIRUS 3-5 TARGETS: CPT

## 2020-03-16 PROCEDURE — 96374 THER/PROPH/DIAG INJ IV PUSH: CPT

## 2020-03-16 PROCEDURE — 36415 COLL VENOUS BLD VENIPUNCTURE: CPT

## 2020-03-16 PROCEDURE — 85610 PROTHROMBIN TIME: CPT

## 2020-03-16 PROCEDURE — 36600 WITHDRAWAL OF ARTERIAL BLOOD: CPT

## 2020-03-16 PROCEDURE — 83880 ASSAY OF NATRIURETIC PEPTIDE: CPT

## 2020-03-16 PROCEDURE — 82375 ASSAY CARBOXYHB QUANT: CPT

## 2020-03-16 PROCEDURE — 71045 X-RAY EXAM CHEST 1 VIEW: CPT

## 2020-03-16 PROCEDURE — 87040 BLOOD CULTURE FOR BACTERIA: CPT

## 2020-03-16 PROCEDURE — 80053 COMPREHEN METABOLIC PANEL: CPT

## 2020-03-16 PROCEDURE — 94002 VENT MGMT INPAT INIT DAY: CPT

## 2020-03-16 NOTE — ERPHSYRPT
- History of Present Illness


Time Seen by Provider: 03/16/20 22:32


Source: patient, family


Exam Limitations: no limitations


Physician History: 





This is a morbidly obese noncompliant white female who has history of COPD, 

chronic kidney disease, coronary artery disease, hypertension, anxiety, 

depression and congestive heart failure who presents to the emergency 

department with acute respiratory distress.  Patient was admitted to [facility] on 3/11/2020 and discharged on 3/14/2020.  Patient

's family states the patient continues to smoke and is noncompliant.  Patient 

symptoms of shortness of breath were worsening today.  Patient underwent venous 

Doppler of her lower extremities which were negative for any deep venous 

thromboses while at [facility].  A VQ scan was also 

performed during that hospitalization and it was negative for pulmonary emboli.

  Patient was discharged with a diagnosis of a non-ST elevated myocardial 

infarction and patient was placed on aspirin Lovenox carvedilol, statin and 

Imdur.  Patient's cardiologist is Dr. Macias.


Timing/Duration: today


Activities at Onset: none


Severity of Dyspnea-Max: moderate


Severity of Dyspnea-Current: moderate


Possible Cause: frequent episodes


Modifying Factors: Improves With: activity


Associated Symptoms: anxiety, cough


Allergies/Adverse Reactions: 








amoxicillin Allergy (Verified 03/16/20 22:40)


 


 unknown reaction 


oxytetracycline [From Terramycin] Allergy (Verified 03/16/20 22:40)


 


oxytetracycline HCl [From Terramycin] Allergy (Verified 03/16/20 22:40)


 


 unknown reaction 


Penicillins Allergy (Verified 03/16/20 22:40)


 


 unknown reaction 





Home Medications: 








Bumetanide 2 mg PO DAILY 11/01/19 [History]


Carvedilol 3.125 mg*** [Coreg 3.125 MG***] 3.125 mg PO BID 11/01/19 [History]


Cholecalciferol (Vitamin D3) [Vitamin D3] 1,000 unit PO DAILY 11/01/19 [History]


Gabapentin 400 mg PO TID 11/01/19 [History]


Isosorbide Mononitrate [Isosorbide Mononitrate ER] 30 mg PO DAILY 11/01/19 [

History]


Nitroglycerin 0.4 mg Tablet*** [Nitrostat 0.4 MG Tablet***] 0.4 mg SL Q5MIN PRN 

MR X 3 PRN 11/01/19 [History]


PANTOPRAZOLE 40 mg Tablet*** [Protonix 40MG Tablet***] 40 mg PO QAM 11/01/19 [

History]


Pramipexole Di-HCl [Mirapex] 1 mg PO BID 11/01/19 [History]


Rosuvastatin Calcium 20 mg PO HS 11/01/19 [History]


Warfarin Sodium 5 mg*** [Coumadin 5 MG***] 5 mg PO DAILY 11/01/19 [History]


calcitrioL [Calcitriol] 0.25 mcg PO DAILY 11/01/19 [History]


Allopurinol 100 mg*** [Zyloprim 100 mg***] 100 mg PO DAILY 01/02/20 [History]


Cyclobenzaprine HCl 10 mg*** [Cyclobenzaprine 10 MG***] 10 mg PO TID 01/02/20 [

History]


buPROPion HCl [Bupropion HCl Sr] 200 mg PO BID 01/02/20 [History]


Aspirin [Aspirin EC] 1 tab PO DAILY 03/11/20 [History]


Enoxaparin Sodium 80 mg SPINAL Q12H 03/11/20 [History]





Hx Tetanus, Diphtheria Vaccination/Date Given: No


Hx Influenza Vaccination/Date Given: No


Hx Pneumococcal Vaccination/Date Given: Yes





- Review of Systems


Constitutional: No Symptoms


Eyes: No Symptoms


Ears, Nose, & Throat: No Symptoms


Respiratory: Cough, Dyspnea


Cardiac: No Symptoms


Abdominal/Gastrointestinal: No Symptoms


Genitourinary Symptoms: No Symptoms


Musculoskeletal: No Symptoms


Skin: No Symptoms


Neurological: No Symptoms


Psychological: Anxiety


Endocrine: No Symptoms


Hematologic/Lymphatic: No Symptoms


Immunological/Allergic: No Symptoms


All Other Systems: Reviewed and Negative





- Past Medical History


Pertinent Past Medical History: Yes


Neurological History: Stroke, TIA


ENT History: No Pertinent History


Cardiac History: Aneurysm, Congestive Heart Failure, Hypertension, Myocardial 

Infarction (MI)


Respiratory History: CHF, Pneumonia


Endocrine Medical History: No Pertinent History


Musculoskeletal History: Osteoporosis


GI Medical History: GERD


 History: No Pertinent History


Psycho-Social History: Anxiety, Depression


Female Reproductive Disorders: Endometriosis


Other Medical History: LUPUS ANTICOAGULANT DISORDER, MORBID OBESITY, GOUT, 

ANXIETY, DEPRESSION, VENTRICULAR ARRHYTHMIA, CKD STAGE 4, OSTEOPOROSIS, 

HYPERGLYCEMIA, FRACTURE PATELLA 2016





- Past Surgical History


Past Surgical History: Yes


Neuro Surgical History: No Pertinent History


Cardiac: Cardiac Stent


Respiratory: No Pertinent History


Gastrointestinal: No Pertinent History


Genitourinary: No Pertinent History


Musculoskeletal: No Pertinent History


Female Surgical History: Hysterectomy


Other Surgical History: CARPAL TUNNEL-RIGHT WRIST, RIGHT FOOT





- Social History


Smoking Status: Current some day smoker


How long have you smoked: years


Exposure to second hand smoke: No


Drug Use: marijuana


Patient Lives Alone: Yes





- Nursing Vital Signs


Nursing Vital Signs: 


 Initial Vital Signs











Temperature  98.6 F   03/16/20 22:25


 


Pulse Rate  115 H  03/16/20 22:25


 


Respiratory Rate  40 H  03/16/20 22:25


 


Blood Pressure  163/94   03/16/20 22:25


 


O2 Sat by Pulse Oximetry  91 L  03/16/20 22:25








 Pain Scale











Pain Intensity                 5

















- Physical Exam


General Appearance: moderate distress, alert, anxiety, obese


Eye Exam: PERRL/EOMI, eyes nml inspection


Ears, Nose, Throat Exam: hearing grossly normal, normal pharynx


Neck Exam: normal inspection, non-tender, supple, full range of motion


Respiratory Exam: airway intact, wheezing, No chest tenderness, No respiratory 

distress


Cardiovascular/Chest Exam: tachycardia


Abdominal/Gastrointestinal Exam: soft, normal bowel sounds, No tenderness


Rectal Exam: not done


Extremity Exam: non-tender, normal range of motion, pedal edema (Bilateral feet 

and ankles)


Neurologic Exam: alert, oriented x 3, cooperative, CNs II-XII nml as tested


Skin Exam: normal color, warm, dry


Lymphatic Exam: No adenopathy


**SpO2 Interpretation**: borderline oxygenation


O2 Delivery: Room Air





- Course


Nursing assessment & vital signs reviewed: Yes


EKG Interpreted by Me: RATE ([id number]), Sinus Tach, Left Axis Deviation, NORMAL 

INTERVALS, NORMAL QRS


Ordered Tests: 


 Active Orders 24 hr











 Category Date Time Status


 


 Cardiac Monitor STAT Care  03/16/20 22:39 Active


 


 EKG-ER Only STAT Care  03/16/20 22:38 Active


 


 [place]rez [Catheter-[place] Torrez] STAT Care  03/17/20 00:20 Active


 


 IV Insertion STAT Care  03/16/20 22:38 Active


 


 Oxygen-ED Only Nasal Cannula 2 lpm Care  03/16/20 22:38 Active


 


 CHEST 1 VIEW (PORTABLE) Stat Exams  03/16/20 22:39 Taken


 


 ARTERIAL BLOOD GASES Stat Lab  03/16/20 22:38 Completed


 


 BLOOD CULTURE Stat Lab  03/16/20 23:00 Received


 


 CBC W DIFF Stat Lab  03/16/20 23:00 Completed


 


 CMP Stat Lab  03/16/20 23:00 Completed


 


 Lactic Acid Stat Lab  03/16/20 22:38 Completed


 


 NT PRO BNP Stat Lab  03/16/20 23:00 Completed


 


 PROTIME WITH INR Stat Lab  03/16/20 23:00 Completed


 


 TROPONIN Q3H Lab  03/16/20 23:00 Completed


 


 TROPONIN Q3H Lab  03/17/20 01:45 Ordered


 


 TROPONIN Q3H Lab  03/17/20 04:45 Ordered


 


 TROPONIN Q3H Lab  03/17/20 07:45 Ordered


 


 TROPONIN Q3H Lab  03/17/20 10:45 Ordered








Medication Summary














Discontinued Medications














Generic Name Dose Route Start Last Admin





  Trade Name Freq  PRN Reason Stop Dose Admin


 


Bumetanide  2 mg  03/16/20 23:38  03/16/20 23:47





  Bumex 1 Mg***  IV  03/16/20 23:39  2 mg





  STAT STA   Administration





     





     





     





     


 


Bumetanide  Confirm  03/16/20 23:46  





  Bumex 1 Mg***  Administered  03/16/20 23:47  





  Dose   





  2 mg   





  .ROUTE   





  .[facility] ONE   





     





     





     





     











Lab/Rad Data: 


 Laboratory Result Diagrams





 03/16/20 23:00 





 03/16/20 23:00 





 Laboratory Results











  03/16/20 03/16/20 03/16/20 Range/Units





  23:20 23:00 23:00 


 


WBC     (4.0-10.5)  K/mm3


 


RBC     (4.1-5.4)  M/mm3


 


Hgb     (12.0-16.0)  gm/dl


 


Hct     (35-47)  %


 


MCV     ()  fl


 


MCH     (26-32)  pg


 


MCHC     (32-36)  g/dl


 


RDW     (11.5-14.0)  %


 


Plt Count     (150-450)  K/mm3


 


MPV     (7.5-11.0)  fl


 


Gran %     (36.0-66.0)  %


 


Eos # (Auto)     (0-0.5)  


 


Absolute Lymphs (auto)     (1.0-4.6)  


 


Absolute Monos (auto)     (0.0-1.3)  


 


Lymphocytes %     (24.0-44.0)  %


 


Monocytes %     (0.0-12.0)  %


 


Eosinophils %     (0.00-5.0)  %


 


Basophils %     (0.0-0.4)  %


 


Absolute Granulocytes     (1.4-6.9)  


 


Basophils #     (0-0.4)  


 


PT    16.3 H  (9.95-12.35)  SECONDS


 


INR    1.43  (0.8-3.0)  


 


Puncture Site     


 


pCO2     (35-45)  mmHg


 


pO2     ()  mmHg


 


Base Excess     (-2.0-2.0)  


 


O2 Saturation     ()  g/dF


 


ABG pH     (7.35-7.45)  


 


ABG HCO3     (22-28)  


 


ABG O2 Sat (Measured)     ()  %


 


James Test     


 


A-a Gradient     


 


a/A Ratio     


 


Hemoglobin     


 


Carboxyhemoglobin     (0.0-6.9)  % THgb


 


Methemoglobin     (1.4-1.5)  %


 


Potassium     (3.5-5.1)  


 


Temperature     C


 


POC O2 Flow Rate     %


 


Sodium     (137-145)  mmol/L


 


Chloride     ()  mmol/L


 


Carbon Dioxide     (22-30)  mmol/L


 


Anion Gap     (5-15)  MEQ/L


 


BUN     (7-17)  mg/dL


 


Creatinine     (0.52-1.04)  mg/dL


 


Estimated GFR     ML/MIN


 


Glucose     ()  mg/dL


 


Lactic Acid     (0.4-2.0)  


 


Calcium     (8.4-10.2)  mg/dL


 


Total Bilirubin     (0.2-1.3)  mg/dL


 


AST     (14-36)  U/L


 


ALT     (0-35)  U/L


 


Alkaline Phosphatase     ()  U/L


 


Troponin I   0.541 H*   (0.000-0.034)  ng/mL


 


NT-Pro-B Natriuret Pep     (0-900)  pg/mL


 


Serum Total Protein     (6.3-8.2)  g/dL


 


Albumin     (3.5-5.0)  g/dL


 


Influenza Type A Ag  NEGATIVE    (NEGATIVE)  


 


Influenza Type B Ag  NEGATIVE    (NEGATIVE)  


 


RSV (PCR)  NEGATIVE    (Negative)  














  03/16/20 03/16/20 03/16/20 Range/Units





  23:00 23:00 22:38 


 


WBC   13.2 H   (4.0-10.5)  K/mm3


 


RBC   3.60 L   (4.1-5.4)  M/mm3


 


Hgb   10.7 L   (12.0-16.0)  gm/dl


 


Hct   34.5 L   (35-47)  %


 


MCV   95.8   ()  fl


 


MCH   29.7   (26-32)  pg


 


MCHC   31.0 L   (32-36)  g/dl


 


RDW   16.5 H   (11.5-14.0)  %


 


Plt Count   117 L   (150-450)  K/mm3


 


MPV   12.1 H   (7.5-11.0)  fl


 


Gran %   76.7 H   (36.0-66.0)  %


 


Eos # (Auto)   0.54 H   (0-0.5)  


 


Absolute Lymphs (auto)   1.87   (1.0-4.6)  


 


Absolute Monos (auto)   0.63   (0.0-1.3)  


 


Lymphocytes %   14.2 L   (24.0-44.0)  %


 


Monocytes %   4.8   (0.0-12.0)  %


 


Eosinophils %   4.1   (0.00-5.0)  %


 


Basophils %   0.2   (0.0-0.4)  %


 


Absolute Granulocytes   10.09 H   (1.4-6.9)  


 


Basophils #   0.02   (0-0.4)  


 


PT     (9.95-12.35)  SECONDS


 


INR     (0.8-3.0)  


 


Puncture Site    LEFT RADIAL  


 


pCO2    30 L  (35-45)  mmHg


 


pO2    70 L  ()  mmHg


 


Base Excess    0.4  (-2.0-2.0)  


 


O2 Saturation    93.1 L  ()  g/dF


 


ABG pH    7.49 H  (7.35-7.45)  


 


ABG HCO3    22.9  (22-28)  


 


ABG O2 Sat (Measured)    96.1  ()  %


 


James Test    yes  


 


A-a Gradient    92  


 


a/A Ratio    0.43  


 


Hemoglobin    12.3  


 


Carboxyhemoglobin    2.4  (0.0-6.9)  % THgb


 


Methemoglobin    0.7 L  (1.4-1.5)  %


 


Potassium  4.2   4.3  (3.5-5.1)  


 


Temperature    37.0  C


 


POC O2 Flow Rate    28  %


 


Sodium  139    (137-145)  mmol/L


 


Chloride  109 H    ()  mmol/L


 


Carbon Dioxide  22    (22-30)  mmol/L


 


Anion Gap  12.5    (5-15)  MEQ/L


 


BUN  38 H    (7-17)  mg/dL


 


Creatinine  1.91 H    (0.52-1.04)  mg/dL


 


Estimated GFR  28.6    ML/MIN


 


Glucose  135 H    ()  mg/dL


 


Lactic Acid    0.9  (0.4-2.0)  


 


Calcium  8.8    (8.4-10.2)  mg/dL


 


Total Bilirubin  0.60    (0.2-1.3)  mg/dL


 


AST  31    (14-36)  U/L


 


ALT  23    (0-35)  U/L


 


Alkaline Phosphatase  100    ()  U/L


 


Troponin I     (0.000-0.034)  ng/mL


 


NT-Pro-B Natriuret Pep  84041 H    (0-900)  pg/mL


 


Serum Total Protein  7.0    (6.3-8.2)  g/dL


 


Albumin  3.8    (3.5-5.0)  g/dL


 


Influenza Type A Ag     (NEGATIVE)  


 


Influenza Type B Ag     (NEGATIVE)  


 


RSV (PCR)     (Negative)  














- Progress


Progress: improved


Air Movement: good


Progress Note: 





03/17/20 00:32


Differential diagnosis: Myocardial infarction, congestive heart failure, 

pneumonia





Medical decision making: This patient is noncompliant, continues to smoke.  

Within the last 5 days the patient has had a VQ scan which was normal and a 

negative venous Doppler of her lower extremities.  Patient's congestive heart 

failure has been worsening and her troponin level is increased despite no 

significant changes in her renal function.  Clinically, her symptoms are 

worsening.  She is having more difficulty breathing prior to her arrival here.  

Upon arrival into the emergency department this evening, the patient was placed 

on BiPAP which improved her symptoms.  I contacted Dr. Rodriguez who is the 

emergency room physician at [facility] emergency 

department.  I reviewed the patient's history, condition, EKG, laboratory data 

and chest x-ray results.  He accepts the patient for transfer and admission.


03/17/20 00:38





Blood Culture(s) Obtained: No


Antibiotics given: No


Counseled pt/family regarding: lab results, diagnosis, need for follow-up, rad 

results





- Departure


Departure Disposition: Transfer


Clinical Impression: 


 Acute respiratory distress, Congestive heart failure, Elevated troponin





Condition: Fair


Critical Care Time: Yes


Referrals: 


MARLEEN RIVAS MD [Primary Care Provider] - 


Instructions:  Heart Failure

## 2020-03-17 VITALS — DIASTOLIC BLOOD PRESSURE: 81 MMHG | OXYGEN SATURATION: 97 % | HEART RATE: 103 BPM | SYSTOLIC BLOOD PRESSURE: 124 MMHG

## 2020-03-17 NOTE — XRAY
Indication: Short of breath.



Comparison: March 11, 2020.



Portable chest better inflated with chronic bibasilar subsegmental

atelectasis/scarring and incidental calcified granulomas.  New cardiomegaly

without consolidation/large effusion.  Remaining chest unremarkable.

## 2020-03-19 NOTE — PCM.DS
Discharge Summary


Date of Admission: 


03/11/20 16:00





Date of Discharge: 


03/11/2020





Admitting Physician: 


MARLEEN RIVAS MD





Consults: 


Dr Wheat who was on the call cardiologist for PMG and Dr Macias had been 

contacted by ER physician


Primary Care Provider: 


MARLEEN RIVAS MD








Allergies


Allergies





amoxicillin Allergy (Verified 03/16/20 22:40)


 


 unknown reaction 


oxytetracycline [From Terramycin] Allergy (Verified 03/16/20 22:40)


 


oxytetracycline HCl [From Terramycin] Allergy (Verified 03/16/20 22:40)


 


 unknown reaction 


Penicillins Allergy (Verified 03/16/20 22:40)


 


 unknown reaction 











Hospital Summary





- Hospital Course


Hospital Course: 


59 yr old female with multiple medical problems presented to ER earlier in the 

day with complaints of SOB.As noted from ER physician: Patient had aspirin this 

morning.  Case discussed with patient's cardiologist Dr Macias who states that 

patient recently had a catheterization procedure performed.  Patient's coronary 

arteries are relatively clear.  The cause of her elevated troponin is likely 

congestive heart failure.  Lasix and nitroglycerin administered.  Patient feels 

much better.  BiPAP improved her status.  Case discussed with Dr. Rivas who 

accepts admission to observation.





Following admission to ICU I was notified by nurse that patient was reporting 4/

10 chest pain. Patient reports she stated this in the ER. I called to clarify 

with Dr Gilbert who stated patient was not complaining of chest while in ER and 

that she was given morphine for headache and nitro for chf exacerbation. Patient

's coumadin had been held for a CV procedure that was supposed to be perfomed 

tomorrow, however due to her acute SOB presentation this was cancelled by the 

cardiologist. Patient was given tylenol for her chest pain as I did not feel 

that morphine was necessary at this time. Patient still had nitro paste from ER 

as well. Patient's pain improved with tylenol. Nurse notified me around 7-8pm 

that patient had a significantly elevated trop. I instructed her to call the 

cardiologists for this patient as they had been consulted from ER. Cardiology 

recommended transfer to [facility]. Transfer center was notified and 

[facility] hospitalist accepted transfer. Nurse called to inform me that the 

patient then started having EKG changes including trigemeny but still no ST seg 

changes. She also reported that Dr Wheat was concerned about possible PE due 

to the held coumadin. 








- Vitals & Intake/Output


Vital Signs: 





 Vital Signs











Temperature  98.6 F   03/11/20 20:00


 


Pulse Rate  111 H  03/11/20 20:00


 


Respiratory Rate  28 H  03/11/20 20:00


 


Blood Pressure  150/80   03/11/20 20:00


 


O2 Sat by Pulse Oximetry  96   03/11/20 20:00














- Lab


Result Diagrams: 


 03/11/20 12:12





 03/11/20 12:12


Micro Results-Entire Visit: 





 Microbiology











 03/11/20 12:20 Blood Culture Gram Stain - Final





 Blood    Not Reportable





 Blood Culture - Final





    NO GROWTH


 


 03/11/20 12:12 Blood Culture Gram Stain - Final





 Blood    Not Reportable





 Blood Culture - Final





    NO GROWTH














- Procedures and Test


Procedures and Tests throughout Hospitalization: 





 Therapy Orders & Screens





03/11/20 11:58


Respiratory Therapy Assessment DAILY 


   Comment: 





03/11/20 13:41


BiPap/CPAP STAT 


   Comment: 





03/11/20 16:09


Oxygen NASAL CANNULA 2 lpm 


   Comment: 


   Diagnosis: CHF





03/11/20 18:00


RT Screen per Nursing Assess ONCE 


   Comment: Protocol Order


   Physician Instructions: Greater than 3 points order RT Admission Screen


   Reason For Exam: Triggered on Admission


   Diagnosis: CHF elevated troponin, elivated BNP


   Diagnosis: CHF elevated troponin, elivated BNP


   Pneumonia: No


   Home O2: No


   Asthma: No


   CHF: Yes


   Home CPAP/BIPAP: Yes


   Home Nebs/MDI: No


   Total Points: 8


Smoking Cessation Education ONCE 


   Comment: 


   Diagnosis: CHF elevated troponin, elivated BNP


   Smoking Status: Current some day smoker


   How long have you smoked: years


   Have you smoked in the past 12 months: Yes


   Approximately how many cigarettes per day: 0.5PPD


   Do you dip or chew tobacco: No


   If,Former Smoker,when did you quit: 1.5 years














Discharge Exam


General Appearance: other (Please  refer to the ER Dr and Nurse notes for 

documenation in regards to patient's ROS and Physical exam as I did not 

personally see the patient prior to DC.)





Final Diagnosis/Problem List





- Final Discharge Diagnosis/Problem


(1) Acute respiratory distress


Status: Acute   


Assessment & Plan: 


Patient was acutely short of breath and required bipap in ER. This is likely 

multifactorial as patient has numerous comorbidities. She appeared to have 

improved based on ER documentation.


Code(s): R06.03 - ACUTE RESPIRATORY DISTRESS   





(2) CHF (congestive heart failure)


Status: Acute   


Assessment & Plan: 


Patient's BNP was elevated. Acute on chronic chf exacerbation. Patient was 

started on lasix in ER and her symptoms started improving. 


Code(s): I50.9 - HEART FAILURE, UNSPECIFIED   





(3) Chest pain


Status: Acute   


Assessment & Plan: 


Unsure when pain started as nurse reports patient stated she had chest pain in 

ER but ER doctor reported patient was not reporting chest pain and that he 

would not have tried to admit her to our hospital if she was having chest. 

Patient was on her routine home meds except for coumadin. She was temporarily 

on lovenox till her CV procedure which was supposed to take place tomorrow. 

Patient was given morphine in ER and tylenol during her admission for pain. 

Case was discussed with Dr Wheat and it was felt that with elevated trop and 

chest pain that patient would benefit from a hospital that is CV capable. 


Code(s): R07.9 - CHEST PAIN, UNSPECIFIED   





(4) Elevated brain natriuretic peptide (BNP) level


Status: Acute   


Assessment & Plan: 


CHF exacerbation. Please see plan above


Code(s): R79.89 - OTHER SPECIFIED ABNORMAL FINDINGS OF BLOOD CHEMISTRY   





(5) Elevated troponin I level


Status: Acute   


Assessment & Plan: 


Patient had elevated troponins with normal ekg and recent neg cardiac cath. 

Patient also has CKD. With the trop trending up patient was sent to [facility] 

for cardiac monitoring and tx if needed. 


Code(s): R79.89 - OTHER SPECIFIED ABNORMAL FINDINGS OF BLOOD CHEMISTRY   





(6) Essential hypertension


Status: Acute   


Assessment & Plan: 


Resumed routine home meds.


Code(s): I10 - ESSENTIAL (PRIMARY) HYPERTENSION   





- Discharge


Disposition: [facility]


Condition: Stable


Prescriptions: 


No Action


   Bumetanide 2 mg PO DAILY


   calcitrioL [Calcitriol] 0.25 mcg PO DAILY


   Warfarin Sodium 5 mg*** [Coumadin 5 MG***] 5 mg PO DAILY


   Cholecalciferol (Vitamin D3) [Vitamin D3] 1,000 unit PO DAILY


   Isosorbide Mononitrate [Isosorbide Mononitrate ER] 30 mg PO DAILY


   Rosuvastatin Calcium 20 mg PO HS


   Pramipexole Di-HCl [Mirapex] 1 mg PO BID


   PANTOPRAZOLE 40 mg Tablet*** [Protonix 40MG Tablet***] 40 mg PO QAM


   Gabapentin 400 mg PO TID


   Carvedilol 3.125 mg*** [Coreg 3.125 MG***] 3.125 mg PO BID


   Nitroglycerin 0.4 mg Tablet*** [Nitrostat 0.4 MG Tablet***] 0.4 mg SL Q5MIN 

PRN MR X 3 PRN


     PRN Reason: angina


   buPROPion HCl [Bupropion HCl Sr] 200 mg PO BID


   Cyclobenzaprine HCl 10 mg*** [Cyclobenzaprine 10 MG***] 10 mg PO TID


   Allopurinol 100 mg*** [Zyloprim 100 mg***] 100 mg PO DAILY


   Enoxaparin Sodium 80 mg SPINAL Q12H


   Aspirin [Aspirin EC] 1 tab PO DAILY


Follow up with: 


MARLEEN RIVAS MD [Primary Care Provider] - 1 Week


Forms:  Ambulance Transport Record, Transfer Record Inter-Agency

## 2020-03-27 ENCOUNTER — HOSPITAL ENCOUNTER (EMERGENCY)
Dept: HOSPITAL 33 - ED | Age: 60
Discharge: HOME | End: 2020-03-27
Payer: MEDICARE

## 2020-03-27 VITALS — OXYGEN SATURATION: 97 %

## 2020-03-27 VITALS — DIASTOLIC BLOOD PRESSURE: 83 MMHG | SYSTOLIC BLOOD PRESSURE: 145 MMHG | HEART RATE: 80 BPM

## 2020-03-27 DIAGNOSIS — I50.9: ICD-10-CM

## 2020-03-27 DIAGNOSIS — I25.10: ICD-10-CM
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
## 2020-03-27 DIAGNOSIS — J44.9: ICD-10-CM
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
## 2020-03-27 DIAGNOSIS — S29.011A: Primary | ICD-10-CM

## 2020-03-27 DIAGNOSIS — Z79.01: ICD-10-CM

## 2020-03-27 DIAGNOSIS — Z79.899: ICD-10-CM

## 2020-03-27 DIAGNOSIS — R07.9: ICD-10-CM

## 2020-03-27 DIAGNOSIS — Z72.0: ICD-10-CM

## 2020-03-27 DIAGNOSIS — E78.5: ICD-10-CM

## 2020-03-27 DIAGNOSIS — I10: ICD-10-CM

## 2020-03-27 PROCEDURE — 71100 X-RAY EXAM RIBS UNI 2 VIEWS: CPT

## 2020-03-27 PROCEDURE — 99283 EMERGENCY DEPT VISIT LOW MDM: CPT

## 2020-03-27 NOTE — XRAY
Indication: Pain following fall 4-5 days ago.



Comparison: None



2 views of the right ribs demonstrates moderate right AC degenerative

arthropathy, right lung base atelectasis/scarring, pulmonary/hilar calcified

granulomas, and cardiomegaly.  No other bony, articular, or soft tissue

abnormalities.

## 2020-03-27 NOTE — ERPHSYRPT
- History of Present Illness


Time Seen by Provider: 03/27/20 09:48


Historian: patient


Exam Limitations: no limitations


Patient Subjective Stated Complaint: pt reports falling 3-4 days ago, states 

she landed on her backside. pt also states she struck her head, denies LOC. pt 

reports pain to the upper chest and increased pain with inspiration and 

movement.


Triage Nursing Assessment: pt is aox3, pupils perrl, ambulated to room with slow

, steady gait, pt answers questions appropriately, tearful upon exam, pt 

afebrile, resps easy and non labored, cap refill < 3 seconds, pt with bruise to 

the left breast, skin is intact, no obvious injury or deformity noted, pt does 

have edema to the bilat lower extremities.


Physician History: 





59 years old female with history of coronary artery disease, congestive heart 

failure, tobacco abuse, COPD, hypertension, hyperlipidemia presented in the ER 

with chief complaint of right anterior and posterior chest wall pain for the 

last 4 days.  Patient reports she accidentally fell on her buttock on the 

floor.  It was a ground-level fall and seems like everything above was Giard 

and she started to have gradually worsening sharp pain moderate to severe 

intensity.  She has taken Tylenol couple of times with no significant relief.  

Patient reports pain aggravated with movements, palpation, deep breathing, 

stretching and partial relief with being still.  No chest pain on the left.  

Patient reports her pain is different than heart pains.  No difficulty 

breathing otherwise.  Denies any abdominal pain nausea vomiting or diarrhea.  

Patient is tearful.


Timing/Duration: day(s) (4), gradual onset, sudden, worse


Quality: sharpness, throbbing


Location: back


Chest Pain Radiation: no radiation


Severity of Pain-Max: moderate


Severity of Pain-Current: moderate


Modifying Factors: Improves With: breathing, movement, palpation


Associated Symptoms: denies symptoms


Nitro Today/Relief: no nitro taken today


Aspirin Treatment Today: no aspirin today


Allergies/Adverse Reactions: 








amoxicillin Allergy (Verified 03/27/20 09:43)


 


 unknown reaction 


oxytetracycline [From Terramycin] Allergy (Verified 03/27/20 09:43)


 


oxytetracycline HCl [From Terramycin] Allergy (Verified 03/27/20 09:43)


 


 unknown reaction 


Penicillins Allergy (Verified 03/27/20 09:43)


 


 unknown reaction 





Home Medications: 








Bumetanide 2 mg PO DAILY 11/01/19 [History]


Carvedilol 3.125 mg*** [Coreg 3.125 MG***] 3.125 mg PO BID 11/01/19 [History]


Cholecalciferol (Vitamin D3) [Vitamin D3] 1,000 unit PO DAILY 11/01/19 [History]


Gabapentin 400 mg PO TID 11/01/19 [History]


Isosorbide Mononitrate [Isosorbide Mononitrate ER] 30 mg PO DAILY 11/01/19 [

History]


Nitroglycerin 0.4 mg Tablet*** [Nitrostat 0.4 MG Tablet***] 0.4 mg SL Q5MIN PRN 

MR X 3 PRN 11/01/19 [History]


PANTOPRAZOLE 40 mg Tablet*** [Protonix 40MG Tablet***] 40 mg PO QAM 11/01/19 [

History]


Pramipexole Di-HCl [Mirapex] 1 mg PO BID 11/01/19 [History]


Rosuvastatin Calcium 20 mg PO HS 11/01/19 [History]


Warfarin Sodium 5 mg*** [Coumadin 5 MG***] 5 mg PO DAILY 11/01/19 [History]


calcitrioL [Calcitriol] 0.25 mcg PO DAILY 11/01/19 [History]


Allopurinol 100 mg*** [Zyloprim 100 mg***] 100 mg PO DAILY 01/02/20 [History]


Cyclobenzaprine HCl 10 mg*** [Cyclobenzaprine 10 MG***] 10 mg PO TID 01/02/20 [

History]


buPROPion HCl [Bupropion HCl Sr] 200 mg PO BID 01/02/20 [History]


Aspirin [Aspirin EC] 1 tab PO DAILY 03/11/20 [History]


Enoxaparin Sodium 80 mg SPINAL Q12H 03/11/20 [History]





Hx Tetanus, Diphtheria Vaccination/Date Given: Yes


Hx Influenza Vaccination/Date Given: Yes


Hx Pneumococcal Vaccination/Date Given: Yes


Immunizations Up to Date: Yes





Travel Risk





- International Travel


Have you traveled outside of the country in past 3 weeks: No


Have you or anyone close to you been diagnosed with or: No


Do your reside in a community with a known COVID-19 case?: No


If Yes where:: [facility]





- Coronavirus Screening


Has patient experienced Coronavirus symptoms: No





- Review of Systems


Constitutional: No Symptoms


Eyes: No Symptoms


Ears, Nose, & Throat: No Symptoms


Respiratory: No Symptoms


Cardiac: Chest Pain


Abdominal/Gastrointestinal: No Symptoms


Genitourinary Symptoms: No Symptoms


Musculoskeletal: Fall, Myalgias


Skin: No Symptoms


Neurological: No Symptoms


Psychological: No Symptoms


Endocrine: No Symptoms


Hematologic/Lymphatic: No Symptoms


Immunological/Allergic: No Symptoms





- Past Medical History


Pertinent Past Medical History: Yes


Neurological History: Stroke, TIA


ENT History: No Pertinent History


Cardiac History: Aneurysm, Congestive Heart Failure, Hypertension, Myocardial 

Infarction (MI)


Respiratory History: CHF, Pneumonia


Endocrine Medical History: No Pertinent History


Musculoskeletal History: Osteoporosis


GI Medical History: GERD


 History: No Pertinent History


Psycho-Social History: Anxiety, Depression


Female Reproductive Disorders: Endometriosis


Other Medical History: LUPUS ANTICOAGULANT DISORDER, MORBID OBESITY, GOUT, 

ANXIETY, DEPRESSION, VENTRICULAR ARRHYTHMIA, CKD STAGE 4, OSTEOPOROSIS, 

HYPERGLYCEMIA, FRACTURE PATELLA 2016





- Past Surgical History


Past Surgical History: Yes


Neuro Surgical History: No Pertinent History


Cardiac: Cardiac Stent


Respiratory: No Pertinent History


Gastrointestinal: No Pertinent History


Genitourinary: No Pertinent History


Musculoskeletal: No Pertinent History


Female Surgical History: Hysterectomy


Other Surgical History: CARPAL TUNNEL-RIGHT WRIST, RIGHT FOOT





- Social History


Smoking Status: Current every day smoker


How long have you smoked: years


Exposure to second hand smoke: No


Drug Use: marijuana


Patient Lives Alone: Yes





- Female History


Hx Pregnant Now: No





- Nursing Vital Signs


Nursing Vital Signs: 


 Initial Vital Signs











Temperature  97.2 F   03/27/20 09:31


 


Pulse Rate  100 H  03/27/20 09:31


 


Respiratory Rate  20   03/27/20 09:31


 


Blood Pressure  156/88   03/27/20 09:31


 


O2 Sat by Pulse Oximetry  97   03/27/20 09:31








 Pain Scale











Pain Intensity                 10

















- Physical Exam


General Appearance: no apparent distress


Eye Exam: PERRL/EOMI, eyes nml inspection


Ears, Nose, Throat Exam: normal ENT inspection, TMs normal, pharynx normal


Neck Exam: normal inspection, non-tender, supple, full range of motion


Respiratory Exam: normal breath sounds, chest tenderness (right upper anterior 

and posterior , no midline tenderness), lungs clear, airway intact


Cardiovascular Exam: regular rate/rhythm, normal heart sounds, normal 

peripheral pulses, edema


Back Exam: normal inspection, normal range of motion, No CVA tenderness


Extremity Exam: normal inspection


Neurologic Exam: alert, oriented x 3, cooperative


Skin Exam: normal color


**SpO2 Interpretation**: normal


SpO2: 97


O2 Delivery: Room Air


Ordered Tests: 


 Active Orders 24 hr











 Category Date Time Status


 


 RIBS UNILATERAL Stat Exams  03/27/20 10:29 Completed








Medication Summary














Discontinued Medications














Generic Name Dose Route Start Last Admin





  Trade Name Mariann  PRN Reason Stop Dose Admin


 


Hydrocodone Bitart/Acetaminophen  1 tab  03/27/20 10:04  03/27/20 10:31





  Norco 5/325 Mg***  PO  03/27/20 10:05  1 tab





  STAT ONE   Administration





     





     





     





     


 


Hydrocodone Bitart/Acetaminophen  Confirm  03/27/20 10:26  





  Norco 5/325 Mg***  Administered  03/27/20 10:27  





  Dose   





  1 tab   





  .ROUTE   





  .STK-MED ONE   





     





     





     





     














- Progress


Progress: improved, re-examined


Air Movement: good


Progress Note: 





03/27/20 11:37


59 years old is evaluated for right-sided chest wall pain.  She does not have 

any crepitus but has some tenderness diffusely anterior and posteriorly.  X-

rays of negative for any fracture ribs or any acute abnormality in the lungs.  

Patient believes she has a chest wall strain.  Recommended taking Tylenol 3-4 

times a day regularly and outpatient follow-up with primary care.  Discussed 

signs symptoms of worsening needing return to ER which he seems understanding.  

Stable for discharge.


Blood Culture(s) Obtained: No


Antibiotics given: No


Counseled pt/family regarding: diagnosis, need for follow-up, rad results, 

smoking cessation





- Departure


Departure Disposition: Home


Clinical Impression: 


Strain of chest wall


Qualifiers:


 Encounter type: initial encounter Qualified Code(s): S29.011A - Strain of 

muscle and tendon of front wall of thorax, initial encounter





Condition: Stable


Critical Care Time: No


Referrals: 


MARLEEN RIVAS MD [Primary Care Provider] - Follow Up with PCP/3 days


Instructions:  Preventing Falls, Muscle Strain (DC)


Additional Instructions: 


Tylenol 500 mg every 6 hours as needed.  Follow-up with your primary care 

physician for reevaluation.  Return to ER for any worsening.

## 2020-08-23 ENCOUNTER — HOSPITAL ENCOUNTER (EMERGENCY)
Dept: HOSPITAL 33 - ED | Age: 60
Discharge: HOME | End: 2020-08-23
Payer: MEDICARE

## 2020-08-23 VITALS — DIASTOLIC BLOOD PRESSURE: 76 MMHG | SYSTOLIC BLOOD PRESSURE: 120 MMHG | HEART RATE: 66 BPM

## 2020-08-23 VITALS — OXYGEN SATURATION: 97 %

## 2020-08-23 DIAGNOSIS — S20.219A: Primary | ICD-10-CM

## 2020-08-23 DIAGNOSIS — S00.03XA: ICD-10-CM
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
## 2020-08-23 DIAGNOSIS — Z99.2: ICD-10-CM

## 2020-08-23 DIAGNOSIS — Z79.899: ICD-10-CM

## 2020-08-23 DIAGNOSIS — N18.9: ICD-10-CM

## 2020-08-23 LAB
ALBUMIN SERPL-MCNC: 3.7 G/DL (ref 3.5–5)
ALP SERPL-CCNC: 94 U/L (ref 38–126)
ALT SERPL-CCNC: 13 U/L (ref 0–35)
ANION GAP SERPL CALC-SCNC: 13.7 MEQ/L (ref 5–15)
AST SERPL QL: 29 U/L (ref 14–36)
BASOPHILS # BLD AUTO: 0.03 10*3/UL (ref 0–0.4)
BASOPHILS NFR BLD AUTO: 0.3 % (ref 0–0.4)
BILIRUB BLD-MCNC: 0.8 MG/DL (ref 0.2–1.3)
BLD SMEAR INTERP: YES
BUN SERPL-MCNC: 44 MG/DL (ref 7–17)
CALCIUM SPEC-MCNC: 9.5 MG/DL (ref 8.4–10.2)
CHLORIDE SERPL-SCNC: 101 MMOL/L (ref 98–107)
CK SERPL-CCNC: 307 U/L (ref 30–135)
CO2 SERPL-SCNC: 25 MMOL/L (ref 22–30)
CREAT SERPL-MCNC: 3.73 MG/DL (ref 0.52–1.04)
EOSINOPHIL # BLD AUTO: 0.5 10*3/UL (ref 0–0.5)
GLUCOSE SERPL-MCNC: 105 MG/DL (ref 74–106)
HCT VFR BLD AUTO: 34.4 % (ref 35–47)
HGB BLD-MCNC: 11.1 GM/DL (ref 12–16)
INR PPP: 1.41 (ref 0.8–3)
LIPASE SERPL-CCNC: 121 U/L (ref 23–300)
LYMPHOCYTES # SPEC AUTO: 2.54 10*3/UL (ref 1–4.6)
MCH RBC QN AUTO: 31.4 PG (ref 26–32)
MCHC RBC AUTO-ENTMCNC: 32.3 G/DL (ref 32–36)
MONOCYTES # BLD AUTO: 0.71 10*3/UL (ref 0–1.3)
PLATELET # BLD AUTO: 74 K/MM3 (ref 150–450)
POTASSIUM SERPLBLD-SCNC: 4 MMOL/L (ref 3.5–5.1)
PROT SERPL-MCNC: 7 G/DL (ref 6.3–8.2)
PROTHROMBIN TIME: 16 SECONDS (ref 9.95–12.35)
RBC # BLD AUTO: 3.53 M/MM3 (ref 4.1–5.4)
SODIUM SERPL-SCNC: 135 MMOL/L (ref 137–145)
WBC # BLD AUTO: 11.3 K/MM3 (ref 4–10.5)

## 2020-08-23 PROCEDURE — 93005 ELECTROCARDIOGRAM TRACING: CPT

## 2020-08-23 PROCEDURE — 36415 COLL VENOUS BLD VENIPUNCTURE: CPT

## 2020-08-23 PROCEDURE — 70450 CT HEAD/BRAIN W/O DYE: CPT

## 2020-08-23 PROCEDURE — 72125 CT NECK SPINE W/O DYE: CPT

## 2020-08-23 PROCEDURE — 80076 HEPATIC FUNCTION PANEL: CPT

## 2020-08-23 PROCEDURE — 82550 ASSAY OF CK (CPK): CPT

## 2020-08-23 PROCEDURE — 74177 CT ABD & PELVIS W/CONTRAST: CPT

## 2020-08-23 PROCEDURE — 80048 BASIC METABOLIC PNL TOTAL CA: CPT

## 2020-08-23 PROCEDURE — 85610 PROTHROMBIN TIME: CPT

## 2020-08-23 PROCEDURE — 83690 ASSAY OF LIPASE: CPT

## 2020-08-23 PROCEDURE — 36000 PLACE NEEDLE IN VEIN: CPT

## 2020-08-23 PROCEDURE — 96374 THER/PROPH/DIAG INJ IV PUSH: CPT

## 2020-08-23 PROCEDURE — 99284 EMERGENCY DEPT VISIT MOD MDM: CPT

## 2020-08-23 PROCEDURE — 85025 COMPLETE CBC W/AUTO DIFF WBC: CPT

## 2020-08-23 PROCEDURE — 71260 CT THORAX DX C+: CPT

## 2020-08-23 NOTE — ERPHSYRPT
- History of Present Illness


Time Seen by Provider: 08/23/20 07:44


Source: patient, EMS


Exam Limitations: no limitations


Patient Subjective Stated Complaint: pt here for multi falls yesterday, she 

states she lost her balance, she states her son was home to help get her up, she

co pain to chest worse with deep breath, she is unsure if she hit chest on 

anything


Triage Nursing Assessment: arrived per ambualnce pt alert, resp easy, has face 

mask in place, no bruising or abrasion noted, chest tender to palpate, has 

dialysis cath to right side of chest, moves all ext well


Physician History: 





The patient is a 6-year-old female with a past medical history significant for 

lupus in addition to end-stage kidney disease for which she is currently on 

hemodialysis on Mondays Wednesdays and Fridays as well as taking Coumadin for 

anticoagulation for reasons that are not completely clear other than having a 

past medical history significant for lupus presents with a chief complaint of 

chest pain after a fall that occurred last night.


Of note, the patient does endorse having a history of multiple falls but is a 

poor historian.


She tells me that she did fall last night and fell on her buttocks and was 

unable to get off the floor until a friend came and found her and called EMS to 

have her transported to the emergency department for further evaluation.


Her main complaint in the emergency department was midsternal chest pain that 

increases whenever she bends or twists her torso and is pleuritic and increases 

upon palpation of her chest wall.


She states she is unsure if she hit her chest or impacted in the object during 

the fall last night.


She is not exactly sure why she fell either and cannot tell me if she lost c

onsciousness prior to falling.


She also complained of left upper quadrant abdominal pain and epigastric pain 

with tenderness to these regions that occurred after the fall.


She endorsed having a full run of dialysis this last Friday and is due for 

dialysis tomorrow.


She appears to have a bruise to the left orbital region that appears to be old 

but she cannot recall how she sustained this injury other than she might of 

fallen.


The pain is a mild to moderate pain that is nonradiating constant.


Allergies/Adverse Reactions: 








amoxicillin Allergy (Verified 08/23/20 07:36)


   


   unknown reaction 


oxytetracycline [From Terramycin] Allergy (Verified 08/23/20 07:36)


   


oxytetracycline HCl [From Terramycin] Allergy (Verified 08/23/20 07:36)


   


   unknown reaction 


Penicillins Allergy (Verified 08/23/20 07:36)


   


   unknown reaction 





Home Medications: 








Bumetanide 2 mg PO DAILY 11/01/19 [History]


Carvedilol 3.125 mg*** [Coreg 3.125 MG***] 3.125 mg PO BID 11/01/19 [History]


Cholecalciferol (Vitamin D3) [Vitamin D3] 1,000 unit PO DAILY 11/01/19 [History]


Gabapentin 400 mg PO TID 11/01/19 [History]


Isosorbide Mononitrate [Isosorbide Mononitrate ER] 30 mg PO DAILY 11/01/19 

[History]


Nitroglycerin 0.4 mg Tablet*** [Nitrostat 0.4 MG Tablet***] 0.4 mg SL Q5MIN PRN 

MR X 3 PRN 11/01/19 [History]


PANTOPRAZOLE 40 mg Tablet*** [Protonix 40MG Tablet***] 40 mg PO QAM 11/01/19 

[History]


Pramipexole Di-HCl [Mirapex] 1 mg PO BID 11/01/19 [History]


Rosuvastatin Calcium 20 mg PO HS 11/01/19 [History]


Warfarin Sodium 5 mg*** [Coumadin 5 MG***] 5 mg PO DAILY 11/01/19 [History]


calcitrioL [Calcitriol] 0.25 mcg PO DAILY 11/01/19 [History]


Allopurinol 100 mg*** [Zyloprim 100 mg***] 100 mg PO DAILY 01/02/20 [History]


Cyclobenzaprine HCl 10 mg*** [Cyclobenzaprine 10 MG***] 10 mg PO TID 01/02/20 

[History]


buPROPion HCl [Bupropion HCl Sr] 200 mg PO BID 01/02/20 [History]


Aspirin [Aspirin EC] 1 tab PO DAILY 03/11/20 [History]


Enoxaparin Sodium 80 mg SPINAL Q12H 03/11/20 [History]





Hx Tetanus, Diphtheria Vaccination/Date Given: Yes


Hx Influenza Vaccination/Date Given: Yes


Hx Pneumococcal Vaccination/Date Given: Yes


Immunizations Up to Date: Yes





Travel Risk





- International Travel


Have you traveled outside of the country in past 3 weeks: No





- Coronavirus Screening


Are you exhibiting any of the following symptoms?: No


Close contact with a COVID-19 positive Pt in past 14-21 Days: No





- Review of Systems


Constitutional: No Symptoms


Eyes: No Symptoms


Ears, Nose, & Throat: No Symptoms


Respiratory: Dyspnea


Cardiac: Chest Pain


Abdominal/Gastrointestinal: Abdominal Pain, No Nausea, No Vomiting


Musculoskeletal: Back Pain


Skin: No Symptoms


Neurological: Headache


Psychological: No Symptoms


Endocrine: No Symptoms


Hematologic/Lymphatic: No Symptoms


Immunological/Allergic: No Symptoms


All Other Systems: Reviewed and Negative





- Past Medical History


Pertinent Past Medical History: Yes


Neurological History: Stroke, TIA


ENT History: No Pertinent History


Cardiac History: Aneurysm, Congestive Heart Failure, Hypertension, Myocardial 

Infarction (MI)


Respiratory History: CHF, Pneumonia


Endocrine Medical History: No Pertinent History


Musculoskeletal History: Osteoporosis


GI Medical History: GERD


 History: No Pertinent History


Psycho-Social History: Anxiety, Depression


Female Reproductive Disorders: Endometriosis


Other Medical History: LUPUS ANTICOAGULANT DISORDER, MORBID OBESITY, GOUT, 

ANXIETY, DEPRESSION, VENTRICULAR ARRHYTHMIA, CKD STAGE 4, OSTEOPOROSIS, 

HYPERGLYCEMIA, FRACTURE PATELLA 2016





- Past Surgical History


Past Surgical History: Yes


Neuro Surgical History: No Pertinent History


Cardiac: Cardiac Stent


Respiratory: No Pertinent History


Gastrointestinal: No Pertinent History


Genitourinary: No Pertinent History


Musculoskeletal: No Pertinent History


Female Surgical History: Hysterectomy


Other Surgical History: CARPAL TUNNEL-RIGHT WRIST, RIGHT FOOT





- Social History


Smoking Status: Current every day smoker


How long have you smoked: years


Exposure to second hand smoke: No


Drug Use: marijuana


Patient Lives Alone: Yes





- Female History


Hx Last Menstrual Period: psot


Hx Pregnant Now: No





- Nursing Vital Signs


Nursing Vital Signs: 


                               Initial Vital Signs











Temperature  97.6 F   08/23/20 07:27


 


Pulse Rate  94 H  08/23/20 07:27


 


Respiratory Rate  18   08/23/20 07:27


 


Blood Pressure  121/77   08/23/20 07:27


 


O2 Sat by Pulse Oximetry  97   08/23/20 07:27








                                   Pain Scale











Pain Intensity                 4

















- Jaron Coma Score


Best Eye Response (Jaron): (4) open spontaneously


Best Verbal Response ([place]): (5) oriented


Best Motor Response ([place]): (6) obeys commands


Jaron Total: 15





- Physical Exam


General Appearance: no apparent distress


Head Injury: no evidence of injury


Eye Exam: PERRL/EOMI, eyes nml inspection


ENT Exam: airway nml


Neck Exam: supple, No trachea midline, No normal alignment, No JVD


Respiratory/Chest Exam: chest tenderness, normal breath sounds, No respiratory 

distress


Cardiovascular Exam: normal heart sounds, regular rate/rhythm, normal peripheral

 pulses, other (Tunneled dialysis catheter noted to the right anterior chest 

wall. No sign of surrounding infection), No murmur, No JVD, No gallop


Gastrointestinal Exam: tenderness (LUQ tenderness with no rebound tenderness or 

guarding )


Genitalia Exam: normal genital exam


Rectal Exam: deferred


Back Exam: normal inspection, No CVA tenderness


Extremity Exam: normal inspection, capillary refill <3 sec, No sensory deficit


Neurologic Exam: alert, oriented x 3, cooperative


Skin Exam: normal color, warm, dry, No rash, No petechiae, No jaundice


**SpO2 Interpretation**: normal


SpO2: 97


O2 Delivery: Room Air





- Course


Nursing assessment & vital signs reviewed: Yes


EKG Interpreted by Me: RATE, Left Axis Deviation, Other (Sinus rhythm, vent rate

 80 bpm, probable left atrial enlargement, RI interval 142 ms, QRS duration 101 

ms, QT/QTc 416/480 ms, )





- CT Exams


  ** Cervical Spine


CT Interpretation: Negative





  ** Head


CT Interpretation: Other (Parietal hematoma)





  ** Chest


CT Interpretation: Other (No evidence of PE or rib fracture.)





  ** Abdomen/Pelvis


CT Interpretation: Other (No traumatic injuries noted)


Ordered Tests: 


                               Active Orders 24 hr











 Category Date Time Status


 


 Ambulate Patient ROUTINE Care  08/23/20 11:00 Completed


 


 EKG-ER Only STAT Care  08/23/20 07:53 Completed


 


 IV Insertion STAT Care  08/23/20 07:53 Completed


 


 NPO (ED) STAT Care  08/23/20 07:53 Completed


 


 ABDOMEN AND PELVIS W CONTRAST [CT] Stat Exams  08/23/20 10:15 Completed


 


 CERVICAL SPINE WO CONTRAST [CT] Stat Exams  08/23/20 09:59 Completed


 


 CHEST WITH CONTRAST [CT] Stat Exams  08/23/20 07:51 Completed


 


 HEAD WITHOUT CONTRAST [CT] Stat Exams  08/23/20 09:50 Completed


 


 BMP/HFII Stat Lab  08/23/20 09:03 Completed


 


 CBC W DIFF Stat Lab  08/23/20 09:03 Completed


 


 CK-Creatinine Phosphokinase Stat Lab  08/23/20 09:03 Completed


 


 LIPASE Stat Lab  08/23/20 09:03 Completed


 


 PROTIME WITH INR Stat Lab  08/23/20 09:03 Completed


 


 Transfer Order Routine Transfer  08/23/20 Ordered








Medication Summary














Discontinued Medications














Generic Name Dose Route Start Last Admin





  Trade Name Mariann  PRN Reason Stop Dose Admin


 


Fentanyl Citrate  25 mcg  08/23/20 08:02  08/23/20 09:22





  Sublimaze 100 Mcg/2 Ml***  IV  08/23/20 08:03  25 mcg





  STAT ONE   Administration


 


Fentanyl Citrate  Confirm  08/23/20 09:20 





  Sublimaze 100 Mcg/2 Ml***  Administered  08/23/20 09:21 





  Dose  





  100 mcg  





  .ROUTE  





  .STK-MED ONE  











Lab/Rad Data: 


                           Laboratory Result Diagrams





                                 08/23/20 09:03 





                                 08/23/20 09:03 





                               Laboratory Results











  08/23/20 08/23/20 08/23/20 Range/Units





  09:03 09:03 09:03 


 


WBC    11.3 H  (4.0-10.5)  K/mm3


 


RBC    3.53 L  (4.1-5.4)  M/mm3


 


Hgb    11.1 L  (12.0-16.0)  gm/dl


 


Hct    34.4 L  (35-47)  %


 


MCV    97.5  ()  fl


 


MCH    31.4  (26-32)  pg


 


MCHC    32.3  (32-36)  g/dl


 


RDW    14.4 H  (11.5-14.0)  %


 


Plt Count    74 L  (150-450)  K/mm3


 


MPV    11.7 H  (7.5-11.0)  fl


 


Gran %    66.5 H  (36.0-66.0)  %


 


Eos # (Auto)    0.50  (0-0.5)  


 


Absolute Lymphs (auto)    2.54  (1.0-4.6)  


 


Absolute Monos (auto)    0.71  (0.0-1.3)  


 


Lymphocytes %    22.5 L  (24.0-44.0)  %


 


Monocytes %    6.3  (0.0-12.0)  %


 


Eosinophils %    4.4  (0.00-5.0)  %


 


Basophils %    0.3  (0.0-0.4)  %


 


Absolute Granulocytes    7.52 H  (1.4-6.9)  


 


Basophils #    0.03  (0-0.4)  


 


PT  16.0 H    (9.95-12.35)  SECONDS


 


INR  1.41    (0.8-3.0)  


 


Sodium   135 L   (137-145)  mmol/L


 


Potassium   4.0   (3.5-5.1)  mmol/L


 


Chloride   101   ()  mmol/L


 


Carbon Dioxide   25   (22-30)  mmol/L


 


Anion Gap   13.7   (5-15)  MEQ/L


 


BUN   44 H   (7-17)  mg/dL


 


Creatinine   3.73 H   (0.52-1.04)  mg/dL


 


Estimated GFR   13.2   ML/MIN


 


Glucose   105   ()  mg/dL


 


Calcium   9.5   (8.4-10.2)  mg/dL


 


Total Bilirubin   0.80   (0.2-1.3)  mg/dL


 


Direct Bilirubin   0.3   (0.0-0.4)  mg/dL


 


AST   29   (14-36)  U/L


 


ALT   13   (0-35)  U/L


 


Alkaline Phosphatase   94   ()  U/L


 


Creatine Kinase   307 H   ()  U/L


 


Serum Total Protein   7.0   (6.3-8.2)  g/dL


 


Albumin   3.7   (3.5-5.0)  g/dL


 


Lipase   121   ()  U/L


 


Slides for Path Review    YES  














- Progress


Progress: improved


Progress Note: 





08/23/20 08:12


I reviewed the patient's EMR and it appears she had an abdominal ultrasound 

dated March 2019 that showed hepatosplenomegaly in addition to bilateral renal 

cyst.  The remaining abdominal sonogram was negative, typically with no evidence

 of AAA.





I also noted the patient had a recent hospitalization in March 2000 28[facility] in 

which she was admitted for chest pain and cardiology, Dr. Macias reported that 

the patient had a recent cardiac catheterization performed with relatively clear

 coronaries and that her elevated troponin was likely due to her parentheses 

congestive heart failure)


08/23/20 08:31


Trying to order a CTA chest abdomen pelvis with contrast.  The patient does have

 CKD and is on dialysis Mondays Wednesdays and Fridays and given that PE is on 

the differential for potential visceral organ damage with active extract given 

her history of traumatic falls I believe this is warranted and relatively safe 

to give.  However per this institution's protocols they will not allow me to 

scan this individual with contrast given that her GFR is likely going to be 

below 50.  I have attempted to contact the radiology director, the ED director 

in addition to the radiologist himself Tobin nonsuccessful.


08/23/20 08:35


I spoke to Dr. Spencer, radiologist at [facility], and discussed the case with him 

specifically IV contrast in the setting of CKD and HD.  He was agreed the 

patient can get IV contrast if she is on dialysis and due for dialysis tomorrow.

  Recommended having the patient sign a consent form to receive contrast, which 

we will have her do.  





08/23/20 08:38


The patient's nurse has contracted the patient's dialysis center to confirm the 

patient did indeed go to dialysis last Friday and does have a scheduled dialysis

 run tomorrow.  


08/23/20 08:49


I spoke to Dr. Mendoza, [facility] radiologist, and discussed the case with him.  He's

 ok with the patient getting contrast as well as long as she is scheduled to 

dialyze tomorrow.  


08/23/20 09:13


There is a delay in work-up due to the inability to obtain IV access.  Plan had 

to come down and obtain the patient's left knee and arterial stick.  I had to 

obtain peripheral IV access via an EJ.  I was able to successfully place a left 

EJ with a 20-gauge IV.  The site was prepped and cleaned with Shur-Clens and the

 patient was placed in the Trendelenburg position.  This was placed after I 

unsuccessfully attempted to access the right EJ but the line infiltrated.





08/23/20 09:17


I discussed the patient's medications with her he confirmed that she still 

taking gabapentin.  I believe some of her somnolence in addition to her falls 

are partly contributed to the fact that this medication can cause you to to 

become encephalopathic especially in patients that have CKD given that it is 

renally excreted.  Also endorsed that she smokes marijuana from time to time and

 the nurse noted that the patient had a marijuana pipe in her medication box.


08/23/20 09:58


Patient's labs were reviewed and it appears her BMP is reflective of her known 

CKD, there is evidence of thrombocytopenia, no significant anemia requiring 

blood transfusion at this time, and her INR appears to be subtherapeutic.


08/23/20 10:56


CT of her chest and pelvis as well as abdomen showed no acute pathology.  CT 

head showed evidence of a parietal hematoma but otherwise was benign and her 

cervical spine CT showed no evidence of fracture or dislocation.


08/23/20 11:37


I am attempting to contact Dr. Rivas and it appears Dr. Cordova is on-call for 

[facility] at this time.  The  will page Dr. Cordova to call me back in the 

emergency department so I can discuss the case with him, specifically any 

changes in the patient's medications, specifically the gabapentin and whether or

 not the patient should remain on Coumadin given her falls risk and 

thrombocytopenia.  I will also try to arrange outpatient follow-up.


08/23/20 11:46


I spoke to Dr. Cordova and discussed the case with him.  He agreed the patient 

should stop her Coumadin given her falls risk. Recommended decreasing her 

gabapentin to 400 mg bid and will have the patient f/u with [facility] as an 

outpatient.  


08/23/20 20:47


The patient reportedly left without her discharge instructions and the patient 

is never stated she would send these instructions to her in the mail and call to

 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


Counseled pt/family regarding: lab results, diagnosis, need for follow-up, rad 

results





- Departure


Departure Disposition: Home


Clinical Impression: 


 Chest wall contusion, Hematoma of scalp, Fall, CKD (chronic kidney disease) 

requiring chronic dialysis





Condition: Stable


Critical Care Time: No


Referrals: 


MARLEEN RIVAS MD [Primary Care Provider] - 


Instructions:  Contusion (DC), Preventing Falls


Additional Instructions: 


Please decrease your gabapentin to 400 mg twic a day as this could be 

contributing to your somnolence in addition to falls.  Also please refrain from 

smoking marijuana if you are able to.  Please go to dialysis as scheduled 

tomorrow morning.  Take Tylenol as needed for pain.  You can take this 

medication and purchase it over-the-counter.  Please take this medication as 

instructed on the medication bottle.

## 2020-08-23 NOTE — XRAY
Indication: Status post fall.  Current blood thinner therapy.



Multiple contiguous axial images obtained through the head without contrast.



Comparison: January 2, 2020.



Stable global atrophy, moderate periventricular degenerative micro-ischemia,

and high right parietal lobe infarct.  Again no acute intracranial hemorrhage,

hydrocephalus, or mass effect.  Fourth ventricle is midline.  New tiny left

occipital scalp hematoma.  Bony calvarium intact.  Visualized paranasal

sinuses and mastoid air cells are clear.



Impression: New left occipital scalp hematoma without fracture.  Stable

diffuse senile brain with old right parietal infarct.



Comment: Preliminary interpretation was made by [facility].  No critical discrepancy.

## 2020-08-23 NOTE — XRAY
Indication: Status post fall.  Chest pain.  Current blood thinner therapy.



Multiple contiguous axial images obtained through the chest using 80 cc

Isovue-370 contrast and PE protocol.



Comparison:.  CT chest without contrast August 9, 2018.



There is good opacification of the pulmonary arteries to include the lobar and

segmental branches.  No pulmonary embolus.  Heart is enlarged.  Aorta is

mildly arteriosclerotic without aneurysm/dissection.  Right large bore

dialysis catheter.  A few small bilateral hilar calcified nodes.  No

pathologic mediastinal/hilar lymphadenopathy.



Lungs demonstrates bilateral dependent atelectasis.  Tiny left lower and right

lower lobe calcified granuloma.  No suspicious pulmonary mass, infiltrate,

effusion, or pneumothorax.



Bony thorax intact with minimal multilevel degenerative spondylosis, T10

vertebral hemangioma, and T11 Schmorl node.



CT abdomen/pelvis reported separately.



Impression:

1.  Negative pulmonary embolus.

2.  Cardiomegaly without CHF.

3.  Incidental chronic bony findings, right dialysis catheter, and old

granulomatous disease.



Comment: Preliminary interpretation was made by [facility].  No critical discrepancy.

## 2020-08-23 NOTE — XRAY
Indication: Status post fall.  Current blood thinner therapy.



Multiple contiguous axial images obtained through the cervical spine.

Sagittal and coronal reformatted images obtained.



Comparison: January 2, 2020.



Axial images again negative for acute fracture, suspicious bony lesions, or

spinal canal stenosis.  Stable mild multilevel bilateral degenerative facet

hypertrophy.  Sagittal and coronal reformatted images demonstrates normal

alignment with vertebral body heights/disc spaces maintained.  Again no acute

compression fracture, subluxation, or jumped facet.  Normal appearing

craniocervical junction.



Visualized noncontrasted soft tissues again demonstrates mild carotid

calcifications bilaterally and benign chunky right thyroid calcification.  New

large bore right dialysis catheter.



CT head and CT chest reported separately.



Impression:

1.  Again negative acute fracture/subluxation.

2.  Stable multilevel bilateral degenerative facet hypertrophy and benign

right thyroid calcification.



Comment: Preliminary interpretation was made by [facility].  No critical discrepancy.

## 2020-08-23 NOTE — XRAY
Indication: Pain following fall.  Current blood thinner therapy.



Multiple contiguous axial images obtained through the abdomen and pelvis using

80 cc Isovue 370 contrast.



Comparison: August 9, 2018.



CT chest reported separately.



Noncontrasted stomach and bowel loops remain nonobstructed.  No free

fluid/air.  Both kidneys enhance and excrete with stable bilateral renal

cysts.  Stable calcified splenic granuloma and hysterectomy.



Remaining liver, gallbladder, pancreas, spleen, adrenal glands, kidneys,

ureters, and bladder appear unremarkable.  There is again moderate scattered

aortoiliac calcifications without AAA.



Osseous structures intact.



Impression:

1.  Stable bilateral renal cysts.

2.  Remaining CT abdomen/pelvis with contrast the exam is negative.



Comment: Preliminary interpretation was made by [facility].  No critical discrepancy.

## 2020-12-06 ENCOUNTER — HOSPITAL ENCOUNTER (EMERGENCY)
Dept: HOSPITAL 33 - ED | Age: 60
Discharge: HOME | End: 2020-12-06
Payer: MEDICARE

## 2020-12-06 VITALS — DIASTOLIC BLOOD PRESSURE: 63 MMHG | HEART RATE: 79 BPM | SYSTOLIC BLOOD PRESSURE: 108 MMHG

## 2020-12-06 VITALS — OXYGEN SATURATION: 98 %

## 2020-12-06 DIAGNOSIS — I50.9: ICD-10-CM

## 2020-12-06 DIAGNOSIS — E78.5: ICD-10-CM

## 2020-12-06 DIAGNOSIS — Z79.899: ICD-10-CM

## 2020-12-06 DIAGNOSIS — R07.89: Primary | ICD-10-CM

## 2020-12-06 DIAGNOSIS — Z99.2: ICD-10-CM

## 2020-12-06 DIAGNOSIS — I12.0: ICD-10-CM

## 2020-12-06 DIAGNOSIS — I25.2: ICD-10-CM

## 2020-12-06 DIAGNOSIS — I25.810: ICD-10-CM

## 2020-12-06 DIAGNOSIS — N18.6: ICD-10-CM

## 2020-12-06 DIAGNOSIS — K29.70: ICD-10-CM

## 2020-12-06 LAB
ALBUMIN SERPL-MCNC: 4.4 G/DL (ref 3.5–5)
ALP SERPL-CCNC: 124 U/L (ref 38–126)
ALT SERPL-CCNC: 11 U/L (ref 0–35)
ANION GAP SERPL CALC-SCNC: 14.6 MEQ/L (ref 5–15)
AST SERPL QL: 18 U/L (ref 14–36)
BASOPHILS # BLD AUTO: 0.02 10*3/UL (ref 0–0.4)
BASOPHILS NFR BLD AUTO: 0.2 % (ref 0–0.4)
BILIRUB BLD-MCNC: 0.3 MG/DL (ref 0.2–1.3)
BUN SERPL-MCNC: 61 MG/DL (ref 7–17)
CALCIUM SPEC-MCNC: 9.6 MG/DL (ref 8.4–10.2)
CHLORIDE SERPL-SCNC: 102 MMOL/L (ref 98–107)
CO2 SERPL-SCNC: 25 MMOL/L (ref 22–30)
CREAT SERPL-MCNC: 5.29 MG/DL (ref 0.52–1.04)
EOSINOPHIL # BLD AUTO: 0.42 10*3/UL (ref 0–0.5)
GFR SERPLBLD BASED ON 1.73 SQ M-ARVRAT: 8.8 ML/MIN
GLUCOSE SERPL-MCNC: 114 MG/DL (ref 74–106)
HCT VFR BLD AUTO: 33.6 % (ref 35–47)
HGB BLD-MCNC: 10.5 GM/DL (ref 12–16)
LYMPHOCYTES # SPEC AUTO: 2.61 10*3/UL (ref 1–4.6)
MCH RBC QN AUTO: 31.3 PG (ref 26–32)
MCHC RBC AUTO-ENTMCNC: 31.3 G/DL (ref 32–36)
MONOCYTES # BLD AUTO: 0.67 10*3/UL (ref 0–1.3)
PLATELET # BLD AUTO: 108 K/MM3 (ref 150–450)
POTASSIUM SERPLBLD-SCNC: 4.8 MMOL/L (ref 3.5–5.1)
PROT SERPL-MCNC: 7.8 G/DL (ref 6.3–8.2)
RBC # BLD AUTO: 3.35 M/MM3 (ref 4.1–5.4)
SODIUM SERPL-SCNC: 137 MMOL/L (ref 137–145)
WBC # BLD AUTO: 10.9 K/MM3 (ref 4–10.5)

## 2020-12-06 PROCEDURE — 85025 COMPLETE CBC W/AUTO DIFF WBC: CPT

## 2020-12-06 PROCEDURE — 80053 COMPREHEN METABOLIC PANEL: CPT

## 2020-12-06 PROCEDURE — 96375 TX/PRO/DX INJ NEW DRUG ADDON: CPT

## 2020-12-06 PROCEDURE — 93041 RHYTHM ECG TRACING: CPT

## 2020-12-06 PROCEDURE — 36415 COLL VENOUS BLD VENIPUNCTURE: CPT

## 2020-12-06 PROCEDURE — 93005 ELECTROCARDIOGRAM TRACING: CPT

## 2020-12-06 PROCEDURE — 99284 EMERGENCY DEPT VISIT MOD MDM: CPT

## 2020-12-06 PROCEDURE — 71045 X-RAY EXAM CHEST 1 VIEW: CPT

## 2020-12-06 PROCEDURE — 36000 PLACE NEEDLE IN VEIN: CPT

## 2020-12-06 PROCEDURE — 84484 ASSAY OF TROPONIN QUANT: CPT

## 2020-12-06 PROCEDURE — 96374 THER/PROPH/DIAG INJ IV PUSH: CPT

## 2020-12-06 PROCEDURE — 83880 ASSAY OF NATRIURETIC PEPTIDE: CPT

## 2020-12-06 NOTE — ERPHSYRPT
<IGNACIA OCHOA - Last Filed: 12/06/20 18:52>





- History of Present Illness


Time Seen by Provider: 12/06/20 18:10


Historian: patient


Exam Limitations: no limitations


Patient Subjective Stated Complaint: Chest pain


Triage Nursing Assessment: Patient brought back to ED via w/c and transferred to

bed with assist of 2. Patient A+O X3. Patient's skin pink, warm and dry. Patient

complains of chest pain that started one hour ago after eating 20 soft 

peppermints. Patient complains of constant sharp pain in the chest 10/10. Lungs 

clear a/p kailyn. Heart tones audible. Slight swelling noted to BLE.


Physician History: 





60 years old female with history of hypertension, hyperlipidemia, coronary 

artery disease status post stenting, congestive heart failure, end-stage renal 

disease on dialysis three times a week presented in the ER with chief complaint 

of sudden onset substernal/left-sided chest pain almost an hour prior to arrival

after she had 20 soft peppermint.  Patient described this as a sharp burning 

sensation moderate to severe intensity without any significant aggravating or 

relieving factors.  Denies any radiation of pain.  Denies associated 

palpitations or shortness of breath.  It started to improve on its own and 

currently on my evaluation rates 5/10 intensity.  Denies any recent fever chills

or cough.  No sick contact.  Last dialysis was 2 days ago.


Timing/Duration: hour(s) (1), sudden, worse


Activities at Onset: rest


Quality: burning, sharpness


Location: substernal


Chest Pain Radiation: no radiation


Severity of Pain-Max: severe


Severity of Pain-Current: moderate


Modifying Factors: Improves With: nothing


Associated Symptoms: denies symptoms, heartburn, No shortness of breath, No 

cough, No hurts to breathe


Nitro Today/Relief: no nitro taken today


Aspirin Treatment Today: unknown


Allergies/Adverse Reactions: 








amoxicillin Allergy (Verified 12/06/20 18:39)


   


   unknown reaction 


oxytetracycline [From Terramycin] Allergy (Verified 12/06/20 18:39)


   


oxytetracycline HCl [From Terramycin] Allergy (Verified 12/06/20 18:39)


   


   unknown reaction 


Penicillins Allergy (Verified 12/06/20 18:39)


   


   unknown reaction 





Home Medications: 








Bumetanide 2 mg PO DAILY 11/01/19 [History]


Carvedilol 3.125 mg*** [Coreg 3.125 MG***] 3.125 mg PO BID 11/01/19 [History]


Cholecalciferol (Vitamin D3) [Vitamin D3] 1,000 unit PO DAILY 11/01/19 [History]


Gabapentin 400 mg PO TID 11/01/19 [History]


Isosorbide Mononitrate [Isosorbide Mononitrate ER] 30 mg PO DAILY 11/01/19 

[History]


Nitroglycerin 0.4 mg Tablet*** [Nitrostat 0.4 MG Tablet***] 0.4 mg SL Q5MIN PRN 

MR X 3 PRN 11/01/19 [History]


PANTOPRAZOLE 40 mg Tablet*** [Protonix 40MG Tablet***] 40 mg PO QAM 11/01/19 [

History]


Pramipexole Di-HCl [Mirapex] 1 mg PO BID 11/01/19 [History]


Rosuvastatin Calcium 20 mg PO HS 11/01/19 [History]


Warfarin Sodium 5 mg*** [Coumadin 5 MG***] 5 mg PO DAILY 11/01/19 [History]


calcitrioL [Calcitriol] 0.25 mcg PO DAILY 11/01/19 [History]


Allopurinol 100 mg*** [Zyloprim 100 mg***] 100 mg PO DAILY 01/02/20 [History]


Cyclobenzaprine HCl 10 mg*** [Cyclobenzaprine 10 MG***] 10 mg PO TID 01/02/20 

[History]


buPROPion HCl [Bupropion HCl Sr] 200 mg PO BID 01/02/20 [History]


Aspirin [Aspirin EC] 1 tab PO DAILY 03/11/20 [History]


Enoxaparin Sodium 80 mg SPINAL Q12H 03/11/20 [History]





Hx Tetanus, Diphtheria Vaccination/Date Given: Yes


Hx Influenza Vaccination/Date Given: Yes


Hx Pneumococcal Vaccination/Date Given: Yes


Immunizations Up to Date: Yes





Travel Risk





- International Travel


Have you traveled outside of the country in past 3 weeks: No





- Coronavirus Screening


Are you exhibiting any of the following symptoms?: No


Close contact with a COVID-19 positive Pt in past 14-21 Days: No





- Review of Systems


Constitutional: No Symptoms


Eyes: No Symptoms


Ears, Nose, & Throat: No Symptoms


Respiratory: No Symptoms


Cardiac: Chest Pain


Abdominal/Gastrointestinal: No Symptoms


Genitourinary Symptoms: No Symptoms


Musculoskeletal: Arthralgias


Skin: No Symptoms


Neurological: No Symptoms


Psychological: No Symptoms


Endocrine: No Symptoms


Hematologic/Lymphatic: No Symptoms


Immunological/Allergic: No Symptoms





- Past Medical History


Pertinent Past Medical History: Yes


Neurological History: Stroke, TIA


ENT History: No Pertinent History


Cardiac History: Aneurysm, Congestive Heart Failure, Hypertension, Myocardial 

Infarction (MI)


Respiratory History: CHF, Pneumonia


Endocrine Medical History: No Pertinent History


Musculoskeletal History: Osteoporosis


GI Medical History: GERD


 History: No Pertinent History


Psycho-Social History: Anxiety, Depression


Female Reproductive Disorders: Endometriosis


Other Medical History: LUPUS ANTICOAGULANT DISORDER, MORBID OBESITY, GOUT, 

ANXIETY, DEPRESSION, VENTRICULAR ARRHYTHMIA, CKD STAGE 4, OSTEOPOROSIS, 

HYPERGLYCEMIA, FRACTURE PATELLA 2016, dialysis





- Past Surgical History


Past Surgical History: Yes


Neuro Surgical History: No Pertinent History


Cardiac: Cardiac Stent


Respiratory: No Pertinent History


Gastrointestinal: No Pertinent History


Genitourinary: No Pertinent History


Musculoskeletal: No Pertinent History


Female Surgical History: Hysterectomy


Other Surgical History: CARPAL TUNNEL-RIGHT WRIST, RIGHT FOOT





- Social History


Smoking Status: Current every day smoker


How long have you smoked: years


Exposure to second hand smoke: No


Drug Use: marijuana


Patient Lives Alone: Yes





- Physical Exam


General Appearance: no apparent distress


Eye Exam: PERRL/EOMI, eyes nml inspection


Ears, Nose, Throat Exam: normal ENT inspection, pharynx normal


Neck Exam: normal inspection, non-tender, supple, full range of motion


Respiratory Exam: normal breath sounds, lungs clear, No chest tenderness


Cardiovascular Exam: regular rate/rhythm, normal heart sounds


Gastrointestinal/Abdomen Exam: soft, normal bowel sounds, No tenderness


Back Exam: normal inspection


Extremity Exam: normal inspection, normal range of motion, pelvis stable, pedal 

edema


Neurologic Exam: alert, oriented x 3, cooperative


Skin Exam: normal color


**SpO2 Interpretation**: normal


SpO2: 96


O2 Delivery: Room Air





- Course


EKG Interpreted by Me: RATE (94), Sinus Rhythm, NORMAL AXIS, NORMAL INTERVALS, 

Other (ST depression in lateral leads)





- Progress


Progress: improved


Air Movement: good


Progress Note: 





12/06/20 18:54 she is given aspirin along with morphine and GI cocktail, pain is

 better on reevaluation.  EKG showed ST depression in lateral leads.  Chest x-

ray showed cardiomegaly with no focal infiltrative process reviewed by me, 

official report is pending.  Will risk factors for CAD, work-up is pending, care

 is transferred to Dr. Deshpande at shift change for final disposition.














- Departure


Clinical Impression: 


 Non-cardiac chest pain, Gastritis, Renal failure





Condition: Stable


Referrals: 


DOCTOR,NO FAMILY [Primary Care Provider] - 


Additional Instructions: 


Keep your dialysis appointment for tomorrow morning.  Call Dr. Macias's office 

tomorrow to make arrangements for follow-up appointment.  Return to the 

emergency room if symptoms recur.





<ARI DESHPANDE - Last Filed: 12/06/20 22:40>





- Nursing Vital Signs


Nursing Vital Signs: 





                               Initial Vital Signs











Temperature  98.0 F   12/06/20 18:11


 


Pulse Rate  96 H  12/06/20 18:11


 


Respiratory Rate  23   12/06/20 18:11


 


Blood Pressure  140/67   12/06/20 18:11


 


O2 Sat by Pulse Oximetry  96   12/06/20 18:11








                                   Pain Scale











Pain Intensity                 4














Ordered Tests: 





                               Active Orders 24 hr











 Category Date Time Status


 


 Cardiac Monitor STAT Care  12/06/20 18:20 Active


 


 EKG-ER Only STAT Care  12/06/20 18:19 Active


 


 IV Insertion STAT Care  12/06/20 18:19 Active


 


 Oxygen-ED Only Nasal Cannula 2 lpm Care  12/06/20 18:19 Active


 


 CHEST 1 VIEW (PORTABLE) Stat Exams  12/06/20 18:19 Taken


 


 CBC W DIFF Stat Lab  12/06/20 18:30 Completed


 


 CMP Stat Lab  12/06/20 18:30 Completed


 


 NT PRO BNP Stat Lab  12/06/20 18:30 Completed


 


 TROPONIN Q3H Lab  12/06/20 18:30 Completed


 


 TROPONIN Q3H Lab  12/06/20 21:45 Completed


 


 TROPONIN Q3H Lab  12/07/20 00:30 Ordered


 


 TROPONIN Q3H Lab  12/07/20 03:30 Ordered


 


 TROPONIN Q3H Lab  12/07/20 06:30 Ordered








Medication Summary














Discontinued Medications














Generic Name Dose Route Start Last Admin





  Trade Name Freq  PRN Reason Stop Dose Admin


 


Al Hydrox/Mg Hydrox/Simethicone  Confirm  12/06/20 18:27 





  Maalox Es 30 Ml Unit Dose***  Administered  12/06/20 18:28 





  Dose  





  30 ml  





  .ROUTE  





  .STK-MED ONE  


 


Aspirin  324 mg  12/06/20 18:19  12/06/20 18:24





  Baby Aspirin 81 Mg Chew***  PO  12/06/20 18:20  324 mg





  STAT ONE   Administration


 


Lidocaine HCl  Confirm  12/06/20 18:27 





  Xylocaine Hcl Viscous *  Administered  12/06/20 18:28 





  Dose  





  15 ml  





  .ROUTE  





  .STK-MED ONE  


 


Magnesium Hydroxide  45 ml  12/06/20 18:59  12/06/20 19:00





  Gi Cocktail 45 Ml (Maalox/Lidocaine)  PO  12/06/20 19:00  45 ml





  STAT ONE   Administration


 


Morphine Sulfate  4 mg  12/06/20 18:19  12/06/20 18:30





  Morphine Sulfate 4 Mg Inj***  IV  12/06/20 18:20  4 mg





  STAT ONE   Administration


 


Morphine Sulfate  Confirm  12/06/20 18:26 





  Morphine Sulfate 4 Mg Inj***  Administered  12/06/20 18:27 





  Dose  





  4 mg  





  .ROUTE  





  .STK-MED ONE  


 


Ondansetron HCl  4 mg  12/06/20 18:19  12/06/20 18:31





  Zofran 4 Mg/2 Ml Vial**  IV  12/06/20 18:20  4 mg





  STAT ONE   Administration


 


Ondansetron HCl  Confirm  12/06/20 18:26 





  Zofran 4 Mg/2 Ml Vial**  Administered  12/06/20 18:27 





  Dose  





  4 mg  





  .ROUTE  





  .STK-MED ONE  











Lab/Rad Data: 





                           Laboratory Result Diagrams





                                 12/06/20 18:30 





                                 12/06/20 18:30 





                               Laboratory Results











  12/06/20 12/06/20 12/06/20 Range/Units





  21:45 18:30 18:30 


 


WBC     (4.0-10.5)  K/mm3


 


RBC     (4.1-5.4)  M/mm3


 


Hgb     (12.0-16.0)  gm/dl


 


Hct     (35-47)  %


 


MCV     ()  fl


 


MCH     (26-32)  pg


 


MCHC     (32-36)  g/dl


 


RDW     (11.5-14.0)  %


 


Plt Count     (150-450)  K/mm3


 


MPV     (7.5-11.0)  fl


 


Gran %     (36.0-66.0)  %


 


Eos # (Auto)     (0-0.5)  


 


Absolute Lymphs (auto)     (1.0-4.6)  


 


Absolute Monos (auto)     (0.0-1.3)  


 


Lymphocytes %     (24.0-44.0)  %


 


Monocytes %     (0.0-12.0)  %


 


Eosinophils %     (0.00-5.0)  %


 


Basophils %     (0.0-0.4)  %


 


Absolute Granulocytes     (1.4-6.9)  


 


Basophils #     (0-0.4)  


 


Sodium    137  (137-145)  mmol/L


 


Potassium    4.8  (3.5-5.1)  mmol/L


 


Chloride    102  ()  mmol/L


 


Carbon Dioxide    25  (22-30)  mmol/L


 


Anion Gap    14.6  (5-15)  MEQ/L


 


BUN    61 H  (7-17)  mg/dL


 


Creatinine    5.29 H  (0.52-1.04)  mg/dL


 


Estimated GFR    8.8  ML/MIN


 


Glucose    114 H  ()  mg/dL


 


Calcium    9.6  (8.4-10.2)  mg/dL


 


Total Bilirubin    0.30  (0.2-1.3)  mg/dL


 


AST    18  (14-36)  U/L


 


ALT    11  (0-35)  U/L


 


Alkaline Phosphatase    124  ()  U/L


 


Troponin I  0.028  0.027   (0.000-0.034)  ng/mL


 


NT-Pro-B Natriuret Pep    89586 H  (0-900)  pg/mL


 


Serum Total Protein    7.8  (6.3-8.2)  g/dL


 


Albumin    4.4  (3.5-5.0)  g/dL














  12/06/20 Range/Units





  18:30 


 


WBC  10.9 H  (4.0-10.5)  K/mm3


 


RBC  3.35 L  (4.1-5.4)  M/mm3


 


Hgb  10.5 L  (12.0-16.0)  gm/dl


 


Hct  33.6 L  (35-47)  %


 


MCV  100.3 H  ()  fl


 


MCH  31.3  (26-32)  pg


 


MCHC  31.3 L  (32-36)  g/dl


 


RDW  14.0  (11.5-14.0)  %


 


Plt Count  108 L  (150-450)  K/mm3


 


MPV  10.9  (7.5-11.0)  fl


 


Gran %  66.0  (36.0-66.0)  %


 


Eos # (Auto)  0.42  (0-0.5)  


 


Absolute Lymphs (auto)  2.61  (1.0-4.6)  


 


Absolute Monos (auto)  0.67  (0.0-1.3)  


 


Lymphocytes %  23.9 L  (24.0-44.0)  %


 


Monocytes %  6.1  (0.0-12.0)  %


 


Eosinophils %  3.8  (0.00-5.0)  %


 


Basophils %  0.2  (0.0-0.4)  %


 


Absolute Granulocytes  7.21 H  (1.4-6.9)  


 


Basophils #  0.02  (0-0.4)  


 


Sodium   (137-145)  mmol/L


 


Potassium   (3.5-5.1)  mmol/L


 


Chloride   ()  mmol/L


 


Carbon Dioxide   (22-30)  mmol/L


 


Anion Gap   (5-15)  MEQ/L


 


BUN   (7-17)  mg/dL


 


Creatinine   (0.52-1.04)  mg/dL


 


Estimated GFR   ML/MIN


 


Glucose   ()  mg/dL


 


Calcium   (8.4-10.2)  mg/dL


 


Total Bilirubin   (0.2-1.3)  mg/dL


 


AST   (14-36)  U/L


 


ALT   (0-35)  U/L


 


Alkaline Phosphatase   ()  U/L


 


Troponin I   (0.000-0.034)  ng/mL


 


NT-Pro-B Natriuret Pep   (0-900)  pg/mL


 


Serum Total Protein   (6.3-8.2)  g/dL


 


Albumin   (3.5-5.0)  g/dL














- Progress


Progress: re-examined


Progress Note: 





12/06/20 22:35


Medical decision making: This patient has not had chest pain since I took over 

care of her at 7 PM.  I have checked on her a few times.  Her vital signs are 

stable.  Patient has renal failure and is on dialysis.  She is due for dialysis 

session tomorrow.  Patient's troponins x2 are both normal.  Patient no longer 

has chest pain or shortness of breath.  Patient states that she is tired and 

wants to go to bed in her own bed.  Her renal failure likely elevates her 

troponin levels.  In review of old troponin levels, the levels today are very 

low.  Patient likely had gastritis after eating 20 peppermint candies in 1 

sitting.  There are no no acute findings on the patient's chest x-ray or lab 

work other than the elevated creatinine but she is due for dialysis tomorrow.  

We will discharge her to home.


Blood Culture(s) Obtained: No


Antibiotics given: No


Counseled pt/family regarding: lab results, diagnosis, need for follow-up, rad 

results





- Departure


Departure Disposition: Home


Critical Care Time: No

## 2020-12-07 NOTE — XRAY
Indication: Chest pain.



Comparison: March 16, 2020.



Portable chest again demonstrates chronic bibasilar discoid

atelectasis/scarring, scattered tiny calcified granulomas, and cardiomegaly.

New right double-lumen dialysis catheter without complications.  No focal

infiltrate, consolidation, or large effusion.  Bony thorax intact again with

osteopenia and degenerative changes.



Impression: Nonacute chest with chronic features.

## 2020-12-09 ENCOUNTER — HOSPITAL ENCOUNTER (EMERGENCY)
Dept: HOSPITAL 33 - ED | Age: 60
LOS: 1 days | Discharge: HOME | End: 2020-12-10
Payer: MEDICARE

## 2020-12-09 VITALS — OXYGEN SATURATION: 99 %

## 2020-12-09 DIAGNOSIS — K21.9: ICD-10-CM

## 2020-12-09 DIAGNOSIS — I25.2: ICD-10-CM

## 2020-12-09 DIAGNOSIS — M81.0: ICD-10-CM

## 2020-12-09 DIAGNOSIS — I12.9: ICD-10-CM

## 2020-12-09 DIAGNOSIS — I50.9: ICD-10-CM

## 2020-12-09 DIAGNOSIS — M79.606: Primary | ICD-10-CM

## 2020-12-09 DIAGNOSIS — N80.9: ICD-10-CM

## 2020-12-09 DIAGNOSIS — N18.4: ICD-10-CM

## 2020-12-09 DIAGNOSIS — F41.9: ICD-10-CM

## 2020-12-09 LAB
ALBUMIN SERPL-MCNC: 4.1 G/DL (ref 3.5–5)
ALP SERPL-CCNC: 82 U/L (ref 38–126)
ALT SERPL-CCNC: 14 U/L (ref 0–35)
ANION GAP SERPL CALC-SCNC: 11.7 MEQ/L (ref 5–15)
AST SERPL QL: 23 U/L (ref 14–36)
BASOPHILS # BLD AUTO: 0.03 10*3/UL (ref 0–0.4)
BASOPHILS NFR BLD AUTO: 0.3 % (ref 0–0.4)
BILIRUB BLD-MCNC: 0.5 MG/DL (ref 0.2–1.3)
BUN SERPL-MCNC: 19 MG/DL (ref 7–17)
CALCIUM SPEC-MCNC: 9.5 MG/DL (ref 8.4–10.2)
CHLORIDE SERPL-SCNC: 96 MMOL/L (ref 98–107)
CO2 SERPL-SCNC: 31 MMOL/L (ref 22–30)
CREAT SERPL-MCNC: 2.92 MG/DL (ref 0.52–1.04)
EOSINOPHIL # BLD AUTO: 0.11 10*3/UL (ref 0–0.5)
FLUBV AG SPEC QL IA: NEGATIVE
GFR SERPLBLD BASED ON 1.73 SQ M-ARVRAT: 17.5 ML/MIN
GLUCOSE SERPL-MCNC: 113 MG/DL (ref 74–106)
HCT VFR BLD AUTO: 32.8 % (ref 35–47)
HGB BLD-MCNC: 10.2 GM/DL (ref 12–16)
INFLUENZA A: NEGATIVE
INR PPP: 1.38 (ref 0.8–3)
LYMPHOCYTES # SPEC AUTO: 2.39 10*3/UL (ref 1–4.6)
MAGNESIUM SERPL-MCNC: 2.2 MG/DL (ref 1.6–2.3)
MCH RBC QN AUTO: 30.9 PG (ref 26–32)
MCHC RBC AUTO-ENTMCNC: 31.1 G/DL (ref 32–36)
MONOCYTES # BLD AUTO: 0.81 10*3/UL (ref 0–1.3)
PLATELET # BLD AUTO: 33 K/MM3 (ref 150–450)
POTASSIUM SERPLBLD-SCNC: 4.5 MMOL/L (ref 3.5–5.1)
PROT SERPL-MCNC: 7.4 G/DL (ref 6.3–8.2)
PROTHROMBIN TIME: 15.6 SECONDS (ref 9.95–12.35)
RBC # BLD AUTO: 3.3 M/MM3 (ref 4.1–5.4)
SODIUM SERPL-SCNC: 135 MMOL/L (ref 137–145)
WBC # BLD AUTO: 9.9 K/MM3 (ref 4–10.5)

## 2020-12-09 PROCEDURE — 99284 EMERGENCY DEPT VISIT MOD MDM: CPT

## 2020-12-09 PROCEDURE — 80053 COMPREHEN METABOLIC PANEL: CPT

## 2020-12-09 PROCEDURE — 83735 ASSAY OF MAGNESIUM: CPT

## 2020-12-09 PROCEDURE — 85025 COMPLETE CBC W/AUTO DIFF WBC: CPT

## 2020-12-09 PROCEDURE — 36415 COLL VENOUS BLD VENIPUNCTURE: CPT

## 2020-12-09 PROCEDURE — 96374 THER/PROPH/DIAG INJ IV PUSH: CPT

## 2020-12-09 PROCEDURE — 87400 INFLUENZA A/B EACH AG IA: CPT

## 2020-12-09 PROCEDURE — 85610 PROTHROMBIN TIME: CPT

## 2020-12-09 PROCEDURE — 36000 PLACE NEEDLE IN VEIN: CPT

## 2020-12-09 PROCEDURE — 86308 HETEROPHILE ANTIBODY SCREEN: CPT

## 2020-12-09 NOTE — ERPHSYRPT
- History of Present Illness


Time Seen by Provider: 12/09/20 22:18


Source: patient, EMS


Exam Limitations: clinical condition


Physician History: 





A 60-year-old white female who is extremely noncompliant.  She presents with 

bilateral leg pain.  Patient has chronic renal failure and undergoes dialysis 

Monday Wednesday Friday.  She had a dialysis session this morning.  Patient 

states she does not have a primary care physician any longer.  Patient states 

that she stopped her medication.  Patient specifically states that she is having

leg pain and cramping and it was present even before her dialysis today.  She 

has not been taking her gabapentin or her Mirapex.  Patient was here on 

12/6/2020 for chest pain.  She ruled out for any cardiac issues.  Patient has no

specific chest pain.  Her primary complaint today is that she is having an 

exacerbation of her restless leg syndrome because she has not been on her 

gabapentin and Mirapex.  Patient is not short of breath.  Patient did not have 

trauma to her lower extremities.  Patient was brought in by the ambulance.  Armin rosa states that if she does not get her pain medicine she is going to walk out

of the emergency department.


Quality: burning, stabbing


Severity of Pain-Max: moderate


Severity of Pain-Current: moderate


Lower Extremities Pain: leg: bilateral, knee: bilateral


Associated Symptoms: none


Allergies/Adverse Reactions: 








amoxicillin Allergy (Verified 12/09/20 22:08)


   


   unknown reaction 


oxytetracycline [From Terramycin] Allergy (Verified 12/09/20 22:08)


   


oxytetracycline HCl [From Terramycin] Allergy (Verified 12/09/20 22:08)


   


   unknown reaction 


Penicillins Allergy (Verified 12/09/20 22:08)


   


   unknown reaction 





Home Medications: 








Bumetanide 2 mg PO DAILY 11/01/19 [History]


Carvedilol 3.125 mg*** [Coreg 3.125 MG***] 3.125 mg PO BID 11/01/19 [History]


Cholecalciferol (Vitamin D3) [Vitamin D3] 1,000 unit PO DAILY 11/01/19 [History]


Gabapentin 400 mg PO TID 11/01/19 [History]


Isosorbide Mononitrate [Isosorbide Mononitrate ER] 30 mg PO DAILY 11/01/19 

[History]


Nitroglycerin 0.4 mg Tablet*** [Nitrostat 0.4 MG Tablet***] 0.4 mg SL Q5MIN PRN 

MR X 3 PRN 11/01/19 [History]


PANTOPRAZOLE 40 mg Tablet*** [Protonix 40MG Tablet***] 40 mg PO QAM 11/01/19 

[History]


Pramipexole Di-HCl [Mirapex] 1 mg PO BID 11/01/19 [History]


Rosuvastatin Calcium 20 mg PO HS 11/01/19 [History]


Warfarin Sodium 5 mg*** [Coumadin 5 MG***] 5 mg PO DAILY 11/01/19 [History]


calcitrioL [Calcitriol] 0.25 mcg PO DAILY 11/01/19 [History]


Allopurinol 100 mg*** [Zyloprim 100 mg***] 100 mg PO DAILY 01/02/20 [History]


Cyclobenzaprine HCl 10 mg*** [Cyclobenzaprine 10 MG***] 10 mg PO TID 01/02/20 

[History]


buPROPion HCl [Bupropion HCl Sr] 200 mg PO BID 01/02/20 [History]


Aspirin [Aspirin EC] 1 tab PO DAILY 03/11/20 [History]


Enoxaparin Sodium 80 mg SPINAL Q12H 03/11/20 [History]





Hx Tetanus, Diphtheria Vaccination/Date Given: Yes


Hx Influenza Vaccination/Date Given: Yes


Hx Pneumococcal Vaccination/Date Given: Yes





Travel Risk





- International Travel


Have you traveled outside of the country in past 3 weeks: No





- Coronavirus Screening


Are you exhibiting any of the following symptoms?: No


Close contact with a COVID-19 positive Pt in past 14-21 Days: No





- Review of Systems


Constitutional: No Symptoms


Eyes: No Symptoms


Ears, Nose, & Throat: No Symptoms


Respiratory: No Symptoms


Cardiac: No Symptoms


Abdominal/Gastrointestinal: No Symptoms


Genitourinary Symptoms: No Symptoms


Musculoskeletal: Other (Bilateral lower extremity pain like her restless leg 

syndrome)


Skin: No Symptoms


Neurological: No Symptoms


Psychological: No Symptoms


Endocrine: No Symptoms


Hematologic/Lymphatic: No Symptoms


Immunological/Allergic: No Symptoms


All Other Systems: Reviewed and Negative





- Past Medical History


Pertinent Past Medical History: Yes


Neurological History: Stroke, TIA


ENT History: No Pertinent History


Cardiac History: Aneurysm, Congestive Heart Failure, Hypertension, Myocardial 

Infarction (MI)


Respiratory History: CHF, Pneumonia


Endocrine Medical History: No Pertinent History


Musculoskeletal History: Osteoporosis


GI Medical History: GERD


 History: No Pertinent History


Psycho-Social History: Anxiety, Depression


Female Reproductive Disorders: Endometriosis


Other Medical History: LUPUS ANTICOAGULANT DISORDER, MORBID OBESITY, GOUT, 

ANXIETY, DEPRESSION, VENTRICULAR ARRHYTHMIA, CKD STAGE 4, OSTEOPOROSIS, 

HYPERGLYCEMIA, FRACTURE PATELLA 2016, dialysis





- Past Surgical History


Past Surgical History: Yes


Neuro Surgical History: No Pertinent History


Cardiac: Cardiac Stent


Respiratory: No Pertinent History


Gastrointestinal: No Pertinent History


Genitourinary: No Pertinent History


Musculoskeletal: No Pertinent History


Female Surgical History: Hysterectomy


Other Surgical History: CARPAL TUNNEL-RIGHT WRIST, RIGHT FOOT





- Social History


Smoking Status: Current every day smoker


How long have you smoked: years


Exposure to second hand smoke: No


Drug Use: marijuana


Patient Lives Alone: Yes





- Nursing Vital Signs


Nursing Vital Signs: 


                               Initial Vital Signs











Temperature  98.8 F   12/09/20 22:04


 


Pulse Rate  130 H  12/09/20 22:04


 


Respiratory Rate  22   12/09/20 22:04


 


Blood Pressure  130/90   12/09/20 22:04


 


O2 Sat by Pulse Oximetry  99   12/09/20 22:04








                                   Pain Scale











Pain Intensity                 10

















- Physical Exam


General Appearance: no apparent distress, alert, anxiety


Eyes, Ears, Nose, Throat Exam: normal ENT inspection, moist mucous membranes


Neck Exam: normal inspection, non-tender, supple, full range of motion


Cardiovascular/Respiratory Exam: chest non-tender, no respiratory distress


Gastrointestinal/Abdominal Exam: non-tender


Back Exam: normal inspection, normal range of motion, No CVA tenderness, No 

vertebral tenderness


Hips Exam: bilateral: non-tender, normal inspection, normal range of motion, no 

evidence of injury


Legs Exam: bilateral leg: normal inspection, normal range of motion, no evidence

 of injury, pain (This like her restless leg syndrome)


Knees Exam: bilateral knee: non-tender, normal inspection, normal range of 

motion, no evidence of injury


Ankle Exam: bilateral ankle: non-tender, normal inspection, normal range of 

motion, no evidence of injury


Foot Exam: bilateral foot: non-tender, normal inspection, normal range of 

motion, no evidence of injury


Neuro/Tendon Exam: normal sensation, normal motor functions, normal tendon 

functions, responds to pain


Mental Status Exam: alert, oriented x 3, agitated


Skin Exam: normal color, warm, dry


**SpO2 Interpretation**: normal


Ordered Tests: 


                               Active Orders 24 hr











 Category Date Time Status


 


 IV Insertion STAT Care  12/09/20 22:18 Active


 


 CBC W DIFF Stat Lab  12/09/20 22:50 Received


 


 CMP Stat Lab  12/09/20 22:50 Completed


 


 INFLUENZA A+B JESSICA Stat Lab  12/09/20 22:50 Completed


 


 MAGNESIUM Stat Lab  12/09/20 22:50 Completed


 


 Mono Screen Stat Lab  12/09/20 22:50 Completed


 


 PT INR [PROTIME WITH INR] Stat Lab  12/09/20 22:50 Completed








Medication Summary











Generic Name Dose Route Start Last Admin





  Trade Name Freq  PRN Reason Stop Dose Admin


 


Gabapentin  300 mg  12/10/20 22:33  12/09/20 23:01





  Neurontin 300 Mg***  PO  12/10/20 22:34  300 mg





  STAT ONE   Administration


 


Sodium Chloride  1,000 mls @ 100 mls/hr  12/09/20 22:30  12/09/20 22:32





  Sodium Chloride 0.9% 1000 Ml  IV  01/08/21 22:29  100 mls/hr





  .Q10H LUDY   Administration


 


Pramipexole Dihydrochloride  0.25 mg  12/10/20 22:35  12/09/20 23:01





  Mirapex 0.5 Mg Tablet***  PO  12/10/20 22:36  0.25 mg





  STAT ONE   Administration














Discontinued Medications














Generic Name Dose Route Start Last Admin





  Trade Name Freq  PRN Reason Stop Dose Admin


 


Gabapentin  Confirm  12/09/20 22:57 





  Neurontin 300 Mg***  Administered  12/09/20 22:58 





  Dose  





  300 mg  





  .ROUTE  





  .STK-MED ONE  


 


Pramipexole Dihydrochloride  Confirm  12/09/20 22:57 





  Mirapex 0.5 Mg Tablet***  Administered  12/09/20 22:58 





  Dose  





  0.5 mg  





  .ROUTE  





  .STK-MED ONE  











Lab/Rad Data: 


                           Laboratory Result Diagrams





                                 12/09/20 22:50 





                               Laboratory Results











  12/09/20 12/09/20 12/09/20 Range/Units





  22:50 22:50 22:50 


 


PT  15.6 H    (9.95-12.35)  SECONDS


 


INR  1.38    (0.8-3.0)  


 


Sodium     (137-145)  mmol/L


 


Potassium     (3.5-5.1)  mmol/L


 


Chloride     ()  mmol/L


 


Carbon Dioxide     (22-30)  mmol/L


 


Anion Gap     (5-15)  MEQ/L


 


BUN     (7-17)  mg/dL


 


Creatinine     (0.52-1.04)  mg/dL


 


Estimated GFR     ML/MIN


 


Glucose     ()  mg/dL


 


Calcium     (8.4-10.2)  mg/dL


 


Magnesium     (1.6-2.3)  mg/dL


 


Total Bilirubin     (0.2-1.3)  mg/dL


 


AST     (14-36)  U/L


 


ALT     (0-35)  U/L


 


Alkaline Phosphatase     ()  U/L


 


Serum Total Protein     (6.3-8.2)  g/dL


 


Albumin     (3.5-5.0)  g/dL


 


Monoscreen   NEGATIVE   (Negative)  


 


Influenza Type A Ag    NEGATIVE  (NEGATIVE)  


 


Influenza Type B Ag    NEGATIVE  (NEGATIVE)  














  12/09/20 Range/Units





  22:50 


 


PT   (9.95-12.35)  SECONDS


 


INR   (0.8-3.0)  


 


Sodium  135 L  (137-145)  mmol/L


 


Potassium  4.5  (3.5-5.1)  mmol/L


 


Chloride  96 L  ()  mmol/L


 


Carbon Dioxide  31 H  (22-30)  mmol/L


 


Anion Gap  11.7  (5-15)  MEQ/L


 


BUN  19 H  (7-17)  mg/dL


 


Creatinine  2.92 H  (0.52-1.04)  mg/dL


 


Estimated GFR  17.5  ML/MIN


 


Glucose  113 H  ()  mg/dL


 


Calcium  9.5  (8.4-10.2)  mg/dL


 


Magnesium  2.2  (1.6-2.3)  mg/dL


 


Total Bilirubin  0.50  (0.2-1.3)  mg/dL


 


AST  23  (14-36)  U/L


 


ALT  14  (0-35)  U/L


 


Alkaline Phosphatase  82  ()  U/L


 


Serum Total Protein  7.4  (6.3-8.2)  g/dL


 


Albumin  4.1  (3.5-5.0)  g/dL


 


Monoscreen   (Negative)  


 


Influenza Type A Ag   (NEGATIVE)  


 


Influenza Type B Ag   (NEGATIVE)  














- Progress


Progress: improved, pain not gone completely, re-examined


Counseled pt/family regarding: lab results, diagnosis, need for follow-up





- Departure


Departure Disposition: Home


Clinical Impression: 


 Leg pain, Restless leg syndrome





Condition: Stable


Critical Care Time: No


Referrals: 


DOCTOR,NO FAMILY [Primary Care Provider] - 


Additional Instructions: 


Follow-up with your prescribing doctor tomorrow to make arrangements for 

reviewing your medications and to prescribe necessary medications for outpatient

control of your medical issues

## 2020-12-10 VITALS — SYSTOLIC BLOOD PRESSURE: 135 MMHG | DIASTOLIC BLOOD PRESSURE: 85 MMHG | HEART RATE: 100 BPM

## 2020-12-10 LAB — BLD SMEAR INTERP: YES

## 2020-12-16 ENCOUNTER — HOSPITAL ENCOUNTER (OUTPATIENT)
Dept: HOSPITAL 33 - ED | Age: 60
Setting detail: OBSERVATION
LOS: 1 days | Discharge: ANOTHER HOSPITAL | End: 2020-12-17
Attending: FAMILY MEDICINE | Admitting: FAMILY MEDICINE
Payer: MEDICARE

## 2020-12-16 DIAGNOSIS — Z86.73: ICD-10-CM

## 2020-12-16 DIAGNOSIS — Z79.01: ICD-10-CM

## 2020-12-16 DIAGNOSIS — I12.9: Primary | ICD-10-CM

## 2020-12-16 DIAGNOSIS — Z79.899: ICD-10-CM

## 2020-12-16 DIAGNOSIS — N18.4: ICD-10-CM

## 2020-12-16 DIAGNOSIS — R52: ICD-10-CM

## 2020-12-16 LAB
ANION GAP SERPL CALC-SCNC: 10.4 MEQ/L (ref 5–15)
BASOPHILS # BLD AUTO: 0.03 10*3/UL (ref 0–0.4)
BASOPHILS NFR BLD AUTO: 0.3 % (ref 0–0.4)
BUN SERPL-MCNC: 17 MG/DL (ref 7–17)
CALCIUM SPEC-MCNC: 9.3 MG/DL (ref 8.4–10.2)
CHLORIDE SERPL-SCNC: 94 MMOL/L (ref 98–107)
CO2 SERPL-SCNC: 31 MMOL/L (ref 22–30)
CREAT SERPL-MCNC: 3.1 MG/DL (ref 0.52–1.04)
EOSINOPHIL # BLD AUTO: 0.11 10*3/UL (ref 0–0.5)
GFR SERPLBLD BASED ON 1.73 SQ M-ARVRAT: 16.3 ML/MIN
GLUCOSE SERPL-MCNC: 87 MG/DL (ref 74–106)
HCT VFR BLD AUTO: 30.6 % (ref 35–47)
HGB BLD-MCNC: 9.6 GM/DL (ref 12–16)
LYMPHOCYTES # SPEC AUTO: 2.44 10*3/UL (ref 1–4.6)
MCH RBC QN AUTO: 30.9 PG (ref 26–32)
MCHC RBC AUTO-ENTMCNC: 31.4 G/DL (ref 32–36)
MONOCYTES # BLD AUTO: 0.7 10*3/UL (ref 0–1.3)
PLATELET # BLD AUTO: 63 K/MM3 (ref 150–450)
POTASSIUM SERPLBLD-SCNC: 3.7 MMOL/L (ref 3.5–5.1)
RBC # BLD AUTO: 3.11 M/MM3 (ref 4.1–5.4)
SODIUM SERPL-SCNC: 132 MMOL/L (ref 137–145)
WBC # BLD AUTO: 9.1 K/MM3 (ref 4–10.5)

## 2020-12-16 PROCEDURE — 85025 COMPLETE CBC W/AUTO DIFF WBC: CPT

## 2020-12-16 PROCEDURE — 99285 EMERGENCY DEPT VISIT HI MDM: CPT

## 2020-12-16 PROCEDURE — G0378 HOSPITAL OBSERVATION PER HR: HCPCS

## 2020-12-16 PROCEDURE — 96374 THER/PROPH/DIAG INJ IV PUSH: CPT

## 2020-12-16 PROCEDURE — 80048 BASIC METABOLIC PNL TOTAL CA: CPT

## 2020-12-16 PROCEDURE — 96375 TX/PRO/DX INJ NEW DRUG ADDON: CPT

## 2020-12-16 PROCEDURE — 36000 PLACE NEEDLE IN VEIN: CPT

## 2020-12-16 PROCEDURE — 36415 COLL VENOUS BLD VENIPUNCTURE: CPT

## 2020-12-16 PROCEDURE — 94762 N-INVAS EAR/PLS OXIMTRY CONT: CPT

## 2020-12-16 RX ADMIN — MORPHINE SULFATE PRN MG: 4 INJECTION, SOLUTION INTRAMUSCULAR; INTRAVENOUS at 19:41

## 2020-12-16 NOTE — ERPHSYRPT
- History of Present Illness


Time Seen by Provider: 12/16/20 15:26


Source: patient


Exam Limitations: clinical condition


Physician History: 





This is a 60-year-old white female who is noncompliant and has a history of 

chronic renal failure on Monday Wednesday Friday dialysis.  Patient also has a 

history of alcoholism/substance abuse in the past, coronary artery disease, d

ementia, gastroesophageal reflux disease, hypertension, transient ischemic 

attacks as well as depression and restless leg syndrome.  Patient states she no 

longer has a primary care provider.  Patient is not taking all her medications 

as prescribed.  Patient was evaluated at [facility] in [facility] with a complaint of fall and injury to her head and both shoulders.  I 

reviewed the records from that visit.  That visit was performed this morning, 

12/16/2020, at 6:00 in the morning.  There was an EKG performed which showed no 

acute ischemic changes.  The hemoglobin was 10.1, white count was 8.5.  BUN 

creatinine (prior to dialysis this morning) was 42/6.0 with a normal potassium 

and sodium level.  CAT scan of the cervical spine showed no acute subluxation or

fracture.  There were degenerative changes.  The right shoulder x-ray was intact

with mild/moderate degenerative changes but no acute dislocation or fracture.  

X-ray of the left shoulder also showed the left shoulder to be intact with mild 

degenerative changes present there were no acute fractures or dislocation prese

nt.  CAT scan of the head shows no evidence of any intracranial bleed there are 

old infarcts present.  And there were no changes in the CAT scan of the head 

without contrast when compared to CAT scan of the head dated 8/30/2020.  Patient

was discharged to home.  She then went to dialysis.  Patient presents to the 

emergency department with intractable pain in her shoulders.


Occurred: this morning


Reason for Fall: unknown


Injuries/Pain Location: head, neck, upper extremity (Bilateral shoulders)


Loss of Consciousness: no loss of consciousness


Severity of Pain-Max: moderate


Severity of Pain-Current: moderate


Modifying Factors: Improves With: movement


Associated Symptoms (Fall): other (No new falls or injuries.)


Allergies/Adverse Reactions: 








amoxicillin Allergy (Verified 12/16/20 15:27)


   


   unknown reaction 


oxytetracycline [From Terramycin] Allergy (Verified 12/16/20 15:27)


   


oxytetracycline HCl [From Terramycin] Allergy (Verified 12/16/20 15:27)


   


   unknown reaction 


Penicillins Allergy (Verified 12/16/20 15:27)


   


   unknown reaction 





Home Medications: 








Bumetanide 2 mg PO DAILY 11/01/19 [History]


Carvedilol 3.125 mg*** [Coreg 3.125 MG***] 3.125 mg PO BID 11/01/19 [History]


Cholecalciferol (Vitamin D3) [Vitamin D3] 1,000 unit PO DAILY 11/01/19 [History]


Gabapentin 400 mg PO TID 11/01/19 [History]


Isosorbide Mononitrate [Isosorbide Mononitrate ER] 30 mg PO DAILY 11/01/19 

[History]


Nitroglycerin 0.4 mg Tablet*** [Nitrostat 0.4 MG Tablet***] 0.4 mg SL Q5MIN PRN 

MR X 3 PRN 11/01/19 [History]


PANTOPRAZOLE 40 mg Tablet*** [Protonix 40MG Tablet***] 40 mg PO QAM 11/01/19 

[History]


Pramipexole Di-HCl [Mirapex] 1 mg PO BID 11/01/19 [History]


Rosuvastatin Calcium 20 mg PO HS 11/01/19 [History]


Warfarin Sodium 5 mg*** [Coumadin 5 MG***] 5 mg PO DAILY 11/01/19 [History]


calcitrioL [Calcitriol] 0.25 mcg PO DAILY 11/01/19 [History]


Allopurinol 100 mg*** [Zyloprim 100 mg***] 100 mg PO DAILY 01/02/20 [History]


Cyclobenzaprine HCl 10 mg*** [Cyclobenzaprine 10 MG***] 10 mg PO TID 01/02/20 

[History]


buPROPion HCl [Bupropion HCl Sr] 200 mg PO BID 01/02/20 [History]


Aspirin [Aspirin EC] 1 tab PO DAILY 03/11/20 [History]


Enoxaparin Sodium 80 mg SPINAL Q12H 03/11/20 [History]





Hx Tetanus, Diphtheria Vaccination/Date Given: Yes


Hx Influenza Vaccination/Date Given: Yes


Hx Pneumococcal Vaccination/Date Given: Yes





Travel Risk





- International Travel


Have you traveled outside of the country in past 3 weeks: No





- Coronavirus Screening


Are you exhibiting any of the following symptoms?: No


Close contact with a COVID-19 positive Pt in past 14-21 Days: No





- Review of Systems


Constitutional: Weakness


Eyes: No Symptoms


Ears, Nose, & Throat: No Symptoms


Respiratory: No Symptoms


Cardiac: No Symptoms


Abdominal/Gastrointestinal: No Symptoms


Genitourinary Symptoms: No Symptoms


Musculoskeletal: Fall, Injury, Other (Persistent pain to bilateral shoulders 

after falling earlier this morning.)


Skin: No Symptoms


Neurological: No Symptoms


Psychological: No Symptoms


Endocrine: No Symptoms


Hematologic/Lymphatic: No Symptoms


Immunological/Allergic: No Symptoms


All Other Systems: Reviewed and Negative





- Past Medical History


Pertinent Past Medical History: Yes


Neurological History: Stroke, TIA


ENT History: No Pertinent History


Cardiac History: Aneurysm, Congestive Heart Failure, Hypertension, Myocardial 

Infarction (MI)


Respiratory History: CHF, Pneumonia


Endocrine Medical History: No Pertinent History


Musculoskeletal History: Osteoporosis


GI Medical History: GERD


 History: No Pertinent History


Psycho-Social History: Anxiety, Depression


Female Reproductive Disorders: Endometriosis


Other Medical History: LUPUS ANTICOAGULANT DISORDER, MORBID OBESITY, GOUT, 

ANXIETY, DEPRESSION, VENTRICULAR ARRHYTHMIA, CKD STAGE 4, OSTEOPOROSIS, HYP

ERGLYCEMIA, FRACTURE PATELLA 2016, dialysis





- Past Surgical History


Past Surgical History: Yes


Neuro Surgical History: No Pertinent History


Cardiac: Cardiac Stent


Respiratory: No Pertinent History


Gastrointestinal: No Pertinent History


Genitourinary: No Pertinent History


Musculoskeletal: No Pertinent History


Female Surgical History: Hysterectomy


Other Surgical History: CARPAL TUNNEL-RIGHT WRIST, RIGHT FOOT





- Social History


Smoking Status: Current every day smoker


How long have you smoked: years


Exposure to second hand smoke: No


Drug Use: marijuana


Patient Lives Alone: Yes





- Nursing Vital Signs


Nursing Vital Signs: 


                               Initial Vital Signs











Temperature  97.5 F   12/16/20 15:29


 


Pulse Rate  89   12/16/20 15:29


 


Respiratory Rate  18   12/16/20 15:29


 


Blood Pressure  125/71   12/16/20 15:29


 


O2 Sat by Pulse Oximetry  98   12/16/20 15:29








                                   Pain Scale











Pain Intensity                 8

















- Jaron Coma Score


Best Eye Response (Jaron): (4) open spontaneously


Best Verbal Response ([place]): (4) confused conversation


Best Motor Response (Jaron): (6) obeys commands


Jaron Total: 14





- Physical Exam


General Appearance: mild distress, alert, anxiety


Head Injury: no evidence of injury


Eye Exam: PERRL/EOMI, eyes nml inspection


ENT Exam: airway nml, nml ext.inspection


Neck Exam: supple, trachea midline, full range of motion, normal alignment, 

normal inspection


Respiratory/Chest Exam: normal breath sounds, No chest tenderness, No 

respiratory distress, No ecchymosis, No crepitus


Cardiovascular Exam: normal heart sounds, regular rate/rhythm


Gastrointestinal Exam: soft, normal bowel sounds, No tenderness


Rectal Exam: not done


Back Exam: normal inspection, normal range of motion, No CVA tenderness, No 

vertebral tenderness


Extremity Exam: normal inspection, normal range of motion, tenderness (Bilateral

 shoulders right worse than left.  Patient has full range of motion and there is

 no evidence of any deformities on any extremity)


Neurologic Exam: alert, oriented x 3, cooperative, CNs II-XII nml as tested


Skin Exam: normal color, warm, dry


**SpO2 Interpretation**: normal


O2 Delivery: Room Air





- Course


Nursing assessment & vital signs reviewed: Yes


Ordered Tests: 


                               Active Orders 24 hr











 Category Date Time Status


 


 IV Insertion STAT Care  12/16/20 16:24 Active


 


 BMP Stat Lab  12/16/20 17:08 Completed


 


 CBC W DIFF Stat Lab  12/16/20 17:08 Completed


 


 Transfer Order Routine Transfer  12/16/20 Ordered








Medication Summary














Discontinued Medications














Generic Name Dose Route Start Last Admin





  Trade Name Wilfridq  PRN Reason Stop Dose Admin


 


Morphine Sulfate  4 mg  12/16/20 16:24  12/16/20 16:45





  Morphine Sulfate 4 Mg Inj***  IV  12/16/20 16:25  4 mg





  STAT ONE   Administration


 


Morphine Sulfate  Confirm  12/16/20 16:39 





  Morphine Sulfate 4 Mg Inj***  Administered  12/16/20 16:40 





  Dose  





  4 mg  





  .ROUTE  





  .STK-MED ONE  


 


Ondansetron HCl  4 mg  12/16/20 16:24  12/16/20 16:42





  Zofran 4 Mg/2 Ml Vial**  IV  12/16/20 16:25  4 mg





  STAT ONE   Administration


 


Ondansetron HCl  Confirm  12/16/20 16:39 





  Zofran 4 Mg/2 Ml Vial**  Administered  12/16/20 16:40 





  Dose  





  4 mg  





  .ROUTE  





  .STK-MED ONE  











Lab/Rad Data: 


                           Laboratory Result Diagrams





                                 12/16/20 17:08 





                                 12/16/20 17:08 





                               Laboratory Results











  12/16/20 12/16/20 Range/Units





  17:08 17:08 


 


WBC   9.1  (4.0-10.5)  K/mm3


 


RBC   3.11 L  (4.1-5.4)  M/mm3


 


Hgb   9.6 L  (12.0-16.0)  gm/dl


 


Hct   30.6 L  (35-47)  %


 


MCV   98.4  ()  fl


 


MCH   30.9  (26-32)  pg


 


MCHC   31.4 L  (32-36)  g/dl


 


RDW   13.9  (11.5-14.0)  %


 


Plt Count   63 L  (150-450)  K/mm3


 


MPV   11.9 H  (7.5-11.0)  fl


 


Gran %   64.0  (36.0-66.0)  %


 


Eos # (Auto)   0.11  (0-0.5)  


 


Absolute Lymphs (auto)   2.44  (1.0-4.6)  


 


Absolute Monos (auto)   0.70  (0.0-1.3)  


 


Lymphocytes %   26.8  (24.0-44.0)  %


 


Monocytes %   7.7  (0.0-12.0)  %


 


Eosinophils %   1.2  (0.00-5.0)  %


 


Basophils %   0.3  (0.0-0.4)  %


 


Absolute Granulocytes   5.83  (1.4-6.9)  


 


Basophils #   0.03  (0-0.4)  


 


Sodium  132 L   (137-145)  mmol/L


 


Potassium  3.7   (3.5-5.1)  mmol/L


 


Chloride  94 L   ()  mmol/L


 


Carbon Dioxide  31 H   (22-30)  mmol/L


 


Anion Gap  10.4   (5-15)  MEQ/L


 


BUN  17   (7-17)  mg/dL


 


Creatinine  3.10 H   (0.52-1.04)  mg/dL


 


Estimated GFR  16.3   ML/MIN


 


Glucose  87   ()  mg/dL


 


Calcium  9.3   (8.4-10.2)  mg/dL














- Progress


Progress: improved, pain not gone completely, re-examined


Progress Note: 





12/16/20 16:49


Medical decision making: This patient states that she no longer has a primary 

care physician.  Patient is noncompliant.  Patient is not taking her medication 

as prescribed.  Patient had fallen earlier this morning and she has already had 

x-rays of these areas and they are negative for any acute issue.  Patient has 

persistent, intractable pain.  She is a dialysis patient.  However ever, she did

 receive her dialysis treatment today.  She arrives with stable/normal vital 

signs.  I believe the patient will benefit from placement in observation and 

pain control intravenously.  In addition, we will make arrangements for case 

management to evaluate this patient for placement in a facility if she 

qualifies.  I spoke with Dr. Monsalve who is covering for patients who do not 

have any primary care physician and she agrees.  We will obtain some basic labs 

and then place her in observation in the hospital.


Discussed with : Isiah


Counseled pt/family regarding: lab results, diagnosis





- Departure


Departure Disposition: Observation


Clinical Impression: 


 Intractable pain, Case management patient, Chronic renal failure





Condition: Stable


Critical Care Time: No


Referrals: 


DOCTOR,NO FAMILY [Primary Care Provider] -

## 2020-12-17 VITALS — OXYGEN SATURATION: 94 % | DIASTOLIC BLOOD PRESSURE: 57 MMHG | HEART RATE: 88 BPM | SYSTOLIC BLOOD PRESSURE: 106 MMHG

## 2020-12-17 RX ADMIN — MORPHINE SULFATE PRN MG: 4 INJECTION, SOLUTION INTRAMUSCULAR; INTRAVENOUS at 00:29

## 2020-12-17 RX ADMIN — MORPHINE SULFATE PRN MG: 4 INJECTION, SOLUTION INTRAMUSCULAR; INTRAVENOUS at 09:40

## 2020-12-17 NOTE — PCM.SSS
History of Present Illness





- Chief Complaint


Chief Complaint: CHRONIC RENAL FAILURE, CASE MANAGEMENT PATIENT


History of Present Illness: 


 is a 60 year old female.








Medications & Allergies


Home Medications: 


                              Home Medication List





Doxepin HCl 10 mg PO HS 12/17/20 [History Confirmed 12/17/20]


PANTOPRAZOLE 40 mg Tablet*** [Protonix 40MG Tablet***] 40 mg PO DAILY 12/17/20 

[History Confirmed 12/17/20]


Pramipexole Di-HCl [Mirapex] 1 mg PO EVENING MEAL 12/17/20 [History Confirmed 

12/17/20]


Warfarin Sodium 4 mg PO HS 12/17/20 [History Confirmed 12/17/20]








Allergies/Adverse Reactions: 


                                    Allergies











Allergy/AdvReac Type Severity Reaction Status Date / Time


 


amoxicillin Allergy   Verified 12/16/20 20:21


 


oxytetracycline Allergy   Verified 12/16/20 20:21





[From Terramycin]     


 


oxytetracycline HCl Allergy   Verified 12/16/20 20:21





[From Terramycin]     


 


Penicillins Allergy   Verified 12/16/20 20:21














- Past Medical History


Past Medical History: Yes


Neurological History: TIA


ENT History: No Pertinent History


Cardiac History: Aneurysm, Congestive Heart Failure, Hypertension, Myocardial 

Infarction (MI)


Respiratory History: CHF, Pneumonia


Endocrine Medical History: No Pertinent History


Musculoskelatal History: Osteoporosis


GI Medical History: GERD


 History: No Pertinent History


Pyscho-Social History: Anxiety, Depression


Reproductive Disorders: Endometriosis


Comment: LUPUS ANTICOAGULANT DISORDER, GOUT, VENTRICULAR ARRHYTHMIA, CKD STAGE 

4, OSTEOPOROSIS, HYPERGLYCEMIA, FRACTURE PATELLA 2016, dialysis MWF





- Female History


Are you pregnant now?: No





- Past Surgical History


Past Surgical History: Yes


Neuro Surgical History: No Pertinent History


Cardiac History: Cardiac Stent


Respiratory Surgery: No Pertinent History


GI Surgical History: No Pertinent History


Genitourinary Surgical Hx: No Pertinent History


Musculskeletal Surgical Hx: No Pertinent History


Female Surgical History: Hysterectomy


Other Surgical History: CARPAL TUNNEL-RIGHT WRIST, RIGHT FOOT





- Social History


Smoking Status: Current every day smoker


How long have you smoked: 46 years


Exposure to second hand smoke: No


Alcohol: None


Drug Use: marijuana





- Physical Exam


Vital Signs: 


                               Vital Signs - 24 hr











  Temp Pulse Resp BP Pulse Ox


 


 12/17/20 16:00  98.8 F  88  18  106/57  94 L


 


 12/17/20 12:00  98.1 F  95 H  18  92/52  92 L


 


 12/17/20 08:01      100


 


 12/17/20 07:38  98.0 F  80  18  109/53  100


 


 12/17/20 04:00  97.6 F  81  16  116/53  97


 


 12/17/20 00:00  97.8 F  82  16  92/54  97


 


 12/16/20 21:50      98








                              Oxygen-Last 24 hours











Oxygen Flowrate (L/min)-RT     2


 


Oxygen Flowrate (L/min)-RT     2

















Results





- Other Procedures and Tests


                               Respiratory Therapy





12/16/20 21:53


Oxygen NASAL CANNULA 2 [facility] 














Hospital Summary





- Vitals & Intake/Output


Vital Signs: 


                                   Vital Signs











Temperature  98.8 F   12/17/20 16:00


 


Pulse Rate  88   12/17/20 16:00


 


Respiratory Rate  18   12/17/20 16:00


 


Blood Pressure  106/57   12/17/20 16:00


 


O2 Sat by Pulse Oximetry  94 L  12/17/20 16:00











Intake & Output: 


                                 Intake & Output











 12/15/20 12/16/20 12/17/20 12/18/20





 11:59 11:59 11:59 11:59


 


Intake Total   700 


 


Balance   700 


 


Weight   68.8 kg 














- Lab


Result Diagrams: 


                                 12/16/20 17:08





                                 12/16/20 17:08





- Procedures and Test


Procedures and Tests throughout Hospitalization: 


                            Therapy Orders & Screens





12/16/20 19:02


Respiratory Therapy Consult ROUTINE 


   Comment: 


   Reason For Exam: 





12/16/20 19:38


Respiratory Therapy Assessment DAILY 


   Comment: 





12/16/20 20:20


OT Screen per Nursing Assess ONCE 


   Comment: Protocol Order


   Physician Instructions: Greater than 3 points order OT Admission Screening


   Reason For Exam: Triggered on Admission


   Diagnosis: CHRONIC RENAL FAILURE, CASE MANAGEMENT PATIENT


   Open Wound/Cellutlitis/Pressure Ulcers: No


   Acute Fx/ORIF/Change in wt bearing status: No


   Severe MUSCULOSKELETAL pain: Yes


   ADL Dysfunction: No


   Acute CVA w/Hemiparesis/Hemiplegia: No


   Decreased Functional Mobility/Strength: No


   Sprain/Strain: No


   Acute Post-op Mobility Dysfunction: No


   Total Points: 5


PT Screen per Nursing Assess ONCE 


   Comment: Protocol Order


   Physician Instructions: Greater than 3 points order PT Admission Screenin


   Reason For Exam: Triggered on Admission


   Diagnosis: CHRONIC RENAL FAILURE, CASE MANAGEMENT PATIENT


   Open Wound/Cellutlitis/Pressure Ulcers: No


   Acute Fx/ORIF/Change in wt bearing status: No


   Severe MUSCULOSKELETAL pain: Yes


   ADL Dysfunction: No


   Acute CVA w/Hemiparesis/Hemiplegia: No


   Decreased Functional Mobility/Strength: No


   Sprain/Strain: No


   Acute Post-op Mobility Dysfunction: No


   Total Points: 5


Smoking Cessation Education ONCE 


   Comment: 


   Diagnosis: CHRONIC RENAL FAILURE, CASE MANAGEMENT PATIENT


   Smoking Status: Current every day smoker


   How long have you smoked: 46 years


   Have you smoked in the past 12 months: Yes


   Approximately how many cigarettes per day: 3


   Do you dip or chew tobacco: No


   If,Former Smoker,when did you quit: 1.5 years





12/16/20 21:53


Oxygen NASAL CANNULA 2 lpm 


   Comment: 


   Diagnosis: CHRONIC RENAL FAILURE, CASE MANAGEMENT PATIENT





12/17/20 09:34


PT Eval & Treat (MD Order) ONCE 


   Reason for Eval:: WEAKNESS, PAIN


   Diagnosis: CHRONIC RENAL FAILURE, CASE MANAGEMENT PATIENT














- Discharge


Disposition: DC TO [facility]


Condition: Stable


Prescriptions: 


No Action


   PANTOPRAZOLE 40 mg Tablet*** [Protonix 40MG Tablet***] 40 mg PO DAILY


   Doxepin HCl 10 mg PO HS


   Warfarin Sodium 4 mg PO HS


   Pramipexole Di-HCl [Mirapex] 1 mg PO EVENING MEAL


Forms:  Ambulance Transport Record, Transfer Record Inter-Agency

## 2021-01-21 ENCOUNTER — HOSPITAL ENCOUNTER (EMERGENCY)
Dept: HOSPITAL 33 - ED | Age: 61
LOS: 1 days | Discharge: ANOTHER HOSPITAL | End: 2021-01-22
Payer: MEDICARE

## 2021-01-21 DIAGNOSIS — Z79.01: ICD-10-CM

## 2021-01-21 DIAGNOSIS — I10: ICD-10-CM

## 2021-01-21 DIAGNOSIS — I21.4: Primary | ICD-10-CM

## 2021-01-21 DIAGNOSIS — Z79.899: ICD-10-CM

## 2021-01-21 DIAGNOSIS — I25.2: ICD-10-CM

## 2021-01-21 DIAGNOSIS — I50.9: ICD-10-CM

## 2021-01-21 DIAGNOSIS — N18.4: ICD-10-CM

## 2021-01-21 LAB
BASOPHILS # BLD AUTO: 0.01 10*3/UL (ref 0–0.4)
BASOPHILS NFR BLD AUTO: 0.1 % (ref 0–0.4)
EOSINOPHIL # BLD AUTO: 0.38 10*3/UL (ref 0–0.5)
HCT VFR BLD AUTO: 28.6 % (ref 35–47)
HGB BLD-MCNC: 8.7 GM/DL (ref 12–16)
LYMPHOCYTES # SPEC AUTO: 2.87 10*3/UL (ref 1–4.6)
MCH RBC QN AUTO: 31.1 PG (ref 26–32)
MCHC RBC AUTO-ENTMCNC: 30.4 G/DL (ref 32–36)
MONOCYTES # BLD AUTO: 0.62 10*3/UL (ref 0–1.3)
PLATELET # BLD AUTO: 73 K/MM3 (ref 150–450)
RBC # BLD AUTO: 2.8 M/MM3 (ref 4.1–5.4)
WBC # BLD AUTO: 10.3 K/MM3 (ref 4–10.5)

## 2021-01-21 PROCEDURE — 96375 TX/PRO/DX INJ NEW DRUG ADDON: CPT

## 2021-01-21 PROCEDURE — 85025 COMPLETE CBC W/AUTO DIFF WBC: CPT

## 2021-01-21 PROCEDURE — 36000 PLACE NEEDLE IN VEIN: CPT

## 2021-01-21 PROCEDURE — 80053 COMPREHEN METABOLIC PANEL: CPT

## 2021-01-21 PROCEDURE — 87400 INFLUENZA A/B EACH AG IA: CPT

## 2021-01-21 PROCEDURE — 96374 THER/PROPH/DIAG INJ IV PUSH: CPT

## 2021-01-21 PROCEDURE — 83880 ASSAY OF NATRIURETIC PEPTIDE: CPT

## 2021-01-21 PROCEDURE — 93041 RHYTHM ECG TRACING: CPT

## 2021-01-21 PROCEDURE — 99291 CRITICAL CARE FIRST HOUR: CPT

## 2021-01-21 PROCEDURE — 36415 COLL VENOUS BLD VENIPUNCTURE: CPT

## 2021-01-21 PROCEDURE — 99285 EMERGENCY DEPT VISIT HI MDM: CPT

## 2021-01-21 PROCEDURE — 93005 ELECTROCARDIOGRAM TRACING: CPT

## 2021-01-21 PROCEDURE — 71045 X-RAY EXAM CHEST 1 VIEW: CPT

## 2021-01-21 PROCEDURE — 83605 ASSAY OF LACTIC ACID: CPT

## 2021-01-21 PROCEDURE — 85610 PROTHROMBIN TIME: CPT

## 2021-01-21 PROCEDURE — 94760 N-INVAS EAR/PLS OXIMETRY 1: CPT

## 2021-01-21 PROCEDURE — 84484 ASSAY OF TROPONIN QUANT: CPT

## 2021-01-22 VITALS — OXYGEN SATURATION: 100 %

## 2021-01-22 VITALS — SYSTOLIC BLOOD PRESSURE: 121 MMHG | DIASTOLIC BLOOD PRESSURE: 69 MMHG

## 2021-01-22 VITALS — HEART RATE: 89 BPM

## 2021-01-22 LAB
ALBUMIN SERPL-MCNC: 3.8 G/DL (ref 3.5–5)
ALP SERPL-CCNC: 107 U/L (ref 38–126)
ALT SERPL-CCNC: 11 U/L (ref 0–35)
ANION GAP SERPL CALC-SCNC: 10.9 MEQ/L (ref 5–15)
AST SERPL QL: 17 U/L (ref 14–36)
BILIRUB BLD-MCNC: 0.3 MG/DL (ref 0.2–1.3)
BLD SMEAR INTERP: YES
BNP SERPL-MCNC: (no result) PG/ML (ref 0–900)
BUN SERPL-MCNC: 37 MG/DL (ref 7–17)
CALCIUM SPEC-MCNC: 10 MG/DL (ref 8.4–10.2)
CHLORIDE SERPL-SCNC: 102 MMOL/L (ref 98–107)
CO2 SERPL-SCNC: 28 MMOL/L (ref 22–30)
CREAT SERPL-MCNC: 3.02 MG/DL (ref 0.52–1.04)
FLUBV AG SPEC QL IA: NEGATIVE
GFR SERPLBLD BASED ON 1.73 SQ M-ARVRAT: 16.8 ML/MIN
GLUCOSE SERPL-MCNC: 137 MG/DL (ref 74–106)
INFLUENZA A: NEGATIVE
INR PPP: 1.21 (ref 0.8–3)
POTASSIUM SERPLBLD-SCNC: 3.9 MMOL/L (ref 3.5–5.1)
PROT SERPL-MCNC: 7.1 G/DL (ref 6.3–8.2)
PROTHROMBIN TIME: 13.7 SECONDS (ref 9.95–12.35)
SODIUM SERPL-SCNC: 138 MMOL/L (ref 137–145)

## 2021-01-22 NOTE — XRAY
Indication: Chest pain and short of breath.



Comparison: December 6, 2020.



Portable chest unchanged again demonstrating bibasilar discoid

atelectasis/scarring, tiny pulmonary/hilar calcified granulomas, cardiomegaly,

and right double-lumen dialysis catheter.  Bony thorax intact again with

osteopenia and degenerative changes.  No new/acute findings.



Impression: Continued nonacute chest with chronic features.

## 2021-03-24 ENCOUNTER — HOSPITAL ENCOUNTER (EMERGENCY)
Dept: HOSPITAL 33 - ED | Age: 61
Discharge: HOME | End: 2021-03-24
Payer: MEDICARE

## 2021-03-24 VITALS — SYSTOLIC BLOOD PRESSURE: 127 MMHG | DIASTOLIC BLOOD PRESSURE: 58 MMHG

## 2021-03-24 VITALS — HEART RATE: 83 BPM | OXYGEN SATURATION: 100 %

## 2021-03-24 VITALS — OXYGEN SATURATION: 96 %

## 2021-03-24 VITALS — HEART RATE: 68 BPM | SYSTOLIC BLOOD PRESSURE: 120 MMHG | DIASTOLIC BLOOD PRESSURE: 54 MMHG

## 2021-03-24 DIAGNOSIS — I12.9: ICD-10-CM

## 2021-03-24 DIAGNOSIS — Z91.15: ICD-10-CM

## 2021-03-24 DIAGNOSIS — M85.80: ICD-10-CM

## 2021-03-24 DIAGNOSIS — Z72.0: ICD-10-CM

## 2021-03-24 DIAGNOSIS — D69.6: ICD-10-CM

## 2021-03-24 DIAGNOSIS — Z91.19: ICD-10-CM

## 2021-03-24 DIAGNOSIS — I12.0: ICD-10-CM
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
## 2021-03-24 DIAGNOSIS — G93.89: ICD-10-CM

## 2021-03-24 DIAGNOSIS — I50.9: ICD-10-CM

## 2021-03-24 DIAGNOSIS — M25.551: ICD-10-CM

## 2021-03-24 DIAGNOSIS — M48.00: ICD-10-CM
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
## 2021-03-24 DIAGNOSIS — I25.2: ICD-10-CM
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
## 2021-03-24 DIAGNOSIS — Z86.73: ICD-10-CM

## 2021-03-24 DIAGNOSIS — M81.0: ICD-10-CM

## 2021-03-24 DIAGNOSIS — D53.1: ICD-10-CM

## 2021-03-24 DIAGNOSIS — Z99.2: ICD-10-CM

## 2021-03-24 DIAGNOSIS — S22.31XA: Primary | ICD-10-CM

## 2021-03-24 DIAGNOSIS — N18.6: ICD-10-CM

## 2021-03-24 DIAGNOSIS — M83.8: ICD-10-CM

## 2021-03-24 DIAGNOSIS — N18.4: ICD-10-CM

## 2021-03-24 DIAGNOSIS — M54.2: ICD-10-CM

## 2021-03-24 DIAGNOSIS — F12.90: ICD-10-CM

## 2021-03-24 DIAGNOSIS — R51.9: Primary | ICD-10-CM

## 2021-03-24 DIAGNOSIS — R07.89: ICD-10-CM

## 2021-03-24 LAB
ALBUMIN SERPL-MCNC: 3.9 G/DL (ref 3.5–5)
ALP SERPL-CCNC: 90 U/L (ref 38–126)
ALT SERPL-CCNC: 9 U/L (ref 0–35)
AMPHETAMINES UR QL: NEGATIVE
ANION GAP SERPL CALC-SCNC: 16.5 MEQ/L (ref 5–15)
APTT PPP: 56.8 SECONDS (ref 25.3–37)
AST SERPL QL: 19 U/L (ref 14–36)
BARBITURATES UR QL: NEGATIVE
BASOPHILS # BLD AUTO: 0.02 10*3/UL (ref 0–0.4)
BASOPHILS NFR BLD AUTO: 0.2 % (ref 0–0.4)
BENZODIAZ UR QL SCN: NEGATIVE
BILIRUB BLD-MCNC: 0.4 MG/DL (ref 0.2–1.3)
BUN SERPL-MCNC: 54 MG/DL (ref 7–17)
CALCIUM SPEC-MCNC: 9.4 MG/DL (ref 8.4–10.2)
CHLORIDE SERPL-SCNC: 97 MMOL/L (ref 98–107)
CO2 SERPL-SCNC: 27 MMOL/L (ref 22–30)
COCAINE UR QL SCN: NEGATIVE
CREAT SERPL-MCNC: 3.76 MG/DL (ref 0.52–1.04)
EOSINOPHIL # BLD AUTO: 0.22 10*3/UL (ref 0–0.5)
ETHANOL SERPL-MCNC: < 10 MG/DL (ref 0–10)
GFR SERPLBLD BASED ON 1.73 SQ M-ARVRAT: 13 ML/MIN
GLUCOSE SERPL-MCNC: 96 MG/DL (ref 74–106)
GLUCOSE UR-MCNC: NEGATIVE MG/DL
HCT VFR BLD AUTO: 33.6 % (ref 35–47)
HGB BLD-MCNC: 10.6 GM/DL (ref 12–16)
INR PPP: 1.15 (ref 0.8–3)
LYMPHOCYTES # SPEC AUTO: 2.24 10*3/UL (ref 1–4.6)
MAGNESIUM SERPL-MCNC: 2.3 MG/DL (ref 1.6–2.3)
MCH RBC QN AUTO: 31.7 PG (ref 26–32)
MCHC RBC AUTO-ENTMCNC: 31.5 G/DL (ref 32–36)
METHADONE UR QL: NEGATIVE
MONOCYTES # BLD AUTO: 0.58 10*3/UL (ref 0–1.3)
OPIATES UR QL: NEGATIVE
PCP UR QL CFM>20 NG/ML: NEGATIVE
PLATELET # BLD AUTO: 104 K/MM3 (ref 150–450)
POTASSIUM SERPLBLD-SCNC: 3.7 MMOL/L (ref 3.5–5.1)
PROT SERPL-MCNC: 7 G/DL (ref 6.3–8.2)
PROT UR STRIP-MCNC: 100 MG/DL
PROTHROMBIN TIME: 13 SECONDS (ref 9.95–12.35)
RBC # BLD AUTO: 3.34 M/MM3 (ref 4.1–5.4)
RBC #/AREA URNS HPF: (no result) /HPF (ref 0–2)
SODIUM SERPL-SCNC: 136 MMOL/L (ref 137–145)
THC UR QL SCN: NEGATIVE
WBC # BLD AUTO: 9.6 K/MM3 (ref 4–10.5)
WBC #/AREA URNS HPF: (no result) /HPF (ref 0–5)

## 2021-03-24 PROCEDURE — 73502 X-RAY EXAM HIP UNI 2-3 VIEWS: CPT

## 2021-03-24 PROCEDURE — 71100 X-RAY EXAM RIBS UNI 2 VIEWS: CPT

## 2021-03-24 PROCEDURE — 72125 CT NECK SPINE W/O DYE: CPT

## 2021-03-24 PROCEDURE — 81001 URINALYSIS AUTO W/SCOPE: CPT

## 2021-03-24 PROCEDURE — 80053 COMPREHEN METABOLIC PANEL: CPT

## 2021-03-24 PROCEDURE — 85025 COMPLETE CBC W/AUTO DIFF WBC: CPT

## 2021-03-24 PROCEDURE — 93041 RHYTHM ECG TRACING: CPT

## 2021-03-24 PROCEDURE — 70450 CT HEAD/BRAIN W/O DYE: CPT

## 2021-03-24 PROCEDURE — 99284 EMERGENCY DEPT VISIT MOD MDM: CPT

## 2021-03-24 PROCEDURE — 87086 URINE CULTURE/COLONY COUNT: CPT

## 2021-03-24 PROCEDURE — 94760 N-INVAS EAR/PLS OXIMETRY 1: CPT

## 2021-03-24 PROCEDURE — G0480 DRUG TEST DEF 1-7 CLASSES: HCPCS

## 2021-03-24 PROCEDURE — 36415 COLL VENOUS BLD VENIPUNCTURE: CPT

## 2021-03-24 PROCEDURE — 93005 ELECTROCARDIOGRAM TRACING: CPT

## 2021-03-24 PROCEDURE — 80307 DRUG TEST PRSMV CHEM ANLYZR: CPT

## 2021-03-24 PROCEDURE — 85730 THROMBOPLASTIN TIME PARTIAL: CPT

## 2021-03-24 PROCEDURE — 84484 ASSAY OF TROPONIN QUANT: CPT

## 2021-03-24 PROCEDURE — 83735 ASSAY OF MAGNESIUM: CPT

## 2021-03-24 PROCEDURE — 36000 PLACE NEEDLE IN VEIN: CPT

## 2021-03-24 PROCEDURE — 85610 PROTHROMBIN TIME: CPT

## 2021-03-24 NOTE — ERPHSYRPT
- History of Present Illness


Time Seen by Provider: 03/24/21 04:00


Source: patient


Exam Limitations: no limitations


Patient Subjective Stated Complaint: pt fell and hit her head and back


Triage Nursing Assessment: pt c/o falling in her apt, fell and hit her head and 

mid back, c/o sharp pain, no open areas or abrasions noted, no hematoma noted.  

P t arrived in c-collar.


Physician History: 





Patient is a 60-year-old female lives independently presents to our ED for 

evaluation status post fall.  Patient states she has not been able to sleep well

last few days.  Patient was walking through her apartment when she fell.  

Patient does not know why she fell.  Patient is unsure whether or not she lost 

consciousness.  Patient arrived via EMS collared nonboarded.  Patient complains 

of neck pain and head pain.  No chest pain.  No shortness of breath.  Patient 

admits that she has not been compliant with her medications.  She has a history 

of end-stage renal disease.  Patient dialysis sessions are Monday Wednesday Friday.  Patient does produce urine.  Patient voices no other complaints 

concerns at this time.  Patient advises that she is on Coumadin however she has 

not regularly compliant with her medication regimen.


Timing/Duration: today


Severity: moderate


Modifying Factors: Improves With: nothing


Associated Symptoms: No nausea, No vomiting, No abdominal pain, No shortness of 

breath, No heartburn, No diaphoresis, No cough, No chills, No chest pain, No 

fever, No headaches, No syncope


Allergies/Adverse Reactions: 








amoxicillin Allergy (Verified 03/24/21 04:03)


   


   unknown reaction 


oxytetracycline [From Terramycin] Allergy (Verified 03/24/21 04:03)


   


oxytetracycline HCl [From Terramycin] Allergy (Verified 03/24/21 04:03)


   


   unknown reaction 


Penicillins Allergy (Verified 03/24/21 04:03)


   


   unknown reaction 





Home Medications: 








Doxepin HCl 10 mg PO HS 12/17/20 [History]


PANTOPRAZOLE 40 mg Tablet*** [Protonix 40MG Tablet***] 40 mg PO DAILY 12/17/20 

[History]


Pramipexole Di-HCl [Mirapex] 1 mg PO EVENING MEAL 12/17/20 [History]


Warfarin Sodium 4 mg PO HS 12/17/20 [History]





Hx Tetanus, Diphtheria Vaccination/Date Given: Yes


Hx Influenza Vaccination/Date Given: Yes


Hx Pneumococcal Vaccination/Date Given: No


Immunizations Up to Date: Yes





Travel Risk





- International Travel


Have you traveled outside of the country in past 3 weeks: No





- Coronavirus Screening


Are you exhibiting any of the following symptoms?: No


Close contact with a COVID-19 positive Pt in past 14-21 Days: No





- Review of Systems


Constitutional: No Symptoms, No Fever, No Chills


Eyes: No Symptoms


Ears, Nose, & Throat: No Symptoms


Respiratory: No Symptoms, No Cough, No Dyspnea


Cardiac: No Symptoms, No Chest Pain, No Edema, No Syncope


Abdominal/Gastrointestinal: No Symptoms, No Abdominal Pain, No Nausea, No 

Vomiting, No Diarrhea


Genitourinary Symptoms: No Symptoms, No Dysuria


Musculoskeletal: No Symptoms, No Back Pain, No Neck Pain


Skin: No Symptoms, No Rash


Neurological: No Symptoms, No Dizziness, No Focal Weakness, No Sensory Changes


Psychological: No Symptoms


Endocrine: No Symptoms


Hematologic/Lymphatic: No Symptoms


Immunological/Allergic: No Symptoms


All Other Systems: Reviewed and Negative





- Past Medical History


Pertinent Past Medical History: Yes


Neurological History: TIA


ENT History: No Pertinent History


Cardiac History: Aneurysm, Congestive Heart Failure, Hypertension, Myocardial 

Infarction (MI)


Respiratory History: CHF, Pneumonia


Endocrine Medical History: No Pertinent History


Musculoskeletal History: Osteoporosis


GI Medical History: GERD


 History: No Pertinent History


Psycho-Social History: Anxiety, Depression


Female Reproductive Disorders: Endometriosis


Other Medical History: LUPUS ANTICOAGULANT DISORDER, GOUT, VENTRICULAR 

ARRHYTHMIA, CKD STAGE 4, OSTEOPOROSIS, HYPERGLYCEMIA, FRACTURE PATELLA 2016, 

dialysis MWF, restless leg syndrome





- Past Surgical History


Past Surgical History: Yes


Neuro Surgical History: No Pertinent History


Cardiac: Cardiac Stent


Respiratory: No Pertinent History


Gastrointestinal: No Pertinent History


Genitourinary: Other


Musculoskeletal: No Pertinent History


Female Surgical History: Hysterectomy


Other Surgical History: CARPAL TUNNEL-RIGHT WRIST, RIGHT FOOT.  Dialysis 

catheter





- Social History


Smoking Status: Current every day smoker


How long have you smoked: 46 years


Exposure to second hand smoke: No


Drug Use: marijuana


Patient Lives Alone: Yes





- Female History


Hx Pregnant Now: No





- Nursing Vital Signs


Nursing Vital Signs: 


                               Initial Vital Signs











Temperature  97.7 F   03/24/21 03:56


 


Pulse Rate  84   03/24/21 03:56


 


Respiratory Rate  22   03/24/21 03:56


 


Blood Pressure  144/74   03/24/21 03:56


 


O2 Sat by Pulse Oximetry  98   03/24/21 03:56








                                   Pain Scale











Pain Intensity                 10

















- Physical Exam


General Appearance: no apparent distress, alert, other (Cervical collar intact.)


Eye Exam: PERRL/EOMI, eyes nml inspection


Ears, Nose, Throat Exam: normal ENT inspection, TMs normal, pharynx normal, 

moist mucous membranes


Neck Exam: normal inspection, non-tender, supple, full range of motion


Respiratory Exam: normal breath sounds, lungs clear, No respiratory distress


Cardiovascular Exam: regular rate/rhythm, normal heart sounds, normal peripheral

 pulses, other (Intact dialysis port.)


Gastrointestinal/Abdomen Exam: soft, normal bowel sounds, No tenderness, No mass


Back Exam: normal inspection, normal range of motion, No CVA tenderness, No v

ertebral tenderness


Extremity Exam: normal inspection, normal range of motion, pelvis stable


Neurologic Exam: alert, oriented x 3, cooperative, normal mood/affect, nml 

cerebellar function, nml station & gait, sensation nml, No motor deficits


Skin Exam: normal color, warm, dry, No rash


Lymphatic Exam: No adenopathy


**SpO2 Interpretation**: normal


SpO2: 98


O2 Delivery: Room Air





- Course


Nursing assessment & vital signs reviewed: Yes


EKG Interpreted by Me: RATE ([id number]), Sinus Rhythm, NORMAL AXIS, NORMAL INTERVALS





- CT Exams


  ** Cervical Spine


CT Interpretation: Tele-radiologist Report (Right IJ catheter noted.  Old right 

clavicular head fracture.  Degenerative changes at C2 and C3 producing mild to 

moderate spinal canal stenosis.  No acute fracture or malalignment of the 

cervical spine.)





  ** Head


CT Interpretation: Tele-radiologist Report (Encephalomalacia, hyperostosis front

kenna interna, no acute findings on CT head.)


Ordered Tests: 


                               Active Orders 24 hr











 Category Date Time Status


 


 Cardiac Monitor STAT Care  03/24/21 04:14 Active


 


 EKG-ER Only STAT Care  03/24/21 04:13 Active


 


 IV Insertion STAT Care  03/24/21 04:13 Active


 


 Pulse Oximetry (ED) STAT Care  03/24/21 04:13 Active


 


 CERVICAL SPINE WO CONTRAST [CT] Stat Exams  03/24/21 04:11 Taken


 


 HEAD WITHOUT CONTRAST [CT] Stat Exams  03/24/21 04:11 Taken


 


 CBC W DIFF Stat Lab  03/24/21 04:37 Completed


 


 CMP Stat Lab  03/24/21 04:37 Completed


 


 CULTURE,URINE Stat Lab  03/24/21 04:41 Received


 


 ETHYL ALCOHOL Stat Lab  03/24/21 04:37 Completed


 


 MAGNESIUM Stat Lab  03/24/21 04:37 Completed


 


 PROTIME WITH INR Stat Lab  03/24/21 04:37 Completed


 


 PTT Stat Lab  03/24/21 04:37 Completed


 


 TROPONIN Q3H Lab  03/24/21 04:37 Completed


 


 TROPONIN Q3H Lab  03/24/21 07:15 Ordered


 


 TROPONIN Q3H Lab  03/24/21 10:15 Ordered


 


 TROPONIN Q3H Lab  03/24/21 13:15 Ordered


 


 TROPONIN Q3H Lab  03/24/21 16:15 Ordered


 


 UA W/RFX UR CULTURE Stat Lab  03/24/21 04:41 Completed


 


 Urine Triage Profile Stat Lab  03/24/21 04:41 Completed











Lab/Rad Data: 


                           Laboratory Result Diagrams





                                 03/24/21 04:37 





                                 03/24/21 04:37 





                               Laboratory Results











  03/24/21 03/24/21 03/24/21 Range/Units





  04:41 04:41 04:37 


 


WBC     (4.0-10.5)  K/mm3


 


RBC     (4.1-5.4)  M/mm3


 


Hgb     (12.0-16.0)  gm/dl


 


Hct     (35-47)  %


 


MCV     ()  fl


 


MCH     (26-32)  pg


 


MCHC     (32-36)  g/dl


 


RDW     (11.5-14.0)  %


 


Plt Count     (150-450)  K/mm3


 


MPV     (7.5-11.0)  fl


 


Gran %     (36.0-66.0)  %


 


Eos # (Auto)     (0-0.5)  


 


Absolute Lymphs (auto)     (1.0-4.6)  


 


Absolute Monos (auto)     (0.0-1.3)  


 


Lymphocytes %     (24.0-44.0)  %


 


Monocytes %     (0.0-12.0)  %


 


Eosinophils %     (0.00-5.0)  %


 


Basophils %     (0.0-0.4)  %


 


Absolute Granulocytes     (1.4-6.9)  


 


Basophils #     (0-0.4)  


 


PT     (9.95-12.35)  SECONDS


 


INR     (0.8-3.0)  


 


APTT     (25.3-37.0)  SECONDS


 


Sodium     (137-145)  mmol/L


 


Potassium     (3.5-5.1)  mmol/L


 


Chloride     ()  mmol/L


 


Carbon Dioxide     (22-30)  mmol/L


 


Anion Gap     (5-15)  MEQ/L


 


BUN     (7-17)  mg/dL


 


Creatinine     (0.52-1.04)  mg/dL


 


Estimated GFR     ML/MIN


 


Glucose     ()  mg/dL


 


Calcium     (8.4-10.2)  mg/dL


 


Magnesium     (1.6-2.3)  mg/dL


 


Total Bilirubin     (0.2-1.3)  mg/dL


 


AST     (14-36)  U/L


 


ALT     (0-35)  U/L


 


Alkaline Phosphatase     ()  U/L


 


Troponin I    0.027  (0.000-0.034)  ng/mL


 


Serum Total Protein     (6.3-8.2)  g/dL


 


Albumin     (3.5-5.0)  g/dL


 


Urine Color   YELLOW   (YELLOW)  


 


Urine Appearance   SLIGHTLY CLOUDY   (CLEAR)  


 


Urine pH   8.0   (5-6)  


 


Ur Specific Gravity   1.016   (1.005-1.025)  


 


Urine Protein   100   (Negative)  


 


Urine Ketones   NEGATIVE   (NEGATIVE)  


 


Urine Blood   NEGATIVE   (0-5)  Mauricio/ul


 


Urine Nitrite   NEGATIVE   (NEGATIVE)  


 


Urine Bilirubin   NEGATIVE   (NEGATIVE)  


 


Urine Urobilinogen   NEGATIVE   (0-1)  mg/dL


 


Ur Leukocyte Esterase   TRACE   (NEGATIVE)  


 


Urine WBC (Auto)   3-5   (0-5)  /HPF


 


Urine RBC (Auto)   0-2   (0-2)  /HPF


 


U Epithel Cells (Auto)   NONE   (FEW)  /HPF


 


Urine Bacteria (Auto)   NONE   (NEGATIVE)  /HPF


 


Urine Culture Reflexed   YES   (NO)  


 


Urine Glucose   NEGATIVE   (NEGATIVE)  mg/dL


 


Urine Opiates Level  NEGATIVE    (NEGATIVE)  


 


Ur Methadone  NEGATIVE    (NEGATIVE)  


 


Urine Barbiturates  NEGATIVE    (NEGATIVE)  


 


Ur Phencyclidine (PCP)  NEGATIVE    (NEGATIVE)  


 


Urine Amphetamine  NEGATIVE    (NEGATIVE)  


 


U Benzodiazepine Level  NEGATIVE    (NEGATIVE)  


 


Urine Cocaine  NEGATIVE    (NEGATIVE)  


 


Urine Marijuana (THC)  NEGATIVE    (NEGATIVE)  


 


Ethyl Alcohol     (0-10)  mg/dL














  03/24/21 03/24/21 03/24/21 Range/Units





  04:37 04:37 04:37 


 


WBC    9.6  (4.0-10.5)  K/mm3


 


RBC    3.34 L  (4.1-5.4)  M/mm3


 


Hgb    10.6 L  (12.0-16.0)  gm/dl


 


Hct    33.6 L  (35-47)  %


 


MCV    100.6 H  ()  fl


 


MCH    31.7  (26-32)  pg


 


MCHC    31.5 L  (32-36)  g/dl


 


RDW    13.3  (11.5-14.0)  %


 


Plt Count    104 L  (150-450)  K/mm3


 


MPV    11.6 H  (7.5-11.0)  fl


 


Gran %    68.0 H  (36.0-66.0)  %


 


Eos # (Auto)    0.22  (0-0.5)  


 


Absolute Lymphs (auto)    2.24  (1.0-4.6)  


 


Absolute Monos (auto)    0.58  (0.0-1.3)  


 


Lymphocytes %    23.4 L  (24.0-44.0)  %


 


Monocytes %    6.1  (0.0-12.0)  %


 


Eosinophils %    2.3  (0.00-5.0)  %


 


Basophils %    0.2  (0.0-0.4)  %


 


Absolute Granulocytes    6.50  (1.4-6.9)  


 


Basophils #    0.02  (0-0.4)  


 


PT  13.0 H    (9.95-12.35)  SECONDS


 


INR  1.15    (0.8-3.0)  


 


APTT  56.8 H    (25.3-37.0)  SECONDS


 


Sodium   136 L   (137-145)  mmol/L


 


Potassium   3.7   (3.5-5.1)  mmol/L


 


Chloride   97 L   ()  mmol/L


 


Carbon Dioxide   27   (22-30)  mmol/L


 


Anion Gap   16.5 H   (5-15)  MEQ/L


 


BUN   54 H   (7-17)  mg/dL


 


Creatinine   3.76 H   (0.52-1.04)  mg/dL


 


Estimated GFR   13.0   ML/MIN


 


Glucose   96   ()  mg/dL


 


Calcium   9.4   (8.4-10.2)  mg/dL


 


Magnesium   2.3   (1.6-2.3)  mg/dL


 


Total Bilirubin   0.40   (0.2-1.3)  mg/dL


 


AST   19   (14-36)  U/L


 


ALT   9   (0-35)  U/L


 


Alkaline Phosphatase   90   ()  U/L


 


Troponin I     (0.000-0.034)  ng/mL


 


Serum Total Protein   7.0   (6.3-8.2)  g/dL


 


Albumin   3.9   (3.5-5.0)  g/dL


 


Urine Color     (YELLOW)  


 


Urine Appearance     (CLEAR)  


 


Urine pH     (5-6)  


 


Ur Specific Gravity     (1.005-1.025)  


 


Urine Protein     (Negative)  


 


Urine Ketones     (NEGATIVE)  


 


Urine Blood     (0-5)  Mauricio/ul


 


Urine Nitrite     (NEGATIVE)  


 


Urine Bilirubin     (NEGATIVE)  


 


Urine Urobilinogen     (0-1)  mg/dL


 


Ur Leukocyte Esterase     (NEGATIVE)  


 


Urine WBC (Auto)     (0-5)  /HPF


 


Urine RBC (Auto)     (0-2)  /HPF


 


U Epithel Cells (Auto)     (FEW)  /HPF


 


Urine Bacteria (Auto)     (NEGATIVE)  /HPF


 


Urine Culture Reflexed     (NO)  


 


Urine Glucose     (NEGATIVE)  mg/dL


 


Urine Opiates Level     (NEGATIVE)  


 


Ur Methadone     (NEGATIVE)  


 


Urine Barbiturates     (NEGATIVE)  


 


Ur Phencyclidine (PCP)     (NEGATIVE)  


 


Urine Amphetamine     (NEGATIVE)  


 


U Benzodiazepine Level     (NEGATIVE)  


 


Urine Cocaine     (NEGATIVE)  


 


Urine Marijuana (THC)     (NEGATIVE)  


 


Ethyl Alcohol   < 10   (0-10)  mg/dL














- Progress


Progress: improved


Progress Note: 


Patient reassessed.  She feels well.  Patient requesting discharge.  At this 

point we are not clear why patient fell at home.  We planned on admitting 

patient.  Case discussed with Dr. Gibbs who advised discharge.  Patient wants 

to go home.  Per patient's request and  advised we will discharge 

patient home.  CT head negative for acute pathology.  CT cervical spine negative

 for acute pathology.  Repeat neuro exam essentially nonremarkable.  Patient has

 end-stage renal disease.  On dialysis.  Patient has not missed any dialysis 

sessions.  Potassium is within normal limits.  Patient agrees to follow-up with 

primary care doctor within 48 hours for reevaluation.


03/24/21 06:17





Discussed with : Farhad


Will see patient in: other


Counseled pt/family regarding: lab results, diagnosis, need for follow-up, rad 

results





- Departure


Departure Disposition: Home


Clinical Impression: 


 Fall, Non compliance w medication regimen, ESRD (end stage renal disease), Kristy

aloblastic anemia, Thrombocytopenia, Cervical stenosis of spinal canal, 

Encephalomalacia, Hyperostosis frontalis interna





Condition: Stable


Critical Care Time: No


Referrals: 


JESSA CONNER MD [Primary Care Provider] - 


Additional Instructions: 


Discharge/Care Plan





TOBY PAT was seen on 03/24/21 in the Emergency Room. The patient was

counseled regarding Diagnosis,Lab results, Imaging studies, need for follow up 

and when to return to the Emergency Room.





Prescriptions given:





Discharge Note





I have spoken with the patient and/or caregivers. I have explained the patient's

condition, diagnosis and treatment plan based on the information available to me

at this time. I have answered the patient's and/or caregiver's questions and 

addressed any concerns. The patient and/or caregivers have as good understanding

of the patient's diagnosis, condition and treatment plan as can be expected at 

this point. The vital signs have been stable. The patient's condition is stable 

and appropriate for discharge from the emergency department.





The patient will pursue further outpatient evaluation with the primary care 

physician or other designated or consulting physician as outlined in the 

discharge instructions. The patient and/or caregivers are agreeable to this plan

of care and follow-up instructions have been explained in detail. The patient 

and/or caregivers have received these instruction. The patient/and or caregivers

are aware that any significant change in condition or worsening of symptoms 

should prompt an immediate return to this or the closest emergency department or

call 911.

## 2021-03-24 NOTE — XRAY
Indication: Pain following fall.



Multiple contiguous axial images obtained through the head without contrast.



Comparison: August 23, 2020.



Grossly stable global atrophy, moderate periventricular degenerative

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  No acute

intracranial hemorrhage, hydrocephalus, or mass effect.  Fourth ventricle is

midline.  Small left occipital scalp hematoma.  Bony calvarium intact.

Visualized paranasal sinuses and mastoid air cells are clear.



Impression:

1.  Small left occipital scalp hematoma.  No underlying fracture or acute

intracranial abnormalities.

2.  Stable atrophy, degenerative micro-ischemia, and old bilateral occipital

infarcts.



Comment: Preliminary interpretation was made by [facility].  No critical discrepancy.

## 2021-03-24 NOTE — ERPHSYRPT
- History of Present Illness


Time Seen by Provider: 03/24/21 18:58


Historian: patient


Exam Limitations: no limitations


Patient Subjective Stated Complaint: Pt c/o of pain below her right hip, pt was 

discharged this AM from this ER due to a fall and Dr. Gibbs stated that she 

did not need to be admitted and so she was discharged


Triage Nursing Assessment: Pt brought to ER by EMS, vitals wnl, rates pain as 

5/10, reports being in pain off and on all day since she was discharged, pulses 

normal, skin n/w/d, pt states that she went to dialysis today


Physician History: 





60 years old female with multiple medical problems including end-stage renal 

disease on dialysis 3 times a week who was evaluated in the ER earlier this 

morning for fall with head and neck pain with negative CTs for acute pathology 

and was discharged presented back with pain right nostril lateral mid chest and 

right hip with movements and palpation small, moderate intensity, sharp nature 

without any obvious difficulty breathing.  Denies any abdominal pain nausea or 

vomiting.  Patient reports she cannot take this pain and needs some pain 

medication.  Her head and neck pain are better.  No numbness tingling or 

weakness.  Denies any dizziness or lightheadedness.


Timing/Duration: today, sudden, worse


Quality: sharpness


Severity of Pain-Max: moderate


Severity of Pain-Current: moderate


Modifying Factors: Worsens With: coughing, movement, palpation


Associated Symptoms: back pain


Nitro Today/Relief: no nitro taken today


Aspirin Treatment Today: no aspirin today


Allergies/Adverse Reactions: 








amoxicillin Allergy (Verified 03/24/21 04:03)


   


   unknown reaction 


oxytetracycline [From Terramycin] Allergy (Verified 03/24/21 04:03)


   


oxytetracycline HCl [From Terramycin] Allergy (Verified 03/24/21 04:03)


   


   unknown reaction 


Penicillins Allergy (Verified 03/24/21 04:03)


   


   unknown reaction 





Home Medications: 








Doxepin HCl 10 mg PO HS 12/17/20 [History]


PANTOPRAZOLE 40 mg Tablet*** [Protonix 40MG Tablet***] 40 mg PO DAILY 12/17/20 

[History]


Pramipexole Di-HCl [Mirapex] 1 mg PO EVENING MEAL 12/17/20 [History]


Warfarin Sodium 4 mg PO HS 12/17/20 [History]





Hx Tetanus, Diphtheria Vaccination/Date Given: Yes


Hx Influenza Vaccination/Date Given: Yes


Hx Pneumococcal Vaccination/Date Given: No





Travel Risk





- International Travel


Have you traveled outside of the country in past 3 weeks: No





- Coronavirus Screening


Are you exhibiting any of the following symptoms?: No


Close contact with a COVID-19 positive Pt in past 14-21 Days: No





- Review of Systems


Constitutional: No Symptoms


Eyes: No Symptoms


Ears, Nose, & Throat: No Symptoms


Respiratory: No Symptoms


Cardiac: Chest Pain


Abdominal/Gastrointestinal: No Symptoms


Genitourinary Symptoms: No Symptoms


Musculoskeletal: Fall


Skin: No Symptoms


Neurological: No Symptoms


Psychological: No Symptoms


Endocrine: No Symptoms


Hematologic/Lymphatic: No Symptoms


Immunological/Allergic: No Symptoms





- Past Medical History


Pertinent Past Medical History: Yes


Neurological History: TIA


ENT History: No Pertinent History


Cardiac History: Aneurysm, Congestive Heart Failure, Hypertension, Myocardial 

Infarction (MI)


Respiratory History: CHF, Pneumonia


Endocrine Medical History: No Pertinent History


Musculoskeletal History: Osteoporosis


GI Medical History: GERD


 History: No Pertinent History


Psycho-Social History: Anxiety, Depression


Female Reproductive Disorders: Endometriosis


Other Medical History: LUPUS ANTICOAGULANT DISORDER, GOUT, VENTRICULAR 

ARRHYTHMIA, CKD STAGE 4, OSTEOPOROSIS, HYPERGLYCEMIA, FRACTURE PATELLA 2016, 

dialysis MWF, restless leg syndrome





- Past Surgical History


Past Surgical History: Yes


Neuro Surgical History: No Pertinent History


Cardiac: Cardiac Stent


Respiratory: No Pertinent History


Gastrointestinal: No Pertinent History


Genitourinary: Other


Musculoskeletal: No Pertinent History


Female Surgical History: Hysterectomy


Other Surgical History: CARPAL TUNNEL-RIGHT WRIST, RIGHT FOOT.  Dialysis 

catheter





- Social History


Smoking Status: Current every day smoker


How long have you smoked: 46 years


Exposure to second hand smoke: No


Drug Use: marijuana


Patient Lives Alone: Yes





- Female History


Hx Pregnant Now: No





- Nursing Vital Signs


Nursing Vital Signs: 


                               Initial Vital Signs











Temperature  97.9 F   03/24/21 18:55


 


Pulse Rate  94 H  03/24/21 18:55


 


Blood Pressure  116/65   03/24/21 18:55


 


O2 Sat by Pulse Oximetry  96   03/24/21 18:55








                                   Pain Scale











Pain Intensity [Right Lateral  5





Hip]                           


 


Pain Intensity                 7

















- Physical Exam


General Appearance: no apparent distress, alert


Eye Exam: PERRL/EOMI, eyes nml inspection


Ears, Nose, Throat Exam: normal ENT inspection, TMs normal, pharynx normal


Neck Exam: normal inspection, non-tender, supple, full range of motion


Respiratory Exam: normal breath sounds, chest tenderness (Right mid posterior 

lateral chest wall), lungs clear


Cardiovascular Exam: regular rate/rhythm, normal heart sounds


Gastrointestinal/Abdomen Exam: soft, normal bowel sounds, No tenderness


Back Exam: normal inspection, other (Pain right hip area.  Intact range of 

motion.), No CVA tenderness, No vertebral tenderness


Extremity Exam: normal inspection, pelvis stable


Neurologic Exam: alert, oriented x 3, cooperative, CNs II-XII nml as tested, 

sensation nml, No motor deficits


Skin Exam: normal color


**SpO2 Interpretation**: normal


SpO2: 96


O2 Delivery: Room Air


Ordered Tests: 


                               Active Orders 24 hr











 Category Date Time Status


 


 HIP UNI (2V) INCL PEL IF DONE Stat Exams  03/24/21 20:26 Taken


 


 RIBS UNILATERAL Stat Exams  03/24/21 20:26 Taken








Medication Summary














Discontinued Medications














Generic Name Dose Route Start Last Admin





  Trade Name Mariann  PRN Reason Stop Dose Admin


 


Hydrocodone Bitart/Acetaminophen  2 tab  03/24/21 21:08  03/24/21 21:10





  Norco 5/325 Mg***  PO  03/24/21 21:09  2 tab





  SENT HOME W/ PATIENT ONE   Administration


 


Hydrocodone Bitart/Acetaminophen  Confirm  03/24/21 21:07 





  Norco 5/325 Mg***  Administered  03/24/21 21:08 





  Dose  





  2 tab  





  .ROUTE  





  .STK-MED ONE  


 


Oxycodone/Acetaminophen  1 tab  03/24/21 19:40  03/24/21 19:42





  Percocet Tablet 5/325mg***  PO  03/24/21 19:41  1 tab





  STAT ONE   Administration


 


Oxycodone/Acetaminophen  Confirm  03/24/21 19:41 





  Percocet Tablet 5/325mg***  Administered  03/24/21 19:42 





  Dose  





  1 tab  





  .ROUTE  





  .STK-MED ONE  














- Progress


Progress: improved, re-examined


Air Movement: good


Progress Note: 





03/24/21 21:08


60 years old was evaluated this morning for headache and neck pain with negative

CTs and was discharged presented back and evaluated for right mid to lower chest

wall pain and right hip pain.  She is given oral pain medication, on 

reevaluation feeling better.  I have obtained rib series which showed 

nondisplaced 10th rib fracture and no acute osseous finding in the x-rays right 

hip.  Patient had negative work-up otherwise this morning and has no difficulty 

breathing, no vomiting or diarrhea or any other new complaint.  I do not think 

she needs to repeat any blood work and is given pain medication to take as 

needed and outpatient follow-up recommended.  Discussed signs symptoms of 

worsening needing return to ER which she seems understanding.


Blood Culture(s) Obtained: No


Antibiotics given: No


Counseled pt/family regarding: diagnosis, need for follow-up, rad results





- Departure


Departure Disposition: Home


Clinical Impression: 


 Closed rib fracture, Fall





Condition: Stable


Critical Care Time: No


Referrals: 


JESSA CONNER MD [Primary Care Provider] -  (1-2 days for reevaluation)


Instructions:  Rib Fracture (DC)


Additional Instructions: 


Take pain medications as needed.  Do deep breathing exercises.  Use cane or 

walker for ambulation to avoid another fall.  Keep appointment for your 

dialysis.  Follow-up with primary care for reevaluation.  Return to ER for 

worsening pain or if have difficulty breathing, abdominal pain nausea vomiting 

etc.


Prescriptions: 


Hydrocodone/APAP 5/325*** [Norco 5/325 mg***] 1 each PO Q6H PRN PRN 3 Days #12 

tablet MDD 4


 PRN Reason: Pain

## 2021-03-24 NOTE — XRAY
Indication: Head and neck pain following fall.



Multiple contiguous images obtained through the cervical spine.  Sagittal and

coronal reformatted images obtained.



Comparison: August 23, 2020.



Axial images through the cervical spine remain negative for acute fracture,

suspicious bony lesions, or spinal canal stenosis.  Stable mild multilevel

degenerative facet hypertrophy.  New finding nondisplaced partial healing

right clavicle head fracture.



Sagittal and coronal reformatted images again demonstrates normal alignment

with vertebral body heights/disc spaces maintained.  No acute compression

fracture, subluxation, or jumped facet.  Normal appearing craniocervical

junction.



Visualized noncontrasted soft tissues again demonstrates scattered carotid

calcifications bilaterally, benign chunky right thyroid calcification, and

incompletely visualized right dialysis catheter.



Impression:

1.  New healing nondisplaced right clavicle head fracture.

2.  Negative for cervical fracture/subluxation.

3.  Stable multilevel degenerative facet hypertrophy and benign right thyroid

calcification.



Comment: Preliminary interpretation was made by [facility].  No critical discrepancy.

## 2021-03-25 NOTE — XRAY
Indication: Pain following fall.



Comparison: March 27, 2020.



2 view right ribs demonstrates new nondisplaced 10th rib fracture without

pneumo/hemothorax.  Also new right large bore dialysis catheter.  Stable

osteopenia, multilevel degenerative spondylosis, AC degenerative arthropathy,

right lung base subsegmental atelectasis/scarring, pulmonary/mediastinal

calcified granulomas, and aortic calcifications.

## 2021-03-25 NOTE — XRAY
Indication: Right hip pain following fall.



Comparison: January 2, 2020.



AP pelvis and 2 view right hip again demonstrates osteopenia and heavy

scattered vascular calcifications.  No new/acute bony, articular, or soft

tissue abnormalities.

## 2021-03-29 ENCOUNTER — HOSPITAL ENCOUNTER (EMERGENCY)
Dept: HOSPITAL 33 - ED | Age: 61
Discharge: HOME | End: 2021-03-29
Payer: MEDICARE

## 2021-03-29 VITALS — SYSTOLIC BLOOD PRESSURE: 129 MMHG | DIASTOLIC BLOOD PRESSURE: 66 MMHG

## 2021-03-29 VITALS — HEART RATE: 72 BPM

## 2021-03-29 VITALS — OXYGEN SATURATION: 97 %

## 2021-03-29 DIAGNOSIS — R51.9: ICD-10-CM

## 2021-03-29 DIAGNOSIS — I25.2: ICD-10-CM

## 2021-03-29 DIAGNOSIS — S22.31XA: Primary | ICD-10-CM
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
## 2021-03-29 DIAGNOSIS — Z99.2: ICD-10-CM

## 2021-03-29 DIAGNOSIS — S42.001A: ICD-10-CM

## 2021-03-29 DIAGNOSIS — M54.2: ICD-10-CM

## 2021-03-29 DIAGNOSIS — I10: ICD-10-CM

## 2021-03-29 DIAGNOSIS — I50.9: ICD-10-CM

## 2021-03-29 PROCEDURE — 99284 EMERGENCY DEPT VISIT MOD MDM: CPT

## 2021-03-29 PROCEDURE — 71250 CT THORAX DX C-: CPT

## 2021-03-29 RX ADMIN — HYDROCODONE BITARTRATE AND ACETAMINOPHEN STA TABLET: 10; 325 TABLET ORAL at 05:20

## 2021-03-29 RX ADMIN — HYDROCODONE BITARTRATE AND ACETAMINOPHEN ONE TAB: 5; 325 TABLET ORAL at 09:46

## 2021-03-29 NOTE — ERPHSYRPT
- History of Present Illness


Patient Subjective Stated Complaint: Patient states " I called 911 because I am 

in so much pain on my right side I can't stand it and I wasn't sure what 

medicine was my pain meds".


Triage Nursing Assessment: Patient arrived to ED per EMS. Patient one assist 

with transfer to bed. Patient A/O times 4. Patient able to answer questions 

without difficulty. Patient unable to tell writer when the last time she took 

her meds. Lungs diminished A/P throughout. Patient denies chest pain. Patient 

denies SOB. Patient was recently in ER times 2 on 3/24/21 R/T fall at home and 

patient with FX right 10th rib and healing clavicle FX. Patient was sent home 

with pain meds and patient states she hasn't taken any because she didn't know 

which one to take. Patient noted to be in same hospital gown from her visit on 

the 24th. Patient hasn't changed clothes since. + BS times 4 quads. ABD soft, 

round, non-distended. Patient with pain and tenderness when RN touches right 

upper side. Patient noted with dialysis port left chest. Patient noted with non-

pitting edema in bilateral lower extremities. + Pedal and radial pulses noted. 

Patient with HX of falls. Patient with limited ROM to right side R/T pain. Left 

side WNL. Patient denies any fall since last fall on 3/24/21.


Occurred: days ago (5)


Injuries/Pain Location: chest


Loss of Consciousness: no loss of consciousness


Quality: sharpness, stabbing, throbbing


Severity of Pain-Max: severe


Severity of Pain-Current: moderate


Modifying Factors: Improves With: movement


Associated Symptoms (Fall): chest pain


Hx Tetanus, Diphtheria Vaccination/Date Given: Yes


Hx Influenza Vaccination/Date Given: Yes


Hx Pneumococcal Vaccination/Date Given: No


Immunizations Up to Date: Yes





- History of Present Illness


Time Seen by Provider: 03/29/21 05:25


Physician History: 





Patient is a 60-year-old dialysis patient who suffered a fall on the 24th was 

seen in the ER at that time complaining of head and neck pain had a CT of her 

head a CT of her neck both of which were essentially negative she returned 

within 24 hours on the 24th complaining of pain in the right side of the chest 

x-rays at that time revealed a nondisplaced fracture acute of the right 10th rib

and also a new fracture of the right clavicular head.  She was treated with 

[facility] 5/3/2025's and discharged home she returns this morning with a complaint 

of severe pain.  She denies shortness of breath cough fever chills or sweats but

says she is hurting bad. (MALA PIERRE)


Allergies/Adverse Reactions: 








amoxicillin Allergy (Verified 03/29/21 05:09)


   


   unknown reaction 


oxytetracycline [From Terramycin] Allergy (Verified 03/29/21 05:09)


   


oxytetracycline HCl [From Terramycin] Allergy (Verified 03/29/21 05:09)


   


   unknown reaction 


Penicillins Allergy (Verified 03/29/21 05:09)


   


   unknown reaction 





Home Medications: 








Pramipexole Di-HCl [Mirapex] 1 mg PO EVENING MEAL 12/17/20 [History]


RX: Doxepin HCl 10 mg PO HS 12/17/20 [History]


RX: PANTOPRAZOLE 40 mg Tablet*** [Protonix 40MG Tablet***] 40 mg PO DAILY 

12/17/20 [History]


RX: Warfarin Sodium 4 mg PO HS 12/17/20 [History]








Travel Risk





- International Travel


Have you traveled outside of the country in past 3 weeks: No





- Coronavirus Screening


Are you exhibiting any of the following symptoms?: No


Close contact with a COVID-19 positive Pt in past 14-21 Days: No





- Review of Systems


Constitutional: No Fever, No Chills


Eyes: No Symptoms


Ears, Nose, & Throat: No Symptoms


Respiratory: No Cough, No Dyspnea


Cardiac: No Chest Pain, No Edema, No Syncope


Abdominal/Gastrointestinal: No Abdominal Pain, No Nausea, No Vomiting, No 

Diarrhea


Genitourinary Symptoms: No Dysuria


Musculoskeletal: No Back Pain, No Neck Pain


Skin: No Rash


Neurological: No Dizziness, No Focal Weakness, No Sensory Changes


Psychological: No Symptoms


Endocrine: No Symptoms


All Other Systems: Reviewed and Negative





- Past Medical History


Pertinent Past Medical History: Yes


Neurological History: TIA


ENT History: No Pertinent History


Cardiac History: Aneurysm, Congestive Heart Failure, Hypertension, Myocardial 

Infarction (MI)


Respiratory History: CHF, Pneumonia


Endocrine Medical History: No Pertinent History


Musculoskeletal History: Osteoporosis


GI Medical History: GERD


 History: No Pertinent History


Psycho-Social History: Anxiety, Depression


Female Reproductive Disorders: Endometriosis


Other Medical History: LUPUS ANTICOAGULANT DISORDER, GOUT, VENTRICULAR 

ARRHYTHMIA, CKD STAGE 4, OSTEOPOROSIS, HYPERGLYCEMIA, FRACTURE PATELLA 2016, 

Dialysis MWF, Restless Leg Syndrome





- Past Surgical History


Past Surgical History: Yes


Neuro Surgical History: No Pertinent History


Cardiac: Cardiac Stent


Respiratory: No Pertinent History


Gastrointestinal: No Pertinent History


Genitourinary: Other


Musculoskeletal: No Pertinent History


Female Surgical History: Hysterectomy


Other Surgical History: CARPAL TUNNEL-RIGHT WRIST, RIGHT FOOT.  Dialysis 

catheter





- Social History


Smoking Status: Current every day smoker


How long have you smoked: 46 years


Exposure to second hand smoke: No


Drug Use: marijuana


Patient Lives Alone: Yes





- Female History


Hx Last Menstrual Period: POST





- Clifton Coma Score


Best Eye Response (Clifton): (4) open spontaneously


Best Verbal Response (Jaron): (5) oriented


Best Motor Response (Jaron): (6) obeys commands


Jaron Total: 15





- Physical Exam


General Appearance: moderate distress, alert


Head Injury: no evidence of injury


Eye Exam: PERRL/EOMI


ENT Exam: airway nml


Neck Exam: normal inspection, No tenderness


Respiratory/Chest Exam: chest tenderness, normal breath sounds, rib tenderness 

(10th rib), No respiratory distress


Cardiovascular Exam: normal heart sounds, regular rate/rhythm


Gastrointestinal Exam: soft, No tenderness, No distention, No guarding, No 

ecchymosis


Back Exam: normal inspection, No vertebral tenderness


Extremity Exam: normal inspection, normal range of motion, pelvis stable, No 

deformities


Peripheral Pulses: carotid (R): 2+, carotid (L): 2+


Neurologic Exam: alert, oriented x 3, cooperative, sensation nml, No motor 

deficits


Skin Exam: normal color, warm, dry


**SpO2 Interpretation**: normal


SpO2: 97


O2 Delivery: Room Air





- Nursing Vital Signs


Nursing Vital Signs: 





                               Initial Vital Signs











Temperature  97.4 F   03/29/21 05:08


 


Pulse Rate  78   03/29/21 05:08


 


Respiratory Rate  20   03/29/21 05:08


 


Blood Pressure  142/74   03/29/21 05:08


 


O2 Sat by Pulse Oximetry  99   03/29/21 05:08








                                   Pain Scale











Pain Intensity                 9

















- Course


Nursing assessment & vital signs reviewed: Yes


Ordered Tests: 





                               Active Orders 24 hr











 Category Date Time Status


 


 CHEST WITHOUT CONTRAST [CT] Stat Exams  03/29/21 05:13 Taken








Medication Summary














Discontinued Medications














Generic Name Dose Route Start Last Admin





  Trade Name Freq  PRN Reason Stop Dose Admin


 


Hydrocodone Bitart/Acetaminophen  1 tablet  03/29/21 05:37  03/29/21 05:20





  Hydrocodone-Acetamin  Mg***  PO  03/29/21 05:38  1 tablet





  Q4H PRN STA   Administration














- Progress


Progress: improved, pain not gone completely, re-examined


Counseled pt/family regarding: diagnosis, need for follow-up, rad results





- Progress


Progress Note: 





03/29/21 07:13


CAT scan of the chest without contrast shows a persistent acute nondisplaced 

fracture of the right 10th and 11th ribs.  This was present on CAT scan dated 

3/24/2021.  There is a healed sternal fracture and healed/buckled rib fracture 

bilaterally there is also possible acute nondisplaced fractures of the anterior 

right fifth or anterior left sixth rib.  There is no pneumothorax.  





Medical decision making: This patient returns for her third visit since her 

initial visit on March 24, 2021.  I am not sure that this patient is 

understanding that when she has pain she is to use her pain medicine as 

prescribed.  She did not take her pain medicine because she stated she was not 

sure which when she was supposed to take.  Patient has the persistent rib 

fractures as described above.  I did explain to her that she is going to have 

pain for several weeks.  I recommended that she call her primary care doctor or 

any pain specialist today to make arrangements for follow-up appointment so that

 she does not run out of her pain medication.  She will require this, I believe,

 for the next 2 to 3 weeks.





 (MALA PIERRE)





- Departure


Departure Disposition: Home


Critical Care Time: No





- Departure


Clinical Impression: 


 Rib fractures, Closed rib fracture





Condition: Stable


Referrals: 


JESSA CONNER MD [Primary Care Provider] - 


Additional Instructions: 


Take your pain medicine as prescribed.  Call your primary care physician and any

pain specialist today to make arrangements for follow-up appointment and to 

manage your outpatient pain.

## 2021-03-29 NOTE — XRAY
Indication: Right 10th rib fracture on recent radiograph.  Increasing pain.



Multiple contiguous axial images obtained through the chest without contrast.



Comparison: August 23, 2020.



Lungs again demonstrates mild bilateral dependent atelectasis less than

before.  New bibasilar subsegmental atelectasis.  Stable bibasilar calcified

granulomas.  No infiltrate, effusion, or pneumothorax.  Heart remains

enlarged.  Aorta remains mildly etcher sclerotic without aneurysm.  Stable

right large bore dialysis catheter and small bilateral hilar calcified nodes.

No pathologic mediastinal lymphadenopathy.



Bony thorax demonstrates new nondisplaced right anterolateral 6th and

posterior right 10th/11th rib fractures.  Old sternum fracture not previously

reported.  Stable minimal degenerative changes throughout the spine, T10

vertebral hemangioma, and T11 Schmorl node.



Limited upper abdomen again demonstrates splenic calcified granulomas.



Impression:

1.  New right 6th/10th/11th acute rib fractures without

hemothorax/pneumothorax.

2.  Stable cardiomegaly without CHF, right dialysis catheter, old

granulomatous disease, and chronic bony findings.



Comment: Preliminary interpretation was made by [facility].  No critical discrepancy.

## 2021-03-30 ENCOUNTER — HOSPITAL ENCOUNTER (EMERGENCY)
Dept: HOSPITAL 33 - ED | Age: 61
Discharge: ANOTHER HOSPITAL | End: 2021-03-30
Payer: MEDICARE

## 2021-03-30 VITALS — OXYGEN SATURATION: 98 %

## 2021-03-30 VITALS — SYSTOLIC BLOOD PRESSURE: 132 MMHG | HEART RATE: 91 BPM | DIASTOLIC BLOOD PRESSURE: 67 MMHG

## 2021-03-30 DIAGNOSIS — I10: ICD-10-CM
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
## 2021-03-30 DIAGNOSIS — M25.551: ICD-10-CM
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
## 2021-03-30 DIAGNOSIS — K21.9: ICD-10-CM
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
## 2021-03-30 DIAGNOSIS — Z72.0: ICD-10-CM

## 2021-03-30 DIAGNOSIS — M81.0: ICD-10-CM

## 2021-03-30 DIAGNOSIS — S72.001A: Primary | ICD-10-CM

## 2021-03-30 DIAGNOSIS — M79.641: ICD-10-CM
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
## 2021-03-30 DIAGNOSIS — I50.9: ICD-10-CM
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
## 2021-03-30 DIAGNOSIS — I25.2: ICD-10-CM

## 2021-03-30 DIAGNOSIS — F41.8: ICD-10-CM

## 2021-03-30 DIAGNOSIS — S60.221A: ICD-10-CM

## 2021-03-30 PROCEDURE — 73130 X-RAY EXAM OF HAND: CPT

## 2021-03-30 PROCEDURE — 99285 EMERGENCY DEPT VISIT HI MDM: CPT

## 2021-03-30 PROCEDURE — 96372 THER/PROPH/DIAG INJ SC/IM: CPT

## 2021-03-30 PROCEDURE — 74176 CT ABD & PELVIS W/O CONTRAST: CPT

## 2021-03-30 NOTE — ERPHSYRPT
- History of Present Illness


Source: patient, EMS


Exam Limitations: no limitations


Patient Subjective Stated Complaint: Fall-right hip and right hand pain


Triage Nursing Assessment: Patient brought back to ED via EMS and transferred to

bed with assist of 3. Patient A+O X3. Patient's skin pink, warm and dry. Patient

complains of fall prior to coming into ED where she tripped on her shoes causing

her to land on her right hip and right hand. No shortening or external/internal 

rotation noted. No bruising or visible injuries noted.


Physician History: 





59 yo wf w R hip/R hand pain after falling in lobby in her apartment building. 

She denies LOC/HA/C-Tspine pain. Pain is 10 on scale. Pt denies syncope/chest 

pain/dyspnea/melena/hematochezia.


Occurred: just prior to arrival


Reason for Fall: tripped (Tripped on her flip flop)


Injuries/Pain Location: lower extremity (R hip/R hand)


Loss of Consciousness: no loss of consciousness


Quality: aching


Severity of Pain-Max: severe


Severity of Pain-Current: severe


Modifying Factors: Improves With: movement


Associated Symptoms (Fall): extremity injury, No abdominal pain, No back pain, 

No confusion, No chest pain, No dizziness, No headache, No lightheadedness, No 

muscle spasms, No nausea, No neck pain, No ringing in ears, No seizures, No 

shortness of breath, No slurred speech, No trouble walking, No vomiting, No 

vision changes


Allergies/Adverse Reactions: 








amoxicillin Allergy (Verified 03/30/21 18:09)


   


   unknown reaction 


oxytetracycline [From Terramycin] Allergy (Verified 03/30/21 18:09)


   


oxytetracycline HCl [From Terramycin] Allergy (Verified 03/30/21 18:09)


   


   unknown reaction 


Penicillins Allergy (Verified 03/30/21 18:09)


   


   unknown reaction 





Home Medications: 








Doxepin HCl 10 mg PO HS 12/17/20 [History]


PANTOPRAZOLE 40 mg Tablet*** [Protonix 40MG Tablet***] 40 mg PO DAILY 12/17/20 

[History]


Pramipexole Di-HCl [Mirapex] 1 mg PO EVENING MEAL 12/17/20 [History]


Warfarin Sodium 4 mg PO HS 12/17/20 [History]





Hx Tetanus, Diphtheria Vaccination/Date Given: Yes


Hx Influenza Vaccination/Date Given: Yes


Hx Pneumococcal Vaccination/Date Given: No





Travel Risk





- International Travel


Have you traveled outside of the country in past 3 weeks: No





- Coronavirus Screening


Are you exhibiting any of the following symptoms?: No


Close contact with a COVID-19 positive Pt in past 14-21 Days: No





- Vaccine Status


Have you recieved a Covid-19 vaccination: Yes


: Unknown





- Vaccination Dates


Dates if Unknown: unknown





- Review of Systems


Constitutional: No Symptoms


Eyes: No Symptoms


Ears, Nose, & Throat: No Symptoms


Respiratory: No Symptoms


Cardiac: No Symptoms


Abdominal/Gastrointestinal: No Symptoms


Genitourinary Symptoms: No Symptoms


Musculoskeletal: Arthralgias


Skin: No Symptoms


Neurological: No Symptoms


Psychological: No Symptoms


Endocrine: No Symptoms


Hematologic/Lymphatic: No Symptoms


Immunological/Allergic: No Symptoms





- Past Medical History


Pertinent Past Medical History: Yes


Neurological History: TIA


ENT History: No Pertinent History


Cardiac History: Aneurysm, Congestive Heart Failure, Hypertension, Myocardial 

Infarction (MI)


Respiratory History: CHF, Pneumonia


Endocrine Medical History: No Pertinent History


Musculoskeletal History: Osteoporosis


GI Medical History: GERD


 History: No Pertinent History


Psycho-Social History: Anxiety, Depression


Female Reproductive Disorders: Endometriosis


Other Medical History: LUPUS ANTICOAGULANT DISORDER, GOUT, VENTRICULAR 

ARRHYTHMIA, CKD STAGE 4, OSTEOPOROSIS, HYPERGLYCEMIA, FRACTURE PATELLA 2016, 

Dialysis MWF, Restless Leg Syndrome





- Past Surgical History


Past Surgical History: Yes


Neuro Surgical History: No Pertinent History


Cardiac: Cardiac Stent


Respiratory: No Pertinent History


Gastrointestinal: No Pertinent History


Genitourinary: Other


Musculoskeletal: No Pertinent History


Female Surgical History: Hysterectomy


Other Surgical History: CARPAL TUNNEL-RIGHT WRIST, RIGHT FOOT.  Dialysis 

catheter





- Social History


Smoking Status: Current every day smoker


How long have you smoked: 46 years


Exposure to second hand smoke: No


Drug Use: marijuana


Patient Lives Alone: Yes





- Female History


Hx Pregnant Now: No





- Nursing Vital Signs


Nursing Vital Signs: 


                               Initial Vital Signs











Temperature  98.0 F   03/30/21 18:10


 


Pulse Rate  78   03/30/21 18:10


 


Respiratory Rate  18   03/30/21 18:10


 


Blood Pressure  126/95   03/30/21 18:10


 


O2 Sat by Pulse Oximetry  98   03/30/21 18:10








                                   Pain Scale











Pain Intensity                 8

















- Jaron Coma Score


Best Eye Response (Oran): (4) open spontaneously


Best Verbal Response (Jaron): (5) oriented


Best Motor Response (Jaron): (6) obeys commands


Jaron Total: 15





- Physical Exam


General Appearance: no apparent distress


Head Injury: no evidence of injury


Eye Exam: PERRL/EOMI, eyes nml inspection


ENT Exam: airway nml, No evidence of ENT injury, No clear fluid (ears), No clear

fluid (nose)


Neck Exam: supple, trachea midline, c-collar in place (C-spine nttp and removed 

after exam)


Respiratory/Chest Exam: normal breath sounds, No respiratory distress, No 

splinting


Cardiovascular Exam: normal heart sounds, regular rate/rhythm, normal peripheral

pulses, No edema


Gastrointestinal Exam: soft, normal bowel sounds, No tenderness, No distention


Back Exam: normal inspection


Extremity Exam: pelvis stable (L hip ttp wo shortening-external rotation/R 1st 

digit of hand ttp wo deformity, good radial pulse, distal sensation, and 

capillary return)


Neurologic Exam: alert, oriented x 3, cooperative, CNs II-XII nml as tested, 

normal mood/affect, sensation nml


Skin Exam: normal color, warm, dry


**SpO2 Interpretation**: normal


SpO2: 98


O2 Delivery: Room Air





- Radiology Exams


  ** Hand


X-ray Interpretation: Interpreted by me (No fx)





- CT Exams


  ** Abdomen/Pelvis


CT Interpretation: Discussed w/radiologist (R intra-trochanteric hip fx/R 9-11 

rib fx's)


Ordered Tests: 


                               Active Orders 24 hr











 Category Date Time Status


 


 ABDOMEN AND PELVIS W/0 CONTRAS [CT] Stat Exams  03/30/21 18:15 Taken


 


 HAND (MINIMUM 3 VIEWS) Stat Exams  03/30/21 19:20 Taken








Medication Summary














Discontinued Medications














Generic Name Dose Route Start Last Admin





  Trade Name Mariann  PRN Reason Stop Dose Admin


 


Fentanyl Citrate  50 mcg  03/30/21 20:10 





  Sublimaze 100 Mcg/2 Ml***  IV  03/30/21 20:11 





  STAT ONE  


 


Hydromorphone HCl  1 mg  03/30/21 20:33  03/30/21 20:35





  Hydromorphone 1 Mg/Ml Injection***  IM  03/30/21 20:34  1 mg





  STAT ONE   Administration


 


Hydromorphone HCl  Confirm  03/30/21 20:33 





  Hydromorphone 1 Mg/Ml Injection***  Administered  03/30/21 20:34 





  Dose  





  1 mg  





  .ROUTE  





  .STK-MED ONE  


 


Ondansetron HCl  4 mg  03/30/21 20:10 





  Zofran 4 Mg/2 Ml Vial**  IV  03/30/21 20:11 





  STAT ONE  


 


Ondansetron HCl  4 mg  03/30/21 20:34  03/30/21 20:36





  Zofran Odt 4 Mg***  PO  03/30/21 20:35  4 mg





  STAT ONE   Administration


 


Ondansetron HCl  Confirm  03/30/21 20:33 





  Zofran Odt 4 Mg***  Administered  03/30/21 20:34 





  Dose  





  4 mg  





  .ROUTE  





  .STK-MED ONE  














- Progress


Progress Note: 





03/30/21 20:12


Pt accepted by Dr. Alvarado


1mg IM Dilaudid/4mg Zofran odt


Pt stable when EMS assumed care of pt


03/30/21 21:21





Counseled pt/family regarding: rad results





- Departure


Departure Disposition: Transfer


Clinical Impression: 


 Hip fx, Rib fractures, Contusion of hand





Condition: Stable


Critical Care Time: No


Referrals: 


JESSA CONNER MD [Primary Care Provider] -

## 2021-03-31 NOTE — XRAY
Indication: Right hip pain following fall.



Multiple contiguous axial images obtained through the abdomen and pelvis

without contrast.



Comparison: August 23, 2020.



Lung bases again demonstrates bibasilar subsegmental atelectasis and right

middle lobe calcified granuloma.  New nondisplaced right posterior 9-11 rib

fractures with tiny hemothorax.  Heart remains enlarged.



Noncontrasted stomach and bowel loops remain nonobstructed.  Stable splenic

calcified granulomas, bilateral renal cysts, and hysterectomy.  No free

fluid/air.



Remaining liver, gallbladder, pancreas, spleen, adrenal glands, kidneys,

ureters, and bladder are unremarkable for noncontrast exam.  Stable moderate

aortoiliac calcifications without AAA.



Osseous structures demonstrates new nondisplaced right greater trochanter

fracture.  Stable T11 Schmorl node.



Impression:

1.  New nondisplaced right greater trochanter fracture and right 9-11 rib

fractures with tiny hemothorax.

2.  Stable cardiomegaly, bilateral renal cysts, and old granulomatous disease.

## 2021-03-31 NOTE — XRAY
Indication: Pain following fall.



Comparison: None



3 view right hand demonstrates osteopenia and old distal radius fracture with

tiny posterior heterotopic ossification.  No other bony, articular, or soft

tissue abnormalities.